# Patient Record
Sex: FEMALE | Race: WHITE | Employment: OTHER | URBAN - METROPOLITAN AREA
[De-identification: names, ages, dates, MRNs, and addresses within clinical notes are randomized per-mention and may not be internally consistent; named-entity substitution may affect disease eponyms.]

---

## 2020-09-11 ENCOUNTER — HOSPITAL ENCOUNTER (OUTPATIENT)
Age: 75
Discharge: HOME | End: 2020-09-13
Payer: MEDICARE

## 2020-09-11 ENCOUNTER — HOSPITAL ENCOUNTER (OUTPATIENT)
Dept: MAMMOGRAPHY | Age: 75
Discharge: HOME | End: 2020-09-13
Payer: MEDICARE

## 2020-09-11 PROCEDURE — 77063 BREAST TOMOSYNTHESIS BI: CPT

## 2021-09-29 ENCOUNTER — HOSPITAL ENCOUNTER (INPATIENT)
Age: 76
LOS: 8 days | Discharge: HOME | DRG: 186 | End: 2021-10-08
Attending: EMERGENCY MEDICINE | Admitting: INTERNAL MEDICINE
Payer: MEDICARE

## 2021-09-29 DIAGNOSIS — J90 PLEURAL EFFUSION, BILATERAL: ICD-10-CM

## 2021-09-29 DIAGNOSIS — R09.02 HYPOXIA: Primary | ICD-10-CM

## 2021-09-29 PROCEDURE — 96374 THER/PROPH/DIAG INJ IV PUSH: CPT

## 2021-09-29 PROCEDURE — 93005 ELECTROCARDIOGRAM TRACING: CPT | Performed by: EMERGENCY MEDICINE

## 2021-09-29 PROCEDURE — 96375 TX/PRO/DX INJ NEW DRUG ADDON: CPT

## 2021-09-29 PROCEDURE — 99285 EMERGENCY DEPT VISIT HI MDM: CPT

## 2021-09-29 RX ORDER — ASPIRIN 81 MG/1
81 TABLET, CHEWABLE ORAL DAILY
Status: ON HOLD | COMMUNITY
End: 2021-10-08 | Stop reason: HOSPADM

## 2021-09-29 RX ORDER — SERTRALINE HYDROCHLORIDE 25 MG/1
25 TABLET, FILM COATED ORAL EVERY EVENING
COMMUNITY
Start: 2021-03-31 | End: 2021-10-23

## 2021-09-29 RX ORDER — LOSARTAN POTASSIUM 50 MG/1
50 TABLET ORAL EVERY EVENING
COMMUNITY
End: 2021-10-23

## 2021-09-29 RX ORDER — PANTOPRAZOLE SODIUM 40 MG/1
40 TABLET, DELAYED RELEASE ORAL DAILY
COMMUNITY
Start: 2021-09-28 | End: 2021-12-27

## 2021-09-30 ENCOUNTER — APPOINTMENT (OUTPATIENT)
Dept: GENERAL RADIOLOGY | Age: 76
DRG: 186 | End: 2021-09-30
Payer: MEDICARE

## 2021-09-30 ENCOUNTER — APPOINTMENT (OUTPATIENT)
Dept: CT IMAGING | Age: 76
DRG: 186 | End: 2021-09-30
Payer: MEDICARE

## 2021-09-30 PROBLEM — R09.02 HYPOXIA: Status: ACTIVE | Noted: 2021-09-30

## 2021-09-30 PROBLEM — J90 PLEURAL EFFUSION: Status: ACTIVE | Noted: 2021-09-30

## 2021-09-30 LAB
ALBUMIN SERPL-MCNC: 3.2 G/DL (ref 3.5–5.2)
ALP BLD-CCNC: 131 U/L (ref 35–104)
ALT SERPL-CCNC: 38 U/L (ref 0–32)
ANION GAP SERPL CALCULATED.3IONS-SCNC: 11 MMOL/L (ref 7–16)
AST SERPL-CCNC: 20 U/L (ref 0–31)
B.E.: 4.6 MMOL/L (ref -3–3)
BASOPHILS ABSOLUTE: 0.03 E9/L (ref 0–0.2)
BASOPHILS RELATIVE PERCENT: 0.4 % (ref 0–2)
BILIRUB SERPL-MCNC: 0.4 MG/DL (ref 0–1.2)
BILIRUBIN URINE: NEGATIVE
BLOOD, URINE: NEGATIVE
BUN BLDV-MCNC: 25 MG/DL (ref 6–23)
CALCIUM SERPL-MCNC: 9.2 MG/DL (ref 8.6–10.2)
CHLORIDE BLD-SCNC: 95 MMOL/L (ref 98–107)
CLARITY: CLEAR
CO2: 29 MMOL/L (ref 22–29)
COHB: 0.5 % (ref 0–1.5)
COLOR: YELLOW
CREAT SERPL-MCNC: 0.7 MG/DL (ref 0.5–1)
CRITICAL: ABNORMAL
DATE ANALYZED: ABNORMAL
DATE OF COLLECTION: ABNORMAL
EKG ATRIAL RATE: 107 BPM
EKG P AXIS: 64 DEGREES
EKG P-R INTERVAL: 126 MS
EKG Q-T INTERVAL: 368 MS
EKG QRS DURATION: 70 MS
EKG QTC CALCULATION (BAZETT): 491 MS
EKG R AXIS: -29 DEGREES
EKG T AXIS: 86 DEGREES
EKG VENTRICULAR RATE: 107 BPM
EOSINOPHILS ABSOLUTE: 0.01 E9/L (ref 0.05–0.5)
EOSINOPHILS RELATIVE PERCENT: 0.1 % (ref 0–6)
GFR AFRICAN AMERICAN: >60
GFR NON-AFRICAN AMERICAN: >60 ML/MIN/1.73
GLUCOSE BLD-MCNC: 188 MG/DL (ref 74–99)
GLUCOSE URINE: NEGATIVE MG/DL
HCO3: 27.9 MMOL/L (ref 22–26)
HCT VFR BLD CALC: 33.4 % (ref 34–48)
HEMOGLOBIN: 10.5 G/DL (ref 11.5–15.5)
HHB: 2.8 % (ref 0–5)
IMMATURE GRANULOCYTES #: 0.04 E9/L
IMMATURE GRANULOCYTES %: 0.5 % (ref 0–5)
KETONES, URINE: NEGATIVE MG/DL
LAB: ABNORMAL
LACTIC ACID: 1.6 MMOL/L (ref 0.5–2.2)
LEUKOCYTE ESTERASE, URINE: NEGATIVE
LYMPHOCYTES ABSOLUTE: 0.56 E9/L (ref 1.5–4)
LYMPHOCYTES RELATIVE PERCENT: 6.9 % (ref 20–42)
Lab: ABNORMAL
MCH RBC QN AUTO: 28.1 PG (ref 26–35)
MCHC RBC AUTO-ENTMCNC: 31.4 % (ref 32–34.5)
MCV RBC AUTO: 89.3 FL (ref 80–99.9)
METHB: 0.3 % (ref 0–1.5)
MODE: ABNORMAL
MONOCYTES ABSOLUTE: 0.54 E9/L (ref 0.1–0.95)
MONOCYTES RELATIVE PERCENT: 6.6 % (ref 2–12)
NEUTROPHILS ABSOLUTE: 6.95 E9/L (ref 1.8–7.3)
NEUTROPHILS RELATIVE PERCENT: 85.5 % (ref 43–80)
NITRITE, URINE: NEGATIVE
O2 CONTENT: 14 ML/DL
O2 SATURATION: 97.2 % (ref 92–98.5)
O2HB: 96.4 % (ref 94–97)
OPERATOR ID: 3342
OVALOCYTES: ABNORMAL
PATIENT TEMP: 37 C
PCO2: 36.8 MMHG (ref 35–45)
PDW BLD-RTO: 14 FL (ref 11.5–15)
PH BLOOD GAS: 7.5 (ref 7.35–7.45)
PH UA: 7.5 (ref 5–9)
PLATELET # BLD: 578 E9/L (ref 130–450)
PMV BLD AUTO: 9.8 FL (ref 7–12)
PO2: 95.8 MMHG (ref 75–100)
POIKILOCYTES: ABNORMAL
POLYCHROMASIA: ABNORMAL
POTASSIUM REFLEX MAGNESIUM: 4 MMOL/L (ref 3.5–5)
PROTEIN UA: NEGATIVE MG/DL
RBC # BLD: 3.74 E12/L (ref 3.5–5.5)
REASON FOR REJECTION: NORMAL
REJECTED TEST: NORMAL
SARS-COV-2, NAAT: NOT DETECTED
SODIUM BLD-SCNC: 135 MMOL/L (ref 132–146)
SOURCE, BLOOD GAS: ABNORMAL
SPECIFIC GRAVITY UA: 1.01 (ref 1–1.03)
THB: 10.2 G/DL (ref 11.5–16.5)
TIME ANALYZED: 117
TOTAL PROTEIN: 6 G/DL (ref 6.4–8.3)
TROPONIN, HIGH SENSITIVITY: 24 NG/L (ref 0–9)
TROPONIN, HIGH SENSITIVITY: 28 NG/L (ref 0–9)
UROBILINOGEN, URINE: 0.2 E.U./DL
WBC # BLD: 8.1 E9/L (ref 4.5–11.5)

## 2021-09-30 PROCEDURE — 83605 ASSAY OF LACTIC ACID: CPT

## 2021-09-30 PROCEDURE — 99222 1ST HOSP IP/OBS MODERATE 55: CPT | Performed by: TRANSPLANT SURGERY

## 2021-09-30 PROCEDURE — 1200000000 HC SEMI PRIVATE

## 2021-09-30 PROCEDURE — 87635 SARS-COV-2 COVID-19 AMP PRB: CPT

## 2021-09-30 PROCEDURE — 80053 COMPREHEN METABOLIC PANEL: CPT

## 2021-09-30 PROCEDURE — 71275 CT ANGIOGRAPHY CHEST: CPT

## 2021-09-30 PROCEDURE — 6360000002 HC RX W HCPCS: Performed by: NURSE PRACTITIONER

## 2021-09-30 PROCEDURE — 6360000002 HC RX W HCPCS: Performed by: EMERGENCY MEDICINE

## 2021-09-30 PROCEDURE — 2580000003 HC RX 258: Performed by: EMERGENCY MEDICINE

## 2021-09-30 PROCEDURE — 74019 RADEX ABDOMEN 2 VIEWS: CPT

## 2021-09-30 PROCEDURE — 84484 ASSAY OF TROPONIN QUANT: CPT

## 2021-09-30 PROCEDURE — 85025 COMPLETE CBC W/AUTO DIFF WBC: CPT

## 2021-09-30 PROCEDURE — 6370000000 HC RX 637 (ALT 250 FOR IP): Performed by: PHYSICIAN ASSISTANT

## 2021-09-30 PROCEDURE — 6370000000 HC RX 637 (ALT 250 FOR IP): Performed by: EMERGENCY MEDICINE

## 2021-09-30 PROCEDURE — 2580000003 HC RX 258: Performed by: PHYSICIAN ASSISTANT

## 2021-09-30 PROCEDURE — C9113 INJ PANTOPRAZOLE SODIUM, VIA: HCPCS | Performed by: EMERGENCY MEDICINE

## 2021-09-30 PROCEDURE — 6360000004 HC RX CONTRAST MEDICATION: Performed by: RADIOLOGY

## 2021-09-30 PROCEDURE — 81003 URINALYSIS AUTO W/O SCOPE: CPT

## 2021-09-30 PROCEDURE — 94664 DEMO&/EVAL PT USE INHALER: CPT

## 2021-09-30 PROCEDURE — 36415 COLL VENOUS BLD VENIPUNCTURE: CPT

## 2021-09-30 PROCEDURE — 2700000000 HC OXYGEN THERAPY PER DAY

## 2021-09-30 PROCEDURE — 94640 AIRWAY INHALATION TREATMENT: CPT

## 2021-09-30 PROCEDURE — 93010 ELECTROCARDIOGRAM REPORT: CPT | Performed by: INTERNAL MEDICINE

## 2021-09-30 PROCEDURE — 82805 BLOOD GASES W/O2 SATURATION: CPT

## 2021-09-30 PROCEDURE — 71045 X-RAY EXAM CHEST 1 VIEW: CPT

## 2021-09-30 RX ORDER — POTASSIUM CHLORIDE 7.45 MG/ML
10 INJECTION INTRAVENOUS PRN
Status: DISCONTINUED | OUTPATIENT
Start: 2021-09-30 | End: 2021-10-08 | Stop reason: HOSPADM

## 2021-09-30 RX ORDER — MULTIVIT,CALC,MINS/FOLIC ACID 200 MCG
1 TABLET ORAL DAILY
COMMUNITY
End: 2021-10-23

## 2021-09-30 RX ORDER — SODIUM CHLORIDE 9 MG/ML
10 INJECTION INTRAVENOUS DAILY
Status: DISCONTINUED | OUTPATIENT
Start: 2021-09-30 | End: 2021-09-30

## 2021-09-30 RX ORDER — PANTOPRAZOLE SODIUM 40 MG/10ML
40 INJECTION, POWDER, LYOPHILIZED, FOR SOLUTION INTRAVENOUS ONCE
Status: COMPLETED | OUTPATIENT
Start: 2021-09-30 | End: 2021-09-30

## 2021-09-30 RX ORDER — FUROSEMIDE 10 MG/ML
20 INJECTION INTRAMUSCULAR; INTRAVENOUS 2 TIMES DAILY
Status: DISCONTINUED | OUTPATIENT
Start: 2021-09-30 | End: 2021-10-01

## 2021-09-30 RX ORDER — ASPIRIN 81 MG/1
81 TABLET, CHEWABLE ORAL DAILY
Status: DISCONTINUED | OUTPATIENT
Start: 2021-09-30 | End: 2021-10-01

## 2021-09-30 RX ORDER — ACETAMINOPHEN 325 MG/1
650 TABLET ORAL EVERY 6 HOURS PRN
Status: DISCONTINUED | OUTPATIENT
Start: 2021-09-30 | End: 2021-10-08 | Stop reason: HOSPADM

## 2021-09-30 RX ORDER — SODIUM CHLORIDE 0.9 % (FLUSH) 0.9 %
10 SYRINGE (ML) INJECTION EVERY 12 HOURS SCHEDULED
Status: DISCONTINUED | OUTPATIENT
Start: 2021-09-30 | End: 2021-10-08 | Stop reason: HOSPADM

## 2021-09-30 RX ORDER — LOSARTAN POTASSIUM 50 MG/1
50 TABLET ORAL DAILY
Status: DISCONTINUED | OUTPATIENT
Start: 2021-09-30 | End: 2021-10-08 | Stop reason: HOSPADM

## 2021-09-30 RX ORDER — IPRATROPIUM BROMIDE AND ALBUTEROL SULFATE 2.5; .5 MG/3ML; MG/3ML
1 SOLUTION RESPIRATORY (INHALATION)
Status: DISCONTINUED | OUTPATIENT
Start: 2021-09-30 | End: 2021-10-07

## 2021-09-30 RX ORDER — PANTOPRAZOLE SODIUM 40 MG/1
40 TABLET, DELAYED RELEASE ORAL DAILY
Status: DISCONTINUED | OUTPATIENT
Start: 2021-09-30 | End: 2021-10-01

## 2021-09-30 RX ORDER — 0.9 % SODIUM CHLORIDE 0.9 %
1000 INTRAVENOUS SOLUTION INTRAVENOUS ONCE
Status: COMPLETED | OUTPATIENT
Start: 2021-09-30 | End: 2021-09-30

## 2021-09-30 RX ORDER — PANTOPRAZOLE SODIUM 40 MG/10ML
40 INJECTION, POWDER, LYOPHILIZED, FOR SOLUTION INTRAVENOUS DAILY
Status: DISCONTINUED | OUTPATIENT
Start: 2021-09-30 | End: 2021-09-30

## 2021-09-30 RX ORDER — SODIUM CHLORIDE 9 MG/ML
25 INJECTION, SOLUTION INTRAVENOUS PRN
Status: DISCONTINUED | OUTPATIENT
Start: 2021-09-30 | End: 2021-10-08 | Stop reason: HOSPADM

## 2021-09-30 RX ORDER — SODIUM CHLORIDE 0.9 % (FLUSH) 0.9 %
10 SYRINGE (ML) INJECTION PRN
Status: DISCONTINUED | OUTPATIENT
Start: 2021-09-30 | End: 2021-10-08 | Stop reason: HOSPADM

## 2021-09-30 RX ORDER — POTASSIUM CHLORIDE 20 MEQ/1
40 TABLET, EXTENDED RELEASE ORAL PRN
Status: DISCONTINUED | OUTPATIENT
Start: 2021-09-30 | End: 2021-10-08 | Stop reason: HOSPADM

## 2021-09-30 RX ORDER — ONDANSETRON 2 MG/ML
4 INJECTION INTRAMUSCULAR; INTRAVENOUS ONCE
Status: COMPLETED | OUTPATIENT
Start: 2021-09-30 | End: 2021-09-30

## 2021-09-30 RX ORDER — SODIUM CHLORIDE 9 MG/ML
10 INJECTION INTRAVENOUS DAILY
Status: DISCONTINUED | OUTPATIENT
Start: 2021-09-30 | End: 2021-10-08 | Stop reason: HOSPADM

## 2021-09-30 RX ORDER — ACETAMINOPHEN 650 MG/1
650 SUPPOSITORY RECTAL EVERY 6 HOURS PRN
Status: DISCONTINUED | OUTPATIENT
Start: 2021-09-30 | End: 2021-10-08 | Stop reason: HOSPADM

## 2021-09-30 RX ADMIN — IPRATROPIUM BROMIDE AND ALBUTEROL SULFATE 1 AMPULE: 2.5; .5 SOLUTION RESPIRATORY (INHALATION) at 19:52

## 2021-09-30 RX ADMIN — SODIUM CHLORIDE, PRESERVATIVE FREE 10 ML: 5 INJECTION INTRAVENOUS at 13:57

## 2021-09-30 RX ADMIN — FUROSEMIDE 20 MG: 10 INJECTION, SOLUTION INTRAVENOUS at 18:05

## 2021-09-30 RX ADMIN — PANTOPRAZOLE SODIUM 40 MG: 40 TABLET, DELAYED RELEASE ORAL at 13:57

## 2021-09-30 RX ADMIN — ONDANSETRON 4 MG: 2 INJECTION INTRAMUSCULAR; INTRAVENOUS at 00:51

## 2021-09-30 RX ADMIN — Medication 10 ML: at 20:54

## 2021-09-30 RX ADMIN — Medication 10 ML: at 14:57

## 2021-09-30 RX ADMIN — LIDOCAINE HYDROCHLORIDE: 20 SOLUTION ORAL; TOPICAL at 06:07

## 2021-09-30 RX ADMIN — IOPAMIDOL 75 ML: 755 INJECTION, SOLUTION INTRAVENOUS at 02:55

## 2021-09-30 RX ADMIN — ASPIRIN 81 MG CHEWABLE TABLET 81 MG: 81 TABLET CHEWABLE at 13:57

## 2021-09-30 RX ADMIN — IPRATROPIUM BROMIDE AND ALBUTEROL SULFATE 1 AMPULE: 2.5; .5 SOLUTION RESPIRATORY (INHALATION) at 11:08

## 2021-09-30 RX ADMIN — PANTOPRAZOLE SODIUM 40 MG: 40 INJECTION, POWDER, FOR SOLUTION INTRAVENOUS at 02:13

## 2021-09-30 RX ADMIN — SERTRALINE 25 MG: 50 TABLET, FILM COATED ORAL at 20:54

## 2021-09-30 RX ADMIN — SODIUM CHLORIDE 1000 ML: 9 INJECTION, SOLUTION INTRAVENOUS at 00:50

## 2021-09-30 ASSESSMENT — ENCOUNTER SYMPTOMS
BACK PAIN: 0
ABDOMINAL PAIN: 0
NAUSEA: 1
ALLERGIC/IMMUNOLOGIC NEGATIVE: 1
CONSTIPATION: 1
BLOOD IN STOOL: 0
ABDOMINAL DISTENTION: 1
TROUBLE SWALLOWING: 0
VOMITING: 1
SHORTNESS OF BREATH: 1
COUGH: 1

## 2021-09-30 NOTE — ED NOTES
Daughter at bedside put on call light concerned for BP that is dropping. Daughter states that she is normally in the 140's and had an issue with being hypertensive in prior admission at [facility].  MD made aware of concern     Mau Nieto RN  09/30/21 3774

## 2021-09-30 NOTE — CONSULTS
infiltrate c/f aspiration pneumonia. NGT placed. Vancomycin added to abx regimen. Corpak removed as it was coiled in stomach. NG had been clamped on and off for several days. She was eventually advanced to a full liquid diet on 9/25/2021 and tolerated pretty well. Oxygen had been weaned off prior to discharge. On the day of discharge, patient was tolerating a GI soft diet, urinating independently, ambulating appropriately, having ROBF and pain controlled on PO pain medication. She was discharged from [facility] on 9/28/2021. Over the past few days since leaving the hospital she became increasingly nauseated started having large-volume emesis and worsening shortness of breath. She presented back to the ED on 9/29/2021 for further evaluation. Her daughters are also present at bedside during exam.    Upon arrival patient was placed on oxygen 6 L nasal cannula. Oxygen level 95 to 96% at rest.  Denies fevers chills. Admits to recent nausea and emesis. Recently completed full course of Zosyn for aspiration pneumonia at [facility]. Chest x-ray with no acute process. KUB abdomen nonspecific nonobstructive bowel gas pattern. CTA chest with no evidence of PE. No mediastinal lymphadenopathy. The lungs and pleura small left pleural effusion, tiny right pleural effusion, moderate left lower lobe atelectasis, minimal right lower lobe dependent atelectasis. Ill-defined irregular opacity in the right lung apex measuring 1.1 x 1.6 x 0.6 cm. Scattered calcifications nonspecific suggestive of old granulomatous disease. 3 mm subpleural left upper lobe nodule laterally. Lab testing-high-sensitivity troponin 28> 24, sodium 135, potassium 4.0, BUN 25 creatinine 0.7, hemoglobin 10.5, WBCs 8.1, alk phos 131 ALT 38 AST 20 T bili 0.4, lactic acid 1.6, ABG on 6 L 7.49/36/95/27/4.6/97% EKG with sinus tachycardia heart rate 107. Patient lying on a cart ED no acute distress. Oxygen 6 L nasal cannula. No cough or mucus.   No fever or chills. No chest pain. Dyspnea improved with placement of oxygen. Past Medical History:   Diagnosis Date    Depression     GERD (gastroesophageal reflux disease)     Hypertension        History reviewed. No pertinent surgical history. History reviewed. No pertinent family history. Social History:   Social History     Socioeconomic History    Marital status:      Spouse name: Not on file    Number of children: Not on file    Years of education: Not on file    Highest education level: Not on file   Occupational History    Not on file   Tobacco Use    Smoking status: Never Smoker    Smokeless tobacco: Never Used   Vaping Use    Vaping Use: Never used   Substance and Sexual Activity    Alcohol use: Not Currently    Drug use: Never    Sexual activity: Not on file   Other Topics Concern    Not on file   Social History Narrative    Not on file     Social Determinants of Health     Financial Resource Strain:     Difficulty of Paying Living Expenses:    Food Insecurity:     Worried About Running Out of Food in the Last Year:     [address] in the Last Year:    Transportation Needs:     Lack of Transportation (Medical):  Lack of Transportation (Non-Medical):    Physical Activity:     Days of Exercise per Week:     Minutes of Exercise per Session:    Stress:     Feeling of Stress :    Social Connections:     Frequency of Communication with Friends and Family:     Frequency of Social Gatherings with Friends and Family:     Attends Anglican Services:     Active Member of Clubs or Organizations:     Attends Club or Organization Meetings:     Marital Status:    Intimate Partner Violence:     Fear of Current or Ex-Partner:     Emotionally Abused:     Physically Abused:     Sexually Abused:      Smoking history: The patient is a former smoker quit over 20 years ago half pack per day x20 years. ETOH:   reports previous alcohol use. Exposures:  There  is not history of TB or TB exposure. There is not asbestos or silica dust exposure. The patient reports does not have coal, foundry, quarry or [employer] exposure. Recent travel history none. There is not  history of recreational or IV drug use. There is not hot tub exposure. The patient does not have any exotic pets, turtles or exotic birds. Vaccines:      Immunization History   Administered Date(s) Administered    COVID-19, Pfizer, PF, 30mcg/0.3mL 02/01/2021, 02/26/2021        Home Meds: Not in a hospital admission. CURRENT MEDS :  Scheduled Meds:   sodium chloride (PF)  10 mL IntraVENous Daily    aspirin  81 mg Oral Daily    losartan  50 mg Oral Daily    pantoprazole  40 mg Oral Daily    sertraline  25 mg Oral Daily    sodium chloride flush  10 mL IntraVENous 2 times per day    ipratropium-albuterol  1 ampule Inhalation Q4H WA       Continuous Infusions:   sodium chloride         Allergies   Allergen Reactions    Chocolate Other (See Comments)     HEARTBURN       REVIEW OF SYSTEMS:  Constitutional: Denies fever, weight loss, night sweats, and fatigue  Skin: Denies pigmentation, dark lesions, and rashes   HEENT: Denies hearing loss, tinnitus, ear drainage, epistaxis, sore throat, and hoarseness. Cardiovascular: Denies palpitations, chest pain, and chest pressure.   Respiratory: Dyspnea  Gastrointestinal: Nausea and vomiting  Genitourinary: Denies dysuria, frequency, urgency or hematuria  Musculoskeletal: Denies myalgias, muscle weakness, and bone pain  Neurological: Denies dizziness, vertigo, headache, and focal weakness  Psychological: Denies anxiety and depression  Endocrine: Denies heat intolerance and cold intolerance  Hematopoietic/Lymphatic: Denies bleeding problems and blood transfusions    OBJECTIVE:   /63   Pulse 91   Temp 98.4 °F (36.9 °C) (Oral)   Resp 16   Ht 5' 6\" (1.676 m)   Wt 185 lb (83.9 kg)   SpO2 96%   BMI 29.86 kg/m²   SpO2 Readings from Last 1 Encounters:   09/30/21 96% right atrial pressure is 8   mmHg based on IVC assessment. - There are no significant valvular abnormalities. - The patient has not had a prior CC echocardiographic exam for comparison. [facility] CT chest without contrast 9/16/2021  1.  Bilateral low-density pleural effusions, left greater than right.     Dependent consolidative airspace opacities associated with volume loss in   both lower lobes, most likely secondary to nonspecific atelectasis. 2. Heterogeneous airspace/groundglass opacity in the right upper lobe,   likely secondary to focal pneumonia.  Additional peribronchiolar   groundglass and airspace opacities which are likely infectious in nature   are identified in the right lung. 3. Multiple vague, tiny centrilobular nodular densities are scattered in   both upper lobes, most commonly secondary to nonspecific inflammatory   bronchiolitis, possibly infectious in nature. 4. No region of intrathoracic lymphadenopathy is identified. Ultrasound legs 9/16/2021       Non-Invasive Vascular Laboratory   [facility] Port[facility]       Lower Extremity Venous Duplex   Bilateral/Complete     Date of service/time: 9/16/2021 8:59:06 AM   MRN: [id number]   Accession #: [id number]   ----------   Technically difficult exam due to patient positioning and edema. RIGHT SIDE - DEEP VEINS   Negative for acute deep vein thrombosis. Only segments visualized of the peroneal veins. LEFT SIDE - DEEP VEINS   Negative for acute deep vein thrombosis. Only segments visualized of the peroneal veins.           Labs:  Lab Results   Component Value Date    WBC 8.1 09/30/2021    HGB 10.5 09/30/2021    HCT 33.4 09/30/2021    MCV 89.3 09/30/2021    MCH 28.1 09/30/2021    MCHC 31.4 09/30/2021    RDW 14.0 09/30/2021     09/30/2021    MPV 9.8 09/30/2021     Lab Results   Component Value Date     09/30/2021    K 4.0 09/30/2021    CL 95 09/30/2021    CO2 29 09/30/2021    BUN 25 09/30/2021    CREATININE 0.7 09/30/2021    LABALBU 3.2 09/30/2021    CALCIUM 9.2 09/30/2021    GFRAA >60 09/30/2021    LABGLOM >60 09/30/2021     No results found for: PROTIME, INR  No results for input(s): PROBNP in the last 72 hours. No results for input(s): TROPONINI in the last 72 hours. No results for input(s): PROCAL in the last 72 hours. This SmartLink has not been configured with any valid records. Micro:  No results for input(s): CULTRESP in the last 72 hours. No results for input(s): LABGRAM in the last 72 hours. No results for input(s): LEGUR in the last 72 hours. No results for input(s): STREPNEUMAGU in the last 72 hours. No results for input(s): LP1UAG in the last 72 hours. Surgical pathology 9/9/2021  Specimen originated from [facility] [place][note 2]     Specimen #: [id number]   Submitting Physician: SALLY Potts.Anes)       __________________________________________________________________________     FINAL DIAGNOSIS     1. Gallbladder, cholecystectomy (A) - Gallbladder with focal chronic   inflammation. 2. Pancreas, main duct, margin, excision (B) - Cross section of pancreas   with pancreatic intraepithelial neoplasia (PAN-IN1). 3. Duodenum, common bile duct and head of pancreas, Pylorus-Preserving   Pancreaticoduodenectomy(C)   - Intraductal papillary mucinous neoplasm, side branch. - Twelve regional lymph nodes with no significant diagnostic alterations. 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        Specimen Request  Specimen collected in surgery. Specimen received in sterile container. Smear Result  No organisms seen       Smear Result  No Polymorphonuclear Leukocytes       Culture  No growth 3 days          Assessment:  1. Acute hypoxic respiratory failure  2. Small pleural effusion with compressive atelectasis left lower lobe  3. Small area of scar and right lung apex  4.  Recent aspiration pneumonia-completed antibiotics Zosyn  5. 9/9/2021 Whipple procedure at [facility] Dr. Susan Ghosh  6. Pancreatic cyst pathology negative for malignant cells  7. Nausea and vomiting likely related to delayed gastric emptying-recent NG tube placement post Whipple procedure  8. GERD  9. Hypertension  10. 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 Constipation    Plan:  1. Oxygen therapy 6 L nasal cannula-wean to keep greater than 92%  2. CT chest imaging reviewed by Dr. Elizabeth Morrison. Left effusion appears very small with compressive atelectasis of lung tissue likely contributing to patient's hypoxia and shortness of breath. 3. We will add lung recruitment maneuvers-incentive spirometer, EZ Pap with scheduled bronchodilators every 4 hours  4. Follow chest x-ray in a.m.-orders placed. If no improvement will perform ultrasound of chest to assess degree of pleural fluid. 5. Will consult Dr. Usama Ruff for further recommendations regarding nausea and vomiting, status post Whipple procedure 9/9  6. Full liquid diet  7. Replace electrolytes. Antiemetics. 8. GI prophylaxis and Protonix. Consider adding bowel regimen, prokinetic    Thank you for allowing me to participate in the care of Nitesh Lines. Please feel free to call with questions. This plan of care was reviewed in collaboration with Dr. Elizabeth Morrison    Electronically signed by NAMRATA Javier CNP on 9/30/2021 at 3:10 PM      Note: This report was completed utilizing computer voice recognition software. Every effort has been made to ensure accuracy, however; inadvertent computerized transcription errors may be present    I personally saw, examined, and cared for the patient. Labs, medications, radiographs reviewed. I agree with history exam and plans detailed in NP note with the following additions:    Ms. Dereje Morgan is a 67year old female with recent Whipple procedure 3 weeks ago complicated by pneumonia and delayed gastric emptying.   She presented to the ED with increased nausea, vomiting, shortness of breath. CT chest with a small L effusion and atelectasis. She has no pneumonia. I feel this is likely too small to tap but will perform a bedside US. In the meantime start incentive and EZPAP. Hepatobiliary has been consulted for her vomiting and delayed gastric emptying.     Electronically signed by Kuldeep Cruz MD on 9/30/2021 at 11:53 PM

## 2021-09-30 NOTE — PROGRESS NOTES
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 Had gastric pneumatosis and was started on TPN then transitioned to tube feedings. Tube feeds were stopped and patient was discharged on 9/28 tolerating a full liquid diet. She still has delayed gastric emptying on imaging and based on symptoms. Recommend transfer back to [facility] to Dr. Chucho Beavers service her operating surgeon to address her post operative issues and determine if a feeding tube needs replaced. She is only 3 weeks post surgery.     Electronically signed by Leigh Ann Soto MD on 9/30/2021 at 3:14 PM

## 2021-09-30 NOTE — ED NOTES
Assume care of pt at this time, VSS and pt free of any immediate distress. Daughter at bedside.  Call light in reach will continue to monitor      Saira Alonzo RN  09/30/21 5925

## 2021-09-30 NOTE — CONSULTS
Hepatobiliary and Pancreatic Surgery    History and Physical      Patient's Name/Date of Birth: Juan Ramon Morton /1945 (68 y.o.)    Date: September 30, 2021     CC:   Chief Complaint   Patient presents with    Shortness of Breath    Emesis       HPI:  Juan Ramon Morton is a 67year old female presenting with shortness of breath and emesis who recently underwent a Whipple procedure at the [facility] on 9/9 for branch duct IPMN. Surgical course at [facility] was as follows:    9/10: Started on trickle tube feeds and clear liquids. RAMSES drains maintained to bulb suction. Pain controlled with a dilaudid PCA along with PO tylenol. Lovenox ordered for DVT PPX. PT skilled for Home PT. Case management consulted for dispo planning.   9/11: Urinary retention overnight requiring straight catheterization. Continued on trickle tube feeds and clears. 9/12: Tube feeds discontinued. Remained on full liquids. L RAMSES bumped at bedside. 9/13-9/14: Passing small amount of flatus. Diet advanced to soft solids. PCA discontinued. 9/15: Continued respiratory insufficiency requiring supplemental O2. Continued on pulmonary toilet. Given 20mg IV lasix. RT consulted. Did not have appropriate UOP after diuresis. Became hypotensive and hypoxic in the evening with marginal responses to IVF boluses. Having AMS. Transferred to SICU for further management. Started on empiric zosyn. 9/16: CT abdomen/pelvis demonstrating pneumatosis in the posterior stomach. CT chest demonstrating BL pleural effusions along with RUL focal infiltrate c/f aspiration pneumonia. NGT placed. Vancomycin added to abx regimen. Corpak removed as it was coiled in stomach. 9/17: NST consulted for initiation of TPN. PT skilled for SNF.  9/18: Continued SICU care. Insulin increased in TPN.   9/19: Continued diuresis with IV lasix. Home losartan resumed. No BM yet. NGT output decreasing   9/20: Transferred to the [facility]  9/21: Began having BM.  NGT maintained to LCS. Continued on TPN. Geriatrics consulted for delirium   9/22: Tolerating serial NGT clamp trials. Finished course of IV abx.   9/23: Fiorcet added for worsening HA. Ambulating well with PT. Skilled for SNF  9/24: Continued on serial NGT clamp trials. NGT removed in the evening  9/25: Given full liquid diet as tolerated. 9/26: Tolerating full liquids. Continues to have BM  9/27: Diet advanced to soft solids. PT skilled for Home PT. Case management reconsulted for dispo planning. TPN discontinued. She reports that her nausea/emesis coincided with the removal of her NG tube and start of regular diet. She reports that she has only had 3 bowel movements since her surgery with her last being Monday. CTA pulmonary revealed bilateral pleural effusions. She is on 6 L of O2. She is currently resting in her bed comfortably, in no pain, and tolerating a full liquid diet with no nausea or vomiting. Past Medical History:   Diagnosis Date    Depression     GERD (gastroesophageal reflux disease)     Hypertension        History reviewed. No pertinent surgical history.     Current Facility-Administered Medications   Medication Dose Route Frequency Provider Last Rate Last Admin    sodium chloride (PF) 0.9 % injection 10 mL  10 mL IntraVENous Daily [place] Motto, DO   10 mL at 09/30/21 1357    aspirin chewable tablet 81 mg  81 mg Oral Daily GUERRERO Fortune   81 mg at 09/30/21 1357    losartan (COZAAR) tablet 50 mg  50 mg Oral Daily Beatrice Conte, [address]        pantoprazole (PROTONIX) tablet 40 mg  40 mg Oral Daily GUERRERO Fortune   40 mg at 09/30/21 1357    sertraline (ZOLOFT) tablet 25 mg  25 mg Oral Daily Beatrice Conte, [address]        sodium chloride flush 0.9 % injection 10 mL  10 mL IntraVENous 2 times per day GUERRERO Fortune   10 mL at 09/30/21 1457    sodium chloride flush 0.9 % injection 10 mL  10 mL IntraVENous PRN GUERRERO Fortune        0.9 % sodium chloride infusion  25 mL IntraVENous PRN GUERRERO Abel        potassium chloride (KLOR-CON M) extended release tablet 40 mEq  40 mEq Oral PRN GUERRERO Abel        Or    potassium bicarb-citric acid (EFFER-K) effervescent tablet 40 mEq  40 mEq Oral PRN GUERRERO Abel        Or    potassium chloride 10 mEq/100 mL IVPB (Peripheral Line)  10 mEq IntraVENous PRN GUERRERO Abel        magnesium hydroxide (MILK OF MAGNESIA) 400 MG/5ML suspension 30 mL  30 mL Oral Daily PRN GUERRERO Abel        acetaminophen (TYLENOL) tablet 650 mg  650 mg Oral Q6H PRN GUERRERO Abel        Or    acetaminophen (TYLENOL) suppository 650 mg  650 mg Rectal Q6H PRN GUERRERO Abel        trimethobenzamide (TIGAN) injection 200 mg  200 mg IntraMUSCular Q6H PRN GUERRERO Abel        ipratropium-albuterol (DUONEB) nebulizer solution 1 ampule  1 ampule Inhalation Q4H WA Colby Abel   1 ampule at 09/30/21 1108       Allergies   Allergen Reactions    Chocolate Other (See Comments)     HEARTBURN       History reviewed. No pertinent family history. Social History     Socioeconomic History    Marital status:      Spouse name: Not on file    Number of children: Not on file    Years of education: Not on file    Highest education level: Not on file   Occupational History    Not on file   Tobacco Use    Smoking status: Never Smoker    Smokeless tobacco: Never Used   Vaping Use    Vaping Use: Never used   Substance and Sexual Activity    Alcohol use: Not Currently    Drug use: Never    Sexual activity: Not on file   Other Topics Concern    Not on file   Social History Narrative    Not on file     Social Determinants of Health     Financial Resource Strain:     Difficulty of Paying Living Expenses:    Food Insecurity:     Worried About Running Out of Food in the Last Year:     [address] in the Last Year:    Transportation Needs:     Lack of Transportation (Medical):      Lack of Transportation (Non-Medical): Physical Activity:     Days of Exercise per Week:     Minutes of Exercise per Session:    Stress:     Feeling of Stress :    Social Connections:     Frequency of Communication with Friends and Family:     Frequency of Social Gatherings with Friends and Family:     Attends Gnosticism Services:     Active Member of Clubs or Organizations:     Attends Club or Organization Meetings:     Marital Status:    Intimate Partner Violence:     Fear of Current or Ex-Partner:     Emotionally Abused:     Physically Abused:     Sexually Abused:        ROS:   Review of Systems   Constitutional: Positive for appetite change. Negative for chills and fever. HENT: Negative for congestion and trouble swallowing. Eyes: Negative for visual disturbance. Respiratory: Positive for cough and shortness of breath. Cardiovascular: Positive for chest pain. Gastrointestinal: Positive for abdominal distention, constipation, nausea and vomiting. Negative for abdominal pain and blood in stool. Genitourinary: Negative for difficulty urinating. Musculoskeletal: Negative for arthralgias and back pain. Skin: Negative for rash. Allergic/Immunologic: Negative. Neurological: Negative for headaches. Hematological: Does not bruise/bleed easily. Psychiatric/Behavioral: Negative. Physical Exam:  /62   Pulse 84   Temp 98.4 °F (36.9 °C) (Oral)   Resp 16   Ht 5' 6\" (1.676 m)   Wt 180 lb (81.6 kg)   SpO2 98%   BMI 29.05 kg/m²     PSYCH: mood and affect normal, alert and oriented x 3: No apparent distress, comfortable  EYES: Sclera white, pupils equal round and reactive to light  ENMT:  Hearing normal, trachea midline, ears externally intact  RESP: Increased work of breathing on 6 L of O2  CV: Regular rate, normotensive. GI/ Abdomen: The abdomen was soft and mildly distended. Incisions C/D/I. There was no tenderness, guarding, rebound, or rigidity. There was no, hepatosplenomegaly, or hernias.   MSK: no clubbing/ no cyanosis       Assessment/Plan:   This is a 67year old female s/p Wh[facility] procedure 9[facility]9/21 [facility] for IPMN with now delayed gastric emptying grade C, malnutrition  - NPO with sips and ice chips, etc  - NGT to LIS  - Xray for NGT placement  - Will need upper GI with SBFT on Monday after 48 hours of decompression  - start TPN  - protonix bid  - feels better with thoracocentesis, however most of her symptoms are secondary to delayed gastric emptying      Electronically signed by Carolina Wilder MD on 10/1/2021 at 4:47 PM

## 2021-09-30 NOTE — ED PROVIDER NOTES
HPI:  9/30/21,   Time: 7:19 AM EDT        Leonor Liang is a 67 y.o. female presenting to the ED for nausea vomiting shortness of breath. Patient states she recently had a Whipple procedure done at [facility] which was complicated by pneumonia. Patient was discharged a couple days ago. Patient states shortly after discharge she began having nausea and vomiting. Patient states yesterday she began having shortness of breath. Denies fevers or chills, congestion sore throat, abdominal pain, chest pain, or any other complaints. ROS:   GEN: no fevers, no chills, no fatique  HENT: No trauma, no sore throat, no swelling throat or oral mucosa  EYES: No changes in vision, no pain  CARDIO: No chest pain, no palpitations  PULM: See HPI  ABD: See HPI  MSK: No pain, no trauma, no deformities  SKIN: No rashes, no abrasions, no lacerations  NEURO: No changes in mentation, no headache, no weakness     --------------------------------------------- PAST HISTORY ---------------------------------------------  Past Medical History:  has a past medical history of Depression. Past Surgical History:  has no past surgical history on file. Social History:  reports that she has never smoked. She has never used smokeless tobacco. She reports previous alcohol use. She reports that she does not use drugs. Family History: family history is not on file. The patients home medications have been reviewed. Allergies: Patient has no known allergies.     -------------------------------------------------- RESULTS -------------------------------------------------  All laboratory and radiology results have been personally reviewed by myself   LABS:  Results for orders placed or performed during the hospital encounter of 09/29/21   COVID-19, Rapid    Specimen: Nasopharyngeal Swab   Result Value Ref Range    SARS-CoV-2, NAAT Not Detected Not Detected   CBC Auto Differential   Result Value Ref Range    WBC 8.1 4.5 - 11.5 E9/L RBC 3.74 3.50 - 5.50 E12/L    Hemoglobin 10.5 (L) 11.5 - 15.5 g/dL    Hematocrit 33.4 (L) 34.0 - 48.0 %    MCV 89.3 80.0 - 99.9 fL    MCH 28.1 26.0 - 35.0 pg    MCHC 31.4 (L) 32.0 - 34.5 %    RDW 14.0 11.5 - 15.0 fL    Platelets 719 (H) 454 - 450 E9/L    MPV 9.8 7.0 - 12.0 fL    Neutrophils % 85.5 (H) 43.0 - 80.0 %    Immature Granulocytes % 0.5 0.0 - 5.0 %    Lymphocytes % 6.9 (L) 20.0 - 42.0 %    Monocytes % 6.6 2.0 - 12.0 %    Eosinophils % 0.1 0.0 - 6.0 %    Basophils % 0.4 0.0 - 2.0 %    Neutrophils Absolute 6.95 1.80 - 7.30 E9/L    Immature Granulocytes # 0.04 E9/L    Lymphocytes Absolute 0.56 (L) 1.50 - 4.00 E9/L    Monocytes Absolute 0.54 0.10 - 0.95 E9/L    Eosinophils Absolute 0.01 (L) 0.05 - 0.50 E9/L    Basophils Absolute 0.03 0.00 - 0.20 E9/L    Polychromasia 1+     Poikilocytes 1+     Ovalocytes 1+    Comprehensive Metabolic Panel w/ Reflex to MG   Result Value Ref Range    Sodium 135 132 - 146 mmol/L    Potassium reflex Magnesium 4.0 3.5 - 5.0 mmol/L    Chloride 95 (L) 98 - 107 mmol/L    CO2 29 22 - 29 mmol/L    Anion Gap 11 7 - 16 mmol/L    Glucose 188 (H) 74 - 99 mg/dL    BUN 25 (H) 6 - 23 mg/dL    CREATININE 0.7 0.5 - 1.0 mg/dL    GFR Non-African American >60 >=60 mL/min/1.73    GFR African American >60     Calcium 9.2 8.6 - 10.2 mg/dL    Total Protein 6.0 (L) 6.4 - 8.3 g/dL    Albumin 3.2 (L) 3.5 - 5.2 g/dL    Total Bilirubin 0.4 0.0 - 1.2 mg/dL    Alkaline Phosphatase 131 (H) 35 - 104 U/L    ALT 38 (H) 0 - 32 U/L    AST 20 0 - 31 U/L   Troponin   Result Value Ref Range    Troponin, High Sensitivity 28 (H) 0 - 9 ng/L   Urinalysis, reflex to microscopic   Result Value Ref Range    Color, UA Yellow Straw/Yellow    Clarity, UA Clear Clear    Glucose, Ur Negative Negative mg/dL    Bilirubin Urine Negative Negative    Ketones, Urine Negative Negative mg/dL    Specific Gravity, UA 1.015 1.005 - 1.030    Blood, Urine Negative Negative    pH, UA 7.5 5.0 - 9.0    Protein, UA Negative Negative mg/dL Urobilinogen, Urine 0.2 <2.0 E.U./dL    Nitrite, Urine Negative Negative    Leukocyte Esterase, Urine Negative Negative   Lactic Acid, Plasma   Result Value Ref Range    Lactic Acid 1.6 0.5 - 2.2 mmol/L   Blood Gas, Arterial   Result Value Ref Range    Date Analyzed [id number]     Time Analyzed 0117     Source: Blood Arterial     pH, Blood Gas 7.498 (H) 7.350 - 7.450    PCO2 36.8 35.0 - 45.0 mmHg    PO2 95.8 75.0 - 100.0 mmHg    HCO3 27.9 (H) 22.0 - 26.0 mmol/L    B.E. 4.6 (H) -3.0 - 3.0 mmol/L    O2 Sat 97.2 92.0 - 98.5 %    O2Hb 96.4 94.0 - 97.0 %    COHb 0.5 0.0 - 1.5 %    MetHb 0.3 0.0 - 1.5 %    O2 Content 14.0 mL/dL    HHb 2.8 0.0 - 5.0 %    tHb (est) 10.2 (L) 11.5 - 16.5 g/dL    Mode NC- 6L     Date Of Collection      Time Collected      Pt Temp 37.0 C     ID [id number]     Lab [id number]     Critical(s) Notified . No Critical Values    SPECIMEN REJECTION   Result Value Ref Range    Rejected Test trp5     Reason for Rejection see below    Troponin   Result Value Ref Range    Troponin, High Sensitivity 24 (H) 0 - 9 ng/L       RADIOLOGY:  Interpreted by Radiologist.  CTA PULMONARY W CONTRAST   Final Result   No identified pulmonary embolism. Left greater than right pleural effusions with left greater than right lower   lobe atelectasis. Irregular opacity in the right lung apex may represent an area of scarring or   fibrosis. Neoplastic process is difficult to exclude. If no previous study   is available for comparison, further characterization with PET scan would be   recommended. Alternatively, follow-up CT in 3 months if PET scan cannot be   obtained. Tiny areas of subpleural nodularity bilaterally are favored to be incidental   but could also be followed on subsequent exams.          XR ABDOMEN (2 VIEWS)    (Results Pending)       ------------------------- NURSING NOTES AND VITALS REVIEWED ---------------------------   The nursing notes within the ED encounter and vital signs as below have been reviewed. BP (!) 96/46   Pulse 96   Temp 98.6 °F (37 °C) (Oral)   Resp 16   Ht 5' 6\" (1.676 m)   Wt 185 lb (83.9 kg)   SpO2 94%   BMI 29.86 kg/m²   Oxygen Saturation Interpretation: Normal    ---------------------------------------------------PHYSICAL EXAM--------------------------------------    GEN: + Moderate distress, ill-appearing, well nourished   HENT: Normocephalic, atraumatic, oral mucosa moist  EYES: No scleral injection, no scleral icterus, PERRL   PULM: Lungs clear to ascultation bilaterally, no wheezes, no crackles  CARDIO: Regular rate, regular rhythm, normal S1/S2  ABD: Soft, non-tender, no rigidity, no guarding, no distention.  + Large well-healing vertical midline scar without dehiscence, no surrounding erythema, no purulence or drainage, no evidence for infectious process. MSK: No deformities, no edema, palpable pulses all extremities   SKIN: No rashes, no lacerations, no abrasions. Surgical wound as noted above  NEURO: Awake, alert, appropriate         ------------------------------ ED COURSE/MEDICAL DECISION MAKING----------------------  Medications   pantoprazole (PROTONIX) injection 40 mg (40 mg IntraVENous Given 9/30/21 0213)     And   sodium chloride (PF) 0.9 % injection 10 mL (has no administration in time range)   0.9 % sodium chloride bolus (0 mLs IntraVENous Stopped 9/30/21 0159)   ondansetron (ZOFRAN) injection 4 mg (4 mg IntraVENous Given 9/30/21 0051)   iopamidol (ISOVUE-370) 76 % injection 75 mL (75 mLs IntraVENous Given 9/30/21 0255)   aluminum & magnesium hydroxide-simethicone (MAALOX) 30 mL, lidocaine viscous hcl (XYLOCAINE) 5 mL (GI COCKTAIL) ( Oral Given 9/30/21 0607)         ED Course and Medical Decision Making:   Patient presents with nausea vomiting and shortness of breath. Patient noted to be hypoxic 84% on room air. Patient is not normally oxygen dependent. No known history of lung disease. Covid negative.   Imaging revealed bilateral pleural effusions worse on the left. Patient afebrile, no leukocytosis, doubt infectious process. Abdomen soft nontender and surgical wound healing well. Patient seen and reexamined prior to disposition, patient states she feels much better on oxygen, states nausea is much improved after treatment. Hospital admission recommended for ongoing evaluation and management. Patient states understanding and agrees to plan. Daughter at bedside. Spoke with admitting team, case discussed, accepted for admission.       --------------------------------- ADDITIONAL PROVIDER NOTES ---------------------------------        EKG Interpretation  Interpreted by emergency department physician    Rhythm: normal sinus   Rate: 107  Axis: left  Conduction: normal  ST Segments: normal  T Waves: normal    Clinical Impression: no acute changes        This patient's ED course included: re-evaluation prior to disposition and a personal history and physicial eaxmination    This patient has remained hemodynamically stable and improved during their ED course. Counseling: The emergency provider has spoken with the patient and daughter and discussed todays results, in addition to providing specific details for the plan of care and counseling regarding the diagnosis and prognosis. Questions are answered at this time and they are agreeable with the plan.      --------------------------------- IMPRESSION AND DISPOSITION ---------------------------------    IMPRESSION  1. Hypoxia    2.  Pleural effusion, bilateral        DISPOSITION  Disposition: Admit to med/surg floor  Patient condition is fair        Alo Whyte DO  09/30/21 0732

## 2021-09-30 NOTE — H&P
[address] [facility]  History and Physical      CHIEF COMPLAINT:    Chief Complaint   Patient presents with    Shortness of Breath    Emesis        Patient of Tony Byrd MD presents with:  Hypoxia    History of Present Illness:   Patient is a 67year old female with a PMH of depression, GERD and hypertension who presents to the emergency room for SOB and nausea and vomiting. Patient was recently discharged on 9/28 from the [address] [place] [address] for a Whipple procedure on 9/9. Patient states she was up going to the restroom when she got extremely short of breath and then started to projectile vomit. Her daughter had a pulse oximetry at the house and after dropping down to 85% on room air and watching her mother struggle to catch her breath she decided to bring her mother in to be examined. Patient had a CTA Pulmonary completed which revealed left and right pleural effusions with the left larger than the right as well as bilateral lower lobe atelectasis. Patient is currently on 6L of oxygen and does not wear any at home. Patient denies CP, abdominal pain, fevers or chills. On assessment patient states that she does not have nausea and even when she vomits, there is no warning. Patient to be admitted to a medical surgical unit for further workup and treatment. REVIEW OF SYSTEMS:  Pertinent negatives are above in HPI. 10 point ROS otherwise negative. Past Medical History:   Diagnosis Date    Depression     GERD (gastroesophageal reflux disease)     Hypertension          History reviewed. No pertinent surgical history. Medications Prior to Admission:    Not in a hospital admission. Note that the patient's home medications were reviewed and the above list is accurate to the best of my knowledge at the time of the exam.    Allergies:    Chocolate    Social History:    reports that she has never smoked. She has never used smokeless tobacco. She reports previous alcohol use.  She reports that she does not use drugs. Family History:   family history is not on file. PHYSICAL EXAM:    Vitals:  /63   Pulse 91   Temp 98.4 °F (36.9 °C) (Oral)   Resp 16   Ht 5' 6\" (1.676 m)   Wt 185 lb (83.9 kg)   SpO2 96%   BMI 29.86 kg/m²       General appearance: NAD, conversant  Eyes: Sclerae anicteric, PERRLA  HEENT: AT/NC, MMM  Neck: FROM, supple, no thyromegaly  Lymph: No cervical / supraclavicular lymphadenopathy  Lungs: Clear to auscultation, WOB normal  CV: RRR, no MRGs, no lower extremity edema  Abdomen: Soft, non-tender; no masses or HSM, hypoactive bowel sounds  Extremities: FROM without synovitis. No clubbing or cyanosis of the hands. Skin: no rash, induration, lesions, or ulcers  Psych: Calm and cooperative. Normal judgement and insight. Normal mood and affect. Neuro: Alert and interactive, face symmetric, speech fluent. LABS:  All labs reviewed. Of note:  CBC with Differential:    Lab Results   Component Value Date    WBC 8.1 09/30/2021    RBC 3.74 09/30/2021    HGB 10.5 09/30/2021    HCT 33.4 09/30/2021     09/30/2021    MCV 89.3 09/30/2021    MCH 28.1 09/30/2021    MCHC 31.4 09/30/2021    RDW 14.0 09/30/2021    LYMPHOPCT 6.9 09/30/2021    MONOPCT 6.6 09/30/2021    BASOPCT 0.4 09/30/2021    MONOSABS 0.54 09/30/2021    LYMPHSABS 0.56 09/30/2021    EOSABS 0.01 09/30/2021    BASOSABS 0.03 09/30/2021     CMP:    Lab Results   Component Value Date     09/30/2021    K 4.0 09/30/2021    CL 95 09/30/2021    CO2 29 09/30/2021    BUN 25 09/30/2021    CREATININE 0.7 09/30/2021    GFRAA >60 09/30/2021    LABGLOM >60 09/30/2021    GLUCOSE 188 09/30/2021    PROT 6.0 09/30/2021    LABALBU 3.2 09/30/2021    CALCIUM 9.2 09/30/2021    BILITOT 0.4 09/30/2021    ALKPHOS 131 09/30/2021    AST 20 09/30/2021    ALT 38 09/30/2021       Imaging:  CTA Pulmonary: Left and right pleural effusions with the left larger than the right as well as bilateral lower lobe atelectasis. EKG:  Sinus Tachycardia    Telemetry:  SR    ASSESSMENT/PLAN:  Principal Problem:    Hypoxia  Active Problems:    Pleural effusion  Resolved Problems:    * No resolved hospital problems. *      Patient is a 67year old female with a PMH of depression, GERD and hypertension who presents to the emergency room for SOB and nausea and vomiting. Post-op Pneumonia  - CTA Pulm: Left and right pleural effusions with the left larger than the right as well as bilateral lower lobe atelectasis. -Monitor labs - WBC  -procal, sed rate, CRP, blood cultures pending  -Doron  -pulmonology consulted  -Daily weights  -I/os  -Incentive spirometry   -6L NC o2 - not on any at home. -Monitor O2 saturations and supplement oxygen to keep saturations greater than 93%, wean as necessary    DVT Prophylaxis   PT/OT  Discharge planning    Code status: Full  Requires Inpatient level of care  NAMRATA Garner CNP    2:54 PM  9/30/2021     Discussed at length with patient and daughter regarding possible transfer to [facility] clinic. They are  adamant about not wanting to be transferred to [facility]. We discussed that if this becomes a surgical issue or if she has ongoing bouts of nausea and vomiting, we will have to initiate transfer. At this point she feels 100% better than when she came in and would like to stay at [facility] for the time being. Breathing is much improved on 2 L of oxygen  She has no acute complaints at all-denies abdominal pain, nausea, vomiting currently  Will advance diet very slowly starting only with clears at this point    Above note edited to reflect my thoughts     I personally saw, examined and provided care for the patient. Radiographs, labs and medication list were reviewed by me independently. The case was discussed in detail and plans for care were established. Review of GITA Garner   , documentation was conducted and revisions were made as appropriate directly by me.  I agree with the

## 2021-10-01 ENCOUNTER — APPOINTMENT (OUTPATIENT)
Dept: ULTRASOUND IMAGING | Age: 76
DRG: 186 | End: 2021-10-01
Payer: MEDICARE

## 2021-10-01 ENCOUNTER — APPOINTMENT (OUTPATIENT)
Dept: GENERAL RADIOLOGY | Age: 76
DRG: 186 | End: 2021-10-01
Payer: MEDICARE

## 2021-10-01 LAB
AMYLASE FLUID: 18 U/L
ANION GAP SERPL CALCULATED.3IONS-SCNC: 8 MMOL/L (ref 7–16)
APPEARANCE FLUID: NORMAL
B.E.: 3.7 MMOL/L (ref -3–3)
BASOPHILS ABSOLUTE: 0.03 E9/L (ref 0–0.2)
BASOPHILS RELATIVE PERCENT: 0.5 % (ref 0–2)
BUN BLDV-MCNC: 25 MG/DL (ref 6–23)
CALCIUM SERPL-MCNC: 8.3 MG/DL (ref 8.6–10.2)
CELL COUNT FLUID TYPE: NORMAL
CHLORIDE BLD-SCNC: 97 MMOL/L (ref 98–107)
CHOLESTEROL FLUID: 49 MG/DL
CO2: 30 MMOL/L (ref 22–29)
COHB: 0.4 % (ref 0–1.5)
COLOR FLUID: YELLOW
CREAT SERPL-MCNC: 0.6 MG/DL (ref 0.5–1)
CRITICAL: ABNORMAL
CRITICAL: ABNORMAL
D DIMER: 4356 NG/ML DDU
DATE ANALYZED: ABNORMAL
DATE ANALYZED: ABNORMAL
DATE OF COLLECTION: ABNORMAL
DATE OF COLLECTION: ABNORMAL
EOSINOPHILS ABSOLUTE: 0.07 E9/L (ref 0.05–0.5)
EOSINOPHILS RELATIVE PERCENT: 1.2 % (ref 0–6)
FLUID TYPE: NORMAL
GFR AFRICAN AMERICAN: >60
GFR NON-AFRICAN AMERICAN: >60 ML/MIN/1.73
GLUCOSE BLD-MCNC: 150 MG/DL (ref 74–99)
GLUCOSE, FLUID: 180 MG/DL
HCO3: 26.1 MMOL/L (ref 22–26)
HCT VFR BLD CALC: 29.5 % (ref 34–48)
HEMOGLOBIN: 9.1 G/DL (ref 11.5–15.5)
HHB: 3.2 % (ref 0–5)
IMMATURE GRANULOCYTES #: 0.02 E9/L
IMMATURE GRANULOCYTES %: 0.4 % (ref 0–5)
LAB: ABNORMAL
LAB: ABNORMAL
LD, FLUID: 120 U/L
LYMPHOCYTES ABSOLUTE: 0.96 E9/L (ref 1.5–4)
LYMPHOCYTES RELATIVE PERCENT: 16.8 % (ref 20–42)
Lab: ABNORMAL
MCH RBC QN AUTO: 28.3 PG (ref 26–35)
MCHC RBC AUTO-ENTMCNC: 30.8 % (ref 32–34.5)
MCV RBC AUTO: 91.9 FL (ref 80–99.9)
METER GLUCOSE: 154 MG/DL (ref 74–99)
METHB: 0.3 % (ref 0–1.5)
MODE: ABNORMAL
MONOCYTE, FLUID: 90 %
MONOCYTES ABSOLUTE: 0.48 E9/L (ref 0.1–0.95)
MONOCYTES RELATIVE PERCENT: 8.4 % (ref 2–12)
NEUTROPHIL, FLUID: 11 %
NEUTROPHILS ABSOLUTE: 4.14 E9/L (ref 1.8–7.3)
NEUTROPHILS RELATIVE PERCENT: 72.7 % (ref 43–80)
NUCLEATED CELLS FLUID: 785 /UL
O2 CONTENT: 13.7 ML/DL
O2 SATURATION: 96.8 % (ref 92–98.5)
O2HB: 96.1 % (ref 94–97)
OPERATOR ID: 101
OPERATOR ID: ABNORMAL
PATIENT TEMP: 37 C
PCO2: 31.4 MMHG (ref 35–45)
PDW BLD-RTO: 14 FL (ref 11.5–15)
PH BLOOD GAS: 7.54 (ref 7.35–7.45)
PH FLUID: ABNORMAL
PLATELET # BLD: 407 E9/L (ref 130–450)
PMV BLD AUTO: 9.5 FL (ref 7–12)
PO2: 81.7 MMHG (ref 75–100)
POTASSIUM REFLEX MAGNESIUM: 3.9 MMOL/L (ref 3.5–5)
PRO-BNP: 235 PG/ML (ref 0–450)
PROTEIN FLUID: 2.7 G/DL
RBC # BLD: 3.21 E12/L (ref 3.5–5.5)
RBC FLUID: 9000 /UL
SODIUM BLD-SCNC: 135 MMOL/L (ref 132–146)
SOURCE, BLOOD GAS: ABNORMAL
SOURCE, BLOOD GAS: ABNORMAL
THB: 10.1 G/DL (ref 11.5–16.5)
TIME ANALYZED: 1551
TIME ANALYZED: 842
WBC # BLD: 5.7 E9/L (ref 4.5–11.5)

## 2021-10-01 PROCEDURE — 74018 RADEX ABDOMEN 1 VIEW: CPT

## 2021-10-01 PROCEDURE — 2500000003 HC RX 250 WO HCPCS: Performed by: TRANSPLANT SURGERY

## 2021-10-01 PROCEDURE — 80048 BASIC METABOLIC PNL TOTAL CA: CPT

## 2021-10-01 PROCEDURE — 2580000003 HC RX 258: Performed by: PHYSICIAN ASSISTANT

## 2021-10-01 PROCEDURE — 87070 CULTURE OTHR SPECIMN AEROBIC: CPT

## 2021-10-01 PROCEDURE — 1200000000 HC SEMI PRIVATE

## 2021-10-01 PROCEDURE — 0W9B3ZZ DRAINAGE OF LEFT PLEURAL CAVITY, PERCUTANEOUS APPROACH: ICD-10-PCS | Performed by: INTERNAL MEDICINE

## 2021-10-01 PROCEDURE — 82805 BLOOD GASES W/O2 SATURATION: CPT

## 2021-10-01 PROCEDURE — 82150 ASSAY OF AMYLASE: CPT

## 2021-10-01 PROCEDURE — 94640 AIRWAY INHALATION TREATMENT: CPT

## 2021-10-01 PROCEDURE — 6360000002 HC RX W HCPCS: Performed by: NURSE PRACTITIONER

## 2021-10-01 PROCEDURE — 82947 ASSAY GLUCOSE BLOOD QUANT: CPT

## 2021-10-01 PROCEDURE — 82378 CARCINOEMBRYONIC ANTIGEN: CPT

## 2021-10-01 PROCEDURE — 83986 ASSAY PH BODY FLUID NOS: CPT

## 2021-10-01 PROCEDURE — 84157 ASSAY OF PROTEIN OTHER: CPT

## 2021-10-01 PROCEDURE — 2700000000 HC OXYGEN THERAPY PER DAY

## 2021-10-01 PROCEDURE — 6370000000 HC RX 637 (ALT 250 FOR IP): Performed by: SURGERY

## 2021-10-01 PROCEDURE — 84999 UNLISTED CHEMISTRY PROCEDURE: CPT

## 2021-10-01 PROCEDURE — 82962 GLUCOSE BLOOD TEST: CPT

## 2021-10-01 PROCEDURE — 36415 COLL VENOUS BLD VENIPUNCTURE: CPT

## 2021-10-01 PROCEDURE — C9113 INJ PANTOPRAZOLE SODIUM, VIA: HCPCS | Performed by: STUDENT IN AN ORGANIZED HEALTH CARE EDUCATION/TRAINING PROGRAM

## 2021-10-01 PROCEDURE — 6370000000 HC RX 637 (ALT 250 FOR IP): Performed by: PHYSICIAN ASSISTANT

## 2021-10-01 PROCEDURE — 93970 EXTREMITY STUDY: CPT

## 2021-10-01 PROCEDURE — 85378 FIBRIN DEGRADE SEMIQUANT: CPT

## 2021-10-01 PROCEDURE — 85025 COMPLETE CBC W/AUTO DIFF WBC: CPT

## 2021-10-01 PROCEDURE — 71046 X-RAY EXAM CHEST 2 VIEWS: CPT

## 2021-10-01 PROCEDURE — 2580000003 HC RX 258: Performed by: STUDENT IN AN ORGANIZED HEALTH CARE EDUCATION/TRAINING PROGRAM

## 2021-10-01 PROCEDURE — 88305 TISSUE EXAM BY PATHOLOGIST: CPT

## 2021-10-01 PROCEDURE — 83880 ASSAY OF NATRIURETIC PEPTIDE: CPT

## 2021-10-01 PROCEDURE — 87205 SMEAR GRAM STAIN: CPT

## 2021-10-01 PROCEDURE — 6360000002 HC RX W HCPCS: Performed by: TRANSPLANT SURGERY

## 2021-10-01 PROCEDURE — 71045 X-RAY EXAM CHEST 1 VIEW: CPT

## 2021-10-01 PROCEDURE — 88112 CYTOPATH CELL ENHANCE TECH: CPT

## 2021-10-01 PROCEDURE — 83615 LACTATE (LD) (LDH) ENZYME: CPT

## 2021-10-01 PROCEDURE — 89051 BODY FLUID CELL COUNT: CPT

## 2021-10-01 PROCEDURE — 6360000002 HC RX W HCPCS: Performed by: STUDENT IN AN ORGANIZED HEALTH CARE EDUCATION/TRAINING PROGRAM

## 2021-10-01 RX ORDER — SODIUM CHLORIDE 9 MG/ML
10 INJECTION INTRAVENOUS 2 TIMES DAILY
Status: DISCONTINUED | OUTPATIENT
Start: 2021-10-01 | End: 2021-10-08 | Stop reason: HOSPADM

## 2021-10-01 RX ORDER — LANOLIN ALCOHOL/MO/W.PET/CERES
3 CREAM (GRAM) TOPICAL NIGHTLY PRN
Status: DISCONTINUED | OUTPATIENT
Start: 2021-10-01 | End: 2021-10-08 | Stop reason: HOSPADM

## 2021-10-01 RX ORDER — SODIUM CHLORIDE 0.9 % (FLUSH) 0.9 %
5-40 SYRINGE (ML) INJECTION EVERY 12 HOURS SCHEDULED
Status: DISCONTINUED | OUTPATIENT
Start: 2021-10-01 | End: 2021-10-08 | Stop reason: HOSPADM

## 2021-10-01 RX ORDER — PANTOPRAZOLE SODIUM 40 MG/10ML
40 INJECTION, POWDER, LYOPHILIZED, FOR SOLUTION INTRAVENOUS 2 TIMES DAILY
Status: DISCONTINUED | OUTPATIENT
Start: 2021-10-01 | End: 2021-10-08 | Stop reason: HOSPADM

## 2021-10-01 RX ORDER — HEPARIN SODIUM (PORCINE) LOCK FLUSH IV SOLN 100 UNIT/ML 100 UNIT/ML
3 SOLUTION INTRAVENOUS EVERY 12 HOURS SCHEDULED
Status: DISCONTINUED | OUTPATIENT
Start: 2021-10-01 | End: 2021-10-08 | Stop reason: HOSPADM

## 2021-10-01 RX ORDER — SODIUM CHLORIDE 0.9 % (FLUSH) 0.9 %
5-40 SYRINGE (ML) INJECTION PRN
Status: DISCONTINUED | OUTPATIENT
Start: 2021-10-01 | End: 2021-10-08 | Stop reason: HOSPADM

## 2021-10-01 RX ORDER — LIDOCAINE HYDROCHLORIDE 10 MG/ML
5 INJECTION, SOLUTION EPIDURAL; INFILTRATION; INTRACAUDAL; PERINEURAL ONCE
Status: DISCONTINUED | OUTPATIENT
Start: 2021-10-01 | End: 2021-10-08 | Stop reason: HOSPADM

## 2021-10-01 RX ORDER — HEPARIN SODIUM (PORCINE) LOCK FLUSH IV SOLN 100 UNIT/ML 100 UNIT/ML
3 SOLUTION INTRAVENOUS PRN
Status: DISCONTINUED | OUTPATIENT
Start: 2021-10-01 | End: 2021-10-08 | Stop reason: HOSPADM

## 2021-10-01 RX ORDER — DEXTROSE, SODIUM CHLORIDE, AND POTASSIUM CHLORIDE 5; .45; .15 G/100ML; G/100ML; G/100ML
INJECTION INTRAVENOUS CONTINUOUS
Status: DISCONTINUED | OUTPATIENT
Start: 2021-10-01 | End: 2021-10-04

## 2021-10-01 RX ORDER — DIPHENHYDRAMINE HYDROCHLORIDE 50 MG/ML
25 INJECTION INTRAMUSCULAR; INTRAVENOUS EVERY 6 HOURS PRN
Status: DISCONTINUED | OUTPATIENT
Start: 2021-10-01 | End: 2021-10-08 | Stop reason: HOSPADM

## 2021-10-01 RX ORDER — SODIUM CHLORIDE 9 MG/ML
25 INJECTION, SOLUTION INTRAVENOUS PRN
Status: DISCONTINUED | OUTPATIENT
Start: 2021-10-01 | End: 2021-10-08 | Stop reason: HOSPADM

## 2021-10-01 RX ADMIN — Medication 10 ML: at 10:58

## 2021-10-01 RX ADMIN — IPRATROPIUM BROMIDE AND ALBUTEROL SULFATE 1 AMPULE: 2.5; .5 SOLUTION RESPIRATORY (INHALATION) at 12:19

## 2021-10-01 RX ADMIN — POTASSIUM CHLORIDE, DEXTROSE MONOHYDRATE AND SODIUM CHLORIDE: 150; 5; 450 INJECTION, SOLUTION INTRAVENOUS at 17:07

## 2021-10-01 RX ADMIN — SODIUM CHLORIDE, PRESERVATIVE FREE 10 ML: 5 INJECTION INTRAVENOUS at 17:08

## 2021-10-01 RX ADMIN — SODIUM CHLORIDE, PRESERVATIVE FREE 10 ML: 5 INJECTION INTRAVENOUS at 17:07

## 2021-10-01 RX ADMIN — IPRATROPIUM BROMIDE AND ALBUTEROL SULFATE 1 AMPULE: 2.5; .5 SOLUTION RESPIRATORY (INHALATION) at 22:28

## 2021-10-01 RX ADMIN — BENZOCAINE, BUTAMBEN, AND TETRACAINE HYDROCHLORIDE 1 SPRAY: .028; .004; .004 AEROSOL, SPRAY TOPICAL at 22:41

## 2021-10-01 RX ADMIN — DIPHENHYDRAMINE HYDROCHLORIDE 25 MG: 50 INJECTION, SOLUTION INTRAMUSCULAR; INTRAVENOUS at 20:19

## 2021-10-01 RX ADMIN — PANTOPRAZOLE SODIUM 40 MG: 40 INJECTION, POWDER, FOR SOLUTION INTRAVENOUS at 17:06

## 2021-10-01 RX ADMIN — IPRATROPIUM BROMIDE AND ALBUTEROL SULFATE 1 AMPULE: 2.5; .5 SOLUTION RESPIRATORY (INHALATION) at 08:10

## 2021-10-01 RX ADMIN — IPRATROPIUM BROMIDE AND ALBUTEROL SULFATE 1 AMPULE: 2.5; .5 SOLUTION RESPIRATORY (INHALATION) at 16:57

## 2021-10-01 ASSESSMENT — PAIN DESCRIPTION - FREQUENCY: FREQUENCY: INTERMITTENT

## 2021-10-01 ASSESSMENT — PAIN SCALES - GENERAL: PAINLEVEL_OUTOF10: 7

## 2021-10-01 ASSESSMENT — PAIN DESCRIPTION - ORIENTATION: ORIENTATION: MID

## 2021-10-01 ASSESSMENT — PAIN DESCRIPTION - PAIN TYPE: TYPE: ACUTE PAIN

## 2021-10-01 ASSESSMENT — PAIN DESCRIPTION - PROGRESSION: CLINICAL_PROGRESSION: NOT CHANGED

## 2021-10-01 ASSESSMENT — PAIN DESCRIPTION - LOCATION: LOCATION: ABDOMEN

## 2021-10-01 ASSESSMENT — PAIN DESCRIPTION - ONSET: ONSET: ON-GOING

## 2021-10-01 NOTE — PROGRESS NOTES
Transfer has been initiated via transfer center to [facility] at 1:45 pm   Will wait to hear back from them     Addendum - Pt accepted to [facility] by Dr. Geovanna Bourgeois .  Await bed

## 2021-10-01 NOTE — PROGRESS NOTES
Patient complaining of shortness of breath this AM. Checked O2 Sat multiple times and is 95-98% on 6L. Notified Florentin Parker NP and she said she will put in new orders for the patient.

## 2021-10-01 NOTE — PROCEDURES
Thoracentesis Procedure Note    Pre-operative Diagnosis: left Pleural Effusion    Post-operative Diagnosis: same    Indications: Nitesh Grullon is a 67 y.o. female with a  has a past medical history of Depression, GERD (gastroesophageal reflux disease), and Hypertension. found to have a Pleural Effusion  ultrasound guided    Assistant-  Bedside nurse    Consent: After informed consent & appropriate time out protocol was noted & obtained. Risks/Benefits/Alternatives of the procedure were discussed including: infection, bleeding, pain, pneumothorax. Procedure Details:    Under sterile conditions  the patient was placed in the sitting position. The skin was prepped and draped with Chloraprep solution and sterile drapes were utilized. 1% plain lidocaine was used to anesthetize the skin, subcutaneous tissue and parietal pleura. An 8 Surinamese catheter was advanced into the pleural space. The fluid was obtained without any difficulties and minimal blood loss. A total of 400cc fluid was removed. The fluid was straw colored/cloudy in color. A dressing was applied to the wound and wound care instructions were provided. The fluid was sent for analysis. Findings  400 ml of cloudy pleural fluid was obtained. A sample was sent for protein, cholesterol, and LDH,  Pathology for cytolology, cytogenetics, flow, and cell counts, as well as for infection analysis. Complications:  None; patient tolerated the procedure well. Condition: stable    Plan  A follow up chest x-ray was ordered and shows improvement in L lung aeration. No pneumothorax.     Electronically signed by Lisa Bush MD on 10/1/2021 at 5:58 PM

## 2021-10-01 NOTE — PROGRESS NOTES
Hepatobiliary and Pancreatic SURGERY  DAILY PROGRESS NOTE  10/1/2021    Subjective:  Patient is reporting that she had trouble breathing overnight. She is satting at 98% on 6 L O2 nasal cannula. Objective:  /62   Pulse 85   Temp 98 °F (36.7 °C) (Oral)   Resp 16   Ht 5' 6\" (1.676 m)   Wt 180 lb (81.6 kg)   SpO2 98%   BMI 29.05 kg/m²     General appearance: alert, cooperative and in no acute distress. Eyes: grossly normal  Lungs: nonlabored breathing on 6 L O2 nasal cannula  Heart: regular rate, normotensive  Abdomen: soft, non-tender, non distended, incisions C/D/I  Skin: No skin abnormalities  Neurologic: Alert and oriented x 3. Grossly normal  Musculoskeletal: No clubbing cyanosis or edema    Assessment/Plan:  67 y.o. female with delayed gastric emptying s/p Whipple procedure 9/9/21 at the [facility]. Still recommend transfer to [facility] to see her surgeon Dr. Golden Low for further evaluation of emesis and delayed gastric emptying. She does not want to return at this time because she feels her problem is only respiratory, but would go back if she needed surgery.   Will check UGI with follow through, evaluate for DGE    Electronically signed by Fawn Lizama MD on 10/1/2021 at 7:27 AM  Electronically signed by Andrew Chu MD on 10/1/2021 at 9:17 AM

## 2021-10-01 NOTE — PATIENT CARE CONFERENCE
[facility] Quality Flow/Interdisciplinary Rounds Progress Note        Quality Flow Rounds held on October 1, 2021    Disciplines Attending:  Bedside Nurse, ,  and Nursing Unit Leadership    Marita Martines was admitted on 9/29/2021 11:13 PM    Anticipated Discharge Date:  Expected Discharge Date: 10/03/21    Disposition:    Darren Score:  Darren Scale Score: 17    Readmission Risk              Risk of Unplanned Readmission:  12           Discussed patient goal for the day, patient clinical progression, and barriers to discharge.   The following Goal(s) of the Day/Commitment(s) have been identified:  Labs - Report Results      Zackery Story RN  October 1, 2021

## 2021-10-01 NOTE — SIGNIFICANT EVENT
RRT this AM for SOB  Satting ~100% on 6LNC  Pt somnolent, somewhat diaphoretic will awaken, says she cannot get a good breath  Says did not sleep overnight d/t not being able to breathe well  No c/o CP, nausea  Breath sounds clear  CXR on admission was clear  ABG checked did not show significant CO2 retention, don't see real benefit of BiPAP  Re-checking CXR but again breath sounds clear  Was going to check d-dimer though CTA on admission showed no PE  EKG at time of RRT was non-ischemic  Daughter present throughout and updated as events unfolded    RRT concluded w pt somnolent though awake and oriented, says having SOB but with stable vitals and with ACS, PE, pneumonia, CHF effectively ruled out    Electronically signed by Honorio Lyles DO on 10/1/2021 at 10:03 AM

## 2021-10-01 NOTE — CARE COORDINATION
10/1/2021  Social Work Discharge Planning:RRT this morning. SW went to see Pt;howver, Pt was out for testing. Chart screened. Pt is on 5l o2 here and awaiting therapy evellen. Had a recent Whipple procedure at [facility]. Will continue to follow. Electronically signed by ELIAN Salinas on 10/1/2021 at 2:09 PM

## 2021-10-01 NOTE — PROGRESS NOTES
Comprehensive Nutrition Assessment    Type and Reason for Visit:  Initial, Consult    Nutrition Recommendations/Plan: Continue Diet and ADAT once med appropriate. Will Start Ensure High Toñito ONS TID. If pt unable to tolerate PO intake x next few days, would then recommend to consider EN (post-pyloric feeds feasible?) vs PN (if EN not appropriate). Please consult for rec's if EN/PN needed. If Needed;  EN Recommendations:  Standard w/o Fiber (Osmolite 1.2) @ 60 ml/hr (Goal) Continuous x 24 hrs/d to provide 1440 ml TV, 1728 kcals, 80 gm Pro, 1181 ml water. 60 ml flush q 4 hrs to provide 1541 ml total water (TF+Flush). PN Recommendations:  Adult PN 3-in-1 Central Standard @ 70 ml/hr (Goal) Continuous x 24 hrs/d to provide 1680 ml TV, 1739 kcals, 84 gm AA. Highly recommend to start at half of goal rate and increase slowly as tolerated to goal for either EN or PN. Nutrition Assessment:  Pt adm w/ SOB and intermittent N/V s/p recent d/c from [facility] s/p Whipple (9/9/21) 2/2 IPMN w/ noted post-op PNA as well as possible post-op Ileus/gastric-pneumatosis and delayed gastric emptying requiring TPN (TPN d/c'd 9/27) all per [facility]/oncology note review. PMHx GERD, HTN; adm w/ Pleural Effusion. Pt at risk d/t poor intake x ~3 wks d/t altered GI 2/2 h/o Post-op Ileus/PNA w/ IPMN and Delayed Gastric Emptying. Will Start ONS however may need to consider TPN if pt unable to tolerate PO intake x next few days.     Malnutrition Assessment:  Malnutrition Status:  Insufficient data    Context:  Acute Illness     Findings of the 6 clinical characteristics of malnutrition:  Energy Intake:  7 - 50% or less of estimated energy requirements for 5 or more days  Weight Loss:  Unable to assess (2/2 poor EMR wt hx pta)     Body Fat Loss:  Unable to assess     Muscle Mass Loss:  Unable to assess    Fluid Accumulation:  No significant fluid accumulation     Strength:  Not Performed    Estimated Daily Nutrient Needs:  Energy (kcal):  3068-0828 (MSJx1. 2SF); Weight Used for Energy Requirements:  Current     Protein (g):  75-90 (1.3-1.5); Weight Used for Protein Requirements:  Ideal        Fluid (ml/day):  5985-1171; Method Used for Fluid Requirements:  1 ml/kcal      Nutrition Related Findings:  A&O, dentition WNL, Abd/BS WDL, No edema, no I/O's noted      Wounds:  None       Current Nutrition Therapies:    ADULT DIET; Full Liquid  PN-Adult  3-in-1 Central Line (Standard)    Anthropometric Measures:  · Height: 5' 6\" (167.6 cm)  · Current Body Weight: 180 lb (81.6 kg) (bed 10/1)   · Admission Body Weight: 180 lb (81.6 kg) (bed 10/1)    · Usual Body Weight:  (UTO UBW 2/2 poor EMR wt hx pta)     · Ideal Body Weight: 130 lbs; % Ideal Body Weight     · BMI: 29.1  · Adjusted Body Weight:  ; No Adjustment   · Adjusted BMI:      · BMI Categories: Overweight (BMI 25.0-29. 9)       Nutrition Diagnosis:   · Inadequate oral intake related to altered GI function (2/2 h/o Post-op Ileus/PNA w/ IPMN and Delayed Gastric Emptying) as evidenced by intake 26-50%, poor intake prior to admission, weight loss, GI abnormality, nausea, vomiting      Nutrition Interventions:   Food and/or Nutrient Delivery:  Continue Current Diet, Start Oral Nutrition Supplement (Continue Diet and ADAT once med appropriate. Will Start Ensure High Toñito ONS TID. If pt unable to tolerate PO intake x next few days, would then recommend to consider EN vs PN (pending further G. I. work-up). Please consult for rec's if EN/PN needed.)  Nutrition Education/Counseling:  Education not indicated   Coordination of Nutrition Care:  Continue to monitor while inpatient    Goals:  PO Intake >50% of meals/ONS.        Nutrition Monitoring and Evaluation:   Behavioral-Environmental Outcomes:  None Identified   Food/Nutrient Intake Outcomes:  Diet Advancement/Tolerance, Food and Nutrient Intake, Supplement Intake  Physical Signs/Symptoms Outcomes:  Biochemical Data, Constipation, GI Status, Nausea or

## 2021-10-01 NOTE — PROGRESS NOTES
Jyoti Loomis M.D.,[facility]  Humberto Jones D.O., F.A.C.O.I., Idalia Martin M.D. Jermaine Weaver M.D. Keny Salcedo D.O. Daily Pulmonary Progress Note    Patient:  Jessica Bajwa 67 y.o. female MRN: [id number]     Date of Service: 10/1/2021      Synopsis     We are following patient for hypoxia, pleural effusion    \"CC\" shortness of breath    Code status: Full      Subjective      Patient was seen and examined. Lying in bed. States that she feels very weak and tired. RRT this morning for lethargy and hypersomnolence. Complaining of shortness of breath. Difficulty taking deep breaths and. CTA chest yesterday with no evidence of PE or pneumonia. Small left-sided pleural effusion with compressive atelectasis. Plan to perform bedside ultrasound of chest today per Dr. Breanna Rae. Family present at bedside questions answered. ABGs reviewed. Patient denies nausea but has intermittent episodes of emesis. Review of Systems:     Constitutional: Denies fever, weight loss, night sweats, positive weakness and fatigue  Skin: Denies pigmentation, dark lesions, and rashes   HEENT: Denies hearing loss, tinnitus, ear drainage, epistaxis, sore throat, and hoarseness. Cardiovascular: Denies palpitations, chest pain, and chest pressure.   Respiratory: Dyspnea  Gastrointestinal:  Episodes of nausea and vomiting  Genitourinary: Denies dysuria, frequency, urgency or hematuria  Musculoskeletal: Denies myalgias, muscle weakness, and bone pain  Neurological: Denies dizziness, vertigo, headache, and focal weakness  Psychological: Denies anxiety and depression  Endocrine: Denies heat intolerance and cold intolerance  Hematopoietic/Lymphatic: Denies bleeding problems and blood transfusions    24-hour events:  RRT this a.m. for hypersomnolence    Objective   Vitals: /62   Pulse 84   Temp 98.4 °F (36.9 °C) (Oral)   Resp 16   Ht 5' 6\" (1.676 m)   Wt 180 lb (81.6 kg)   SpO2 98%   BMI 29.05 kg/m² I/O:  No intake or output data in the 24 hours ending 10/01/21 1435    Vent Information  SpO2: 98 %                CURRENT MEDS :  Scheduled Meds:   sodium chloride (PF)  10 mL IntraVENous Daily    aspirin  81 mg Oral Daily    losartan  50 mg Oral Daily    pantoprazole  40 mg Oral Daily    sertraline  25 mg Oral Daily    sodium chloride flush  10 mL IntraVENous 2 times per day    ipratropium-albuterol  1 ampule Inhalation Q4H WA    furosemide  20 mg IntraVENous BID       Physical Exam:  General Appearance: appears comfortable in no acute distress. HEENT: Normocephalic atraumatic without obvious abnormality   Neck: Lips, mucosa, and tongue normal.  Supple, symmetrical, trachea midline, no adenopathy;thyroid:  no enlargement/tenderness/nodules or JVD. Lung: Breath sounds CTA slightly diminished hypoventilating. Respirations   unlabored. Symmetrical expansion. Heart: RRR, normal S1, S2. No MRG  Abdomen: Soft,  No bruit or organomegaly. Abdomen with slight distention  Extremities: Pedal pulses 2+ symmetric b/l. Extremities normal, no cyanosis, clubbing, or edema. Musculokeletal: No joint swelling, no muscle tenderness. ROM normal in all joints of extremities. Neurologic: Mental status: Alert and Oriented X3 .   Feeling hypersomnolence and fatigue    Pertinent/ New Labs and Imaging Studies     Imaging Personally Reviewed:      CTA chest  No identified pulmonary embolism.       Left greater than right pleural effusions with left greater than right lower   lobe atelectasis.       Irregular opacity in the right lung apex may represent an area of scarring or   fibrosis.  Neoplastic process is difficult to exclude.  If no previous study   is available for comparison, further characterization with PET scan would be   recommended.  Alternatively, follow-up CT in 3 months if PET scan cannot be   obtained.       Tiny areas of subpleural nodularity bilaterally are favored to be incidental   but could also be followed on subsequent exams.               Echo:  [facility] 9/17/2021  CONCLUSIONS:   - Exam indication: Hypotension   - The left ventricle is normal in size. Left ventricular systolic function is   normal. EF = 68 ± 5% (2D biplane) Definity contrast used for endocardial border   detection. Normal left ventricular diastolic function.   - The right ventricle is normal in size. Right ventricular systolic function is   normal.   - Estimated right ventricular systolic pressure is not reported due to an   insufficient tricuspid regurgitation signal. Estimated right atrial pressure is 8   mmHg based on IVC assessment. - There are no significant valvular abnormalities. - The patient has not had a prior CC echocardiographic exam for comparison.      [facility] CT chest without contrast 9/16/2021  1.  Bilateral low-density pleural effusions, left greater than right.     Dependent consolidative airspace opacities associated with volume loss in   both lower lobes, most likely secondary to nonspecific atelectasis. 2. Heterogeneous airspace/groundglass opacity in the right upper lobe,   likely secondary to focal pneumonia.  Additional peribronchiolar   groundglass and airspace opacities which are likely infectious in nature   are identified in the right lung. 3. Multiple vague, tiny centrilobular nodular densities are scattered in   both upper lobes, most commonly secondary to nonspecific inflammatory   bronchiolitis, possibly infectious in nature. 4. No region of intrathoracic lymphadenopathy is identified.       Ultrasound legs 9/16/2021        Non-Invasive Vascular Laboratory   [address] [facility] Portable       Lower Extremity Venous Duplex   Bilateral/Complete     Date of service/time: 9/16/2021 8:59:06 AM   MRN: [id number]   Accession #: [id number]   ----------   Technically difficult exam due to patient positioning and edema. RIGHT SIDE - DEEP VEINS   Negative for acute deep vein thrombosis.    Only segments visualized of the peroneal veins. LEFT SIDE - DEEP VEINS   Negative for acute deep vein thrombosis. Only segments visualized of the peroneal veins.                 Labs:  Results for Alvin White (MRN [id number]) as of 10/1/2021 14:35   Ref. Range 9/30/2021 01:17 10/1/2021 08:42   Source: Unknown Blood Arterial Blood Arterial   pH, Blood Gas Latest Ref Range: 7.350 - 7.450  7.498 (H) 7.537 (H)   PCO2 Latest Ref Range: 35.0 - 45.0 mmHg 36.8 31.4 (L)   pO2 Latest Ref Range: 75.0 - 100.0 mmHg 95.8 81.7   HCO3 Latest Ref Range: 22.0 - 26.0 mmol/L 27.9 (H) 26.1 (H)   Base Excess Latest Ref Range: -3.0 - 3.0 mmol/L 4.6 (H) 3.7 (H)   O2 Sat Latest Ref Range: 92.0 - 98.5 % 97.2 96.8     Lab Results   Component Value Date    WBC 5.7 10/01/2021    HGB 9.1 10/01/2021    HCT 29.5 10/01/2021    MCV 91.9 10/01/2021    MCH 28.3 10/01/2021    MCHC 30.8 10/01/2021    RDW 14.0 10/01/2021     10/01/2021    MPV 9.5 10/01/2021     Lab Results   Component Value Date     10/01/2021    K 3.9 10/01/2021    CL 97 10/01/2021    CO2 30 10/01/2021    BUN 25 10/01/2021    CREATININE 0.6 10/01/2021    LABALBU 3.2 09/30/2021    CALCIUM 8.3 10/01/2021    GFRAA >60 10/01/2021    LABGLOM >60 10/01/2021     No results found for: PROTIME, INR  Recent Labs     10/01/21  1051   PROBNP 235     No results for input(s): PROCAL in the last 72 hours. This SmartLink has not been configured with any valid records. Micro:  No results for input(s): CULTRESP in the last 72 hours. No results for input(s): LABGRAM in the last 72 hours. No results for input(s): LEGUR in the last 72 hours. No results for input(s): STREPNEUMAGU in the last 72 hours. No results for input(s): LP1UAG in the last 72 hours. Surgical pathology 9/9/2021  Specimen originated from [facility]     Specimen #: [id number]   Submitting Physician: SALLY Norton.Luis)       __________________________________________________________________________ fluid.  5. Dr. Myranda Vargas and recommendations appreciated. 6. Full liquid diet  7. Replace electrolytes. Antiemetics. 8. GI prophylaxis and Protonix.       This plan of care was reviewed in collaboration with Dr. Ppee Presley  Electronically signed by NAMRATA Anderson CNP on 10/1/2021 at 2:35 PM    I personally saw, examined, and cared for the patient. Labs, medications, radiographs reviewed.  I agree with history exam and plans detailed in NP note with the following additions:    RRT today for shortness of breath and fatigue  US of the left chest revealed an acceptable effusion for thoracentesis  We proceeded with this at the bedside with removal of 400cc fluid  Pleural fluid sent  CXR better  Vomiting/delayed gastric emptying per surgical services    Electronically signed by James Crum MD on 10/1/2021 at 6:02 PM

## 2021-10-01 NOTE — PROGRESS NOTES
Subjective: The patient is awake and alert. No acute events overnight. Denies chest pain, angina, SOB   RRT this morning for worsening SOB    Objective:    /62   Pulse 84   Temp 98.4 °F (36.9 °C) (Oral)   Resp 16   Ht 5' 6\" (1.676 m)   Wt 180 lb (81.6 kg)   SpO2 98%   BMI 29.05 kg/m²     No intake/output data recorded. No intake/output data recorded. General appearance: NAD, conversant  HEENT: AT/NC, MMM  Neck: FROM, supple  Lungs: diminished  CV: RRR, no MRGs  Vasc: Radial pulses 2+  Abdomen: Soft, non-tender; no masses or HSM, hypoactive BS, disteneded  Extremities: No peripheral edema or digital cyanosis  Skin: no rash, lesions or ulcers  Psych: Alert and oriented to person, place and time  Neuro: Alert and interactive     Recent Labs     09/30/21  0041 10/01/21  0423   WBC 8.1 5.7   HGB 10.5* 9.1*   HCT 33.4* 29.5*   * 407       Recent Labs     09/30/21  0041 10/01/21  0423    135   K 4.0 3.9   CL 95* 97*   CO2 29 30*   BUN 25* 25*   CREATININE 0.7 0.6   CALCIUM 9.2 8.3*       Assessment:    Principal Problem:    Hypoxia  Active Problems:    Pleural effusion  Resolved Problems:    * No resolved hospital problems. *      Plan:    Patient is a 67year old female with a PMH of depression, GERD and hypertension who presents to the emergency room for SOB and nausea and vomiting.     SOB d/t pleural effusions   - CTA Pulm: Left and right pleural effusions with the left larger than the right as well as bilateral lower lobe atelectasis. -Monitor labs - WBC   -procal, sed rate, CRP, blood cultures pending  -Duonebs  -Incentive spirometry   -5L NC o2 - not on any at home. -Monitor O2 saturations and supplement oxygen to keep saturations greater than 93%, wean as necessary  -Left thoracentesis 10/1/21  -D-Dimer 4,356 -?  Concern for pe , initial cta negative , reconsider repeat CTA if worsening pulm status   -US bilateral lowers- Echogenic thrombus identified in the left peroneal

## 2021-10-01 NOTE — PROGRESS NOTES
Pt's bladder scan volume was 27cc. Pt and family stated that she voided a couple times through the night however nothing was recorded. Hat placed in bathroom for accurate I/O's.

## 2021-10-02 ENCOUNTER — APPOINTMENT (OUTPATIENT)
Dept: GENERAL RADIOLOGY | Age: 76
DRG: 186 | End: 2021-10-02
Payer: MEDICARE

## 2021-10-02 LAB
ALBUMIN SERPL-MCNC: 2.6 G/DL (ref 3.5–5.2)
ALP BLD-CCNC: 103 U/L (ref 35–104)
ALT SERPL-CCNC: 23 U/L (ref 0–32)
ANION GAP SERPL CALCULATED.3IONS-SCNC: 8 MMOL/L (ref 7–16)
AST SERPL-CCNC: 15 U/L (ref 0–31)
BILIRUB SERPL-MCNC: 0.5 MG/DL (ref 0–1.2)
BUN BLDV-MCNC: 21 MG/DL (ref 6–23)
CALCIUM SERPL-MCNC: 8.3 MG/DL (ref 8.6–10.2)
CEA,FLUID: 3.7 NG/ML
CHLORIDE BLD-SCNC: 98 MMOL/L (ref 98–107)
CO2: 29 MMOL/L (ref 22–29)
CREAT SERPL-MCNC: 0.6 MG/DL (ref 0.5–1)
FLUID TYPE: NORMAL
GFR AFRICAN AMERICAN: >60
GFR NON-AFRICAN AMERICAN: >60 ML/MIN/1.73
GLUCOSE BLD-MCNC: 185 MG/DL (ref 74–99)
HCT VFR BLD CALC: 29.8 % (ref 34–48)
HEMOGLOBIN: 9.3 G/DL (ref 11.5–15.5)
MAGNESIUM: 2.3 MG/DL (ref 1.6–2.6)
MCH RBC QN AUTO: 28 PG (ref 26–35)
MCHC RBC AUTO-ENTMCNC: 31.2 % (ref 32–34.5)
MCV RBC AUTO: 89.8 FL (ref 80–99.9)
METER GLUCOSE: 159 MG/DL (ref 74–99)
METER GLUCOSE: 219 MG/DL (ref 74–99)
PDW BLD-RTO: 14 FL (ref 11.5–15)
PHOSPHORUS: 3.1 MG/DL (ref 2.5–4.5)
PLATELET # BLD: 388 E9/L (ref 130–450)
PMV BLD AUTO: 10 FL (ref 7–12)
POTASSIUM SERPL-SCNC: 3.6 MMOL/L (ref 3.5–5)
RBC # BLD: 3.32 E12/L (ref 3.5–5.5)
SODIUM BLD-SCNC: 135 MMOL/L (ref 132–146)
TOTAL PROTEIN: 5.2 G/DL (ref 6.4–8.3)
TRIGL SERPL-MCNC: 49 MG/DL (ref 0–149)
WBC # BLD: 5.9 E9/L (ref 4.5–11.5)

## 2021-10-02 PROCEDURE — 6370000000 HC RX 637 (ALT 250 FOR IP): Performed by: PHYSICIAN ASSISTANT

## 2021-10-02 PROCEDURE — 84478 ASSAY OF TRIGLYCERIDES: CPT

## 2021-10-02 PROCEDURE — 82962 GLUCOSE BLOOD TEST: CPT

## 2021-10-02 PROCEDURE — 2500000003 HC RX 250 WO HCPCS: Performed by: TRANSPLANT SURGERY

## 2021-10-02 PROCEDURE — C9113 INJ PANTOPRAZOLE SODIUM, VIA: HCPCS | Performed by: STUDENT IN AN ORGANIZED HEALTH CARE EDUCATION/TRAINING PROGRAM

## 2021-10-02 PROCEDURE — 83735 ASSAY OF MAGNESIUM: CPT

## 2021-10-02 PROCEDURE — 1200000000 HC SEMI PRIVATE

## 2021-10-02 PROCEDURE — 84100 ASSAY OF PHOSPHORUS: CPT

## 2021-10-02 PROCEDURE — 6370000000 HC RX 637 (ALT 250 FOR IP): Performed by: SURGERY

## 2021-10-02 PROCEDURE — 80053 COMPREHEN METABOLIC PANEL: CPT

## 2021-10-02 PROCEDURE — 94640 AIRWAY INHALATION TREATMENT: CPT

## 2021-10-02 PROCEDURE — 2580000003 HC RX 258: Performed by: STUDENT IN AN ORGANIZED HEALTH CARE EDUCATION/TRAINING PROGRAM

## 2021-10-02 PROCEDURE — 99232 SBSQ HOSP IP/OBS MODERATE 35: CPT | Performed by: SURGERY

## 2021-10-02 PROCEDURE — 2580000003 HC RX 258: Performed by: PHYSICIAN ASSISTANT

## 2021-10-02 PROCEDURE — 36415 COLL VENOUS BLD VENIPUNCTURE: CPT

## 2021-10-02 PROCEDURE — 6360000002 HC RX W HCPCS: Performed by: STUDENT IN AN ORGANIZED HEALTH CARE EDUCATION/TRAINING PROGRAM

## 2021-10-02 PROCEDURE — 2700000000 HC OXYGEN THERAPY PER DAY

## 2021-10-02 PROCEDURE — 6360000002 HC RX W HCPCS: Performed by: TRANSPLANT SURGERY

## 2021-10-02 PROCEDURE — 85027 COMPLETE CBC AUTOMATED: CPT

## 2021-10-02 PROCEDURE — 2580000003 HC RX 258: Performed by: EMERGENCY MEDICINE

## 2021-10-02 PROCEDURE — 71045 X-RAY EXAM CHEST 1 VIEW: CPT

## 2021-10-02 RX ADMIN — Medication 5 ML: at 10:00

## 2021-10-02 RX ADMIN — BENZOCAINE, BUTAMBEN, AND TETRACAINE HYDROCHLORIDE 1 SPRAY: .028; .004; .004 AEROSOL, SPRAY TOPICAL at 11:10

## 2021-10-02 RX ADMIN — SODIUM CHLORIDE, PRESERVATIVE FREE 10 ML: 5 INJECTION INTRAVENOUS at 06:09

## 2021-10-02 RX ADMIN — IPRATROPIUM BROMIDE AND ALBUTEROL SULFATE 1 AMPULE: 2.5; .5 SOLUTION RESPIRATORY (INHALATION) at 21:50

## 2021-10-02 RX ADMIN — CALCIUM GLUCONATE: 98 INJECTION, SOLUTION INTRAVENOUS at 18:31

## 2021-10-02 RX ADMIN — Medication 10 ML: at 20:46

## 2021-10-02 RX ADMIN — PANTOPRAZOLE SODIUM 40 MG: 40 INJECTION, POWDER, FOR SOLUTION INTRAVENOUS at 16:57

## 2021-10-02 RX ADMIN — BENZOCAINE, BUTAMBEN, AND TETRACAINE HYDROCHLORIDE 1 SPRAY: .028; .004; .004 AEROSOL, SPRAY TOPICAL at 06:09

## 2021-10-02 RX ADMIN — IPRATROPIUM BROMIDE AND ALBUTEROL SULFATE 1 AMPULE: 2.5; .5 SOLUTION RESPIRATORY (INHALATION) at 12:56

## 2021-10-02 RX ADMIN — DIPHENHYDRAMINE HYDROCHLORIDE 25 MG: 50 INJECTION, SOLUTION INTRAMUSCULAR; INTRAVENOUS at 20:46

## 2021-10-02 RX ADMIN — IPRATROPIUM BROMIDE AND ALBUTEROL SULFATE 1 AMPULE: 2.5; .5 SOLUTION RESPIRATORY (INHALATION) at 08:12

## 2021-10-02 RX ADMIN — DIPHENHYDRAMINE HYDROCHLORIDE 25 MG: 50 INJECTION, SOLUTION INTRAMUSCULAR; INTRAVENOUS at 03:08

## 2021-10-02 RX ADMIN — PANTOPRAZOLE SODIUM 40 MG: 40 INJECTION, POWDER, FOR SOLUTION INTRAVENOUS at 06:09

## 2021-10-02 RX ADMIN — BENZOCAINE, BUTAMBEN, AND TETRACAINE HYDROCHLORIDE 1 SPRAY: .028; .004; .004 AEROSOL, SPRAY TOPICAL at 03:08

## 2021-10-02 RX ADMIN — SODIUM CHLORIDE, PRESERVATIVE FREE 10 ML: 5 INJECTION INTRAVENOUS at 16:57

## 2021-10-02 RX ADMIN — IPRATROPIUM BROMIDE AND ALBUTEROL SULFATE 1 AMPULE: 2.5; .5 SOLUTION RESPIRATORY (INHALATION) at 16:58

## 2021-10-02 RX ADMIN — BENZOCAINE, BUTAMBEN, AND TETRACAINE HYDROCHLORIDE 1 SPRAY: .028; .004; .004 AEROSOL, SPRAY TOPICAL at 18:57

## 2021-10-02 RX ADMIN — BENZOCAINE, BUTAMBEN, AND TETRACAINE HYDROCHLORIDE 1 SPRAY: .028; .004; .004 AEROSOL, SPRAY TOPICAL at 08:54

## 2021-10-02 RX ADMIN — SODIUM CHLORIDE, PRESERVATIVE FREE 10 ML: 5 INJECTION INTRAVENOUS at 16:00

## 2021-10-02 ASSESSMENT — PAIN SCALES - GENERAL: PAINLEVEL_OUTOF10: 0

## 2021-10-02 NOTE — PROGRESS NOTES
Hepatobiliary and Pancreatic SURGERY  DAILY PROGRESS NOTE  10/2/2021  CC: abdominal pain    Subjective:  Improved symptoms overnight. PICC just placed and is to start TPN today. Objective:  BP (!) 145/64   Pulse 92   Temp 97.5 °F (36.4 °C) (Oral)   Resp 18   Ht 5' 6\" (1.676 m)   Wt 177 lb (80.3 kg)   SpO2 94%   BMI 28.57 kg/m²     General appearance: alert, cooperative and in no acute distress. Eyes: grossly normal  Lungs: nonlabored breathing on 6 L O2 nasal cannula  Heart: regular rate, normotensive  Abdomen: soft, non-tender, non distended, incisions C/D/I  Skin: No skin abnormalities  Neurologic: Alert and oriented x 3. Grossly normal  Musculoskeletal: No clubbing cyanosis or edema    Assessment/Plan:  67 y.o. female with delayed gastric emptying s/p Whipple procedure 9/9/21 at the [facility]. Still recommend transfer to [facility] to see her surgeon Dr. Jess Ventura for further evaluation of emesis and delayed gastric emptying. She does not want to return at this time because she feels her problem is only respiratory, but would go back if she needed surgery. TPN  Further care per primary.     Electronically signed by Charmaine Lewis MD on 10/2/2021 at 1:10 PM

## 2021-10-03 LAB
ANION GAP SERPL CALCULATED.3IONS-SCNC: 8 MMOL/L (ref 7–16)
BUN BLDV-MCNC: 18 MG/DL (ref 6–23)
CALCIUM SERPL-MCNC: 8.4 MG/DL (ref 8.6–10.2)
CHLORIDE BLD-SCNC: 99 MMOL/L (ref 98–107)
CO2: 32 MMOL/L (ref 22–29)
CREAT SERPL-MCNC: 0.6 MG/DL (ref 0.5–1)
GFR AFRICAN AMERICAN: >60
GFR NON-AFRICAN AMERICAN: >60 ML/MIN/1.73
GLUCOSE BLD-MCNC: 200 MG/DL (ref 74–99)
MAGNESIUM: 2.2 MG/DL (ref 1.6–2.6)
METER GLUCOSE: 185 MG/DL (ref 74–99)
METER GLUCOSE: 236 MG/DL (ref 74–99)
METER GLUCOSE: 281 MG/DL (ref 74–99)
PHOSPHORUS: 3 MG/DL (ref 2.5–4.5)
POTASSIUM SERPL-SCNC: 4.1 MMOL/L (ref 3.5–5)
SODIUM BLD-SCNC: 139 MMOL/L (ref 132–146)

## 2021-10-03 PROCEDURE — 6360000002 HC RX W HCPCS: Performed by: TRANSPLANT SURGERY

## 2021-10-03 PROCEDURE — 6370000000 HC RX 637 (ALT 250 FOR IP): Performed by: SURGERY

## 2021-10-03 PROCEDURE — 6370000000 HC RX 637 (ALT 250 FOR IP): Performed by: PHYSICIAN ASSISTANT

## 2021-10-03 PROCEDURE — 99232 SBSQ HOSP IP/OBS MODERATE 35: CPT | Performed by: SURGERY

## 2021-10-03 PROCEDURE — 2580000003 HC RX 258: Performed by: STUDENT IN AN ORGANIZED HEALTH CARE EDUCATION/TRAINING PROGRAM

## 2021-10-03 PROCEDURE — 82962 GLUCOSE BLOOD TEST: CPT

## 2021-10-03 PROCEDURE — 2700000000 HC OXYGEN THERAPY PER DAY

## 2021-10-03 PROCEDURE — C9113 INJ PANTOPRAZOLE SODIUM, VIA: HCPCS | Performed by: STUDENT IN AN ORGANIZED HEALTH CARE EDUCATION/TRAINING PROGRAM

## 2021-10-03 PROCEDURE — 83735 ASSAY OF MAGNESIUM: CPT

## 2021-10-03 PROCEDURE — 2500000003 HC RX 250 WO HCPCS: Performed by: TRANSPLANT SURGERY

## 2021-10-03 PROCEDURE — 1200000000 HC SEMI PRIVATE

## 2021-10-03 PROCEDURE — 80048 BASIC METABOLIC PNL TOTAL CA: CPT

## 2021-10-03 PROCEDURE — 84100 ASSAY OF PHOSPHORUS: CPT

## 2021-10-03 PROCEDURE — 6360000002 HC RX W HCPCS: Performed by: STUDENT IN AN ORGANIZED HEALTH CARE EDUCATION/TRAINING PROGRAM

## 2021-10-03 PROCEDURE — 94640 AIRWAY INHALATION TREATMENT: CPT

## 2021-10-03 PROCEDURE — 36415 COLL VENOUS BLD VENIPUNCTURE: CPT

## 2021-10-03 RX ADMIN — BENZOCAINE, BUTAMBEN, AND TETRACAINE HYDROCHLORIDE 1 SPRAY: .028; .004; .004 AEROSOL, SPRAY TOPICAL at 07:18

## 2021-10-03 RX ADMIN — IPRATROPIUM BROMIDE AND ALBUTEROL SULFATE 1 AMPULE: 2.5; .5 SOLUTION RESPIRATORY (INHALATION) at 08:08

## 2021-10-03 RX ADMIN — DIPHENHYDRAMINE HYDROCHLORIDE 25 MG: 50 INJECTION, SOLUTION INTRAMUSCULAR; INTRAVENOUS at 18:18

## 2021-10-03 RX ADMIN — SODIUM CHLORIDE, PRESERVATIVE FREE 10 ML: 5 INJECTION INTRAVENOUS at 06:12

## 2021-10-03 RX ADMIN — BENZOCAINE, BUTAMBEN, AND TETRACAINE HYDROCHLORIDE 1 SPRAY: .028; .004; .004 AEROSOL, SPRAY TOPICAL at 11:27

## 2021-10-03 RX ADMIN — PANTOPRAZOLE SODIUM 40 MG: 40 INJECTION, POWDER, FOR SOLUTION INTRAVENOUS at 06:12

## 2021-10-03 RX ADMIN — MAGNESIUM HYDROXIDE 30 ML: 400 SUSPENSION ORAL at 09:55

## 2021-10-03 RX ADMIN — IPRATROPIUM BROMIDE AND ALBUTEROL SULFATE 1 AMPULE: 2.5; .5 SOLUTION RESPIRATORY (INHALATION) at 16:31

## 2021-10-03 RX ADMIN — IPRATROPIUM BROMIDE AND ALBUTEROL SULFATE 1 AMPULE: 2.5; .5 SOLUTION RESPIRATORY (INHALATION) at 19:31

## 2021-10-03 RX ADMIN — IPRATROPIUM BROMIDE AND ALBUTEROL SULFATE 1 AMPULE: 2.5; .5 SOLUTION RESPIRATORY (INHALATION) at 13:37

## 2021-10-03 RX ADMIN — CALCIUM GLUCONATE: 98 INJECTION, SOLUTION INTRAVENOUS at 19:16

## 2021-10-03 ASSESSMENT — PAIN SCALES - GENERAL
PAINLEVEL_OUTOF10: 0
PAINLEVEL_OUTOF10: 0

## 2021-10-03 NOTE — PROGRESS NOTES
Subjective: The patient is awake and alert. Patient feels a lot better, spoke with daughter at bedside. No acute events overnight. Denies chest pain, angina, SOB       Objective:    BP (!) 142/64   Pulse 94   Temp 98.1 °F (36.7 °C) (Oral)   Resp 18   Ht 5' 6\" (1.676 m)   Wt 179 lb 9.6 oz (81.5 kg)   SpO2 95%   BMI 28.99 kg/m²     In: 510 [I.V.:10]  Out: 1300   In: 510   Out: 1300 [Urine:1200]    General appearance: NAD, conversant  HEENT: AT/NC, MMM  Neck: FROM, supple  Lungs: diminished  CV: RRR, no MRGs  Vasc: Radial pulses 2+  Abdomen: Soft, non-tender; no masses or HSM, hypoactive BS, disteneded  Extremities: No peripheral edema or digital cyanosis  Skin: no rash, lesions or ulcers  Psych: Alert and oriented to person, place and time  Neuro: Alert and interactive     Recent Labs     10/01/21  0423 10/02/21  0350   WBC 5.7 5.9   HGB 9.1* 9.3*   HCT 29.5* 29.8*    388       Recent Labs     10/01/21  0423 10/02/21  0350 10/03/21  0426    135 139   K 3.9 3.6 4.1   CL 97* 98 99   CO2 30* 29 32*   BUN 25* 21 18   CREATININE 0.6 0.6 0.6   CALCIUM 8.3* 8.3* 8.4*       Assessment:    Principal Problem:    Hypoxia  Active Problems:    Pleural effusion  Resolved Problems:    * No resolved hospital problems. *      Plan:    Patient is a 67year old female with a PMH of depression, GERD and hypertension who presents to the emergency room for SOB and nausea and vomiting.     SOB d/t pleural effusions   - CTA Pulm: Left and right pleural effusions with the left larger than the right as well as bilateral lower lobe atelectasis. -Monitor labs - WBC   -procal, sed rate, CRP, blood cultures pending  -Duonebs  -Incentive spirometry   -5L NC o2 - not on any at home. >> will have nursing start to wean. -Monitor O2 saturations and supplement oxygen to keep saturations greater than 93%, wean as necessary  -Left thoracentesis 10/1/21 400cc off  -D-Dimer 4,356 -?  Concern for pe , initial cta negative , reconsider repeat CTA if worsening pulm status   -US bilateral lowers- Echogenic thrombus identified in the left peroneal vein. - will discuss w pulm re initiation of oac     Post-op Whipple procedure  -General surgery following  -XR Abdomen- negative   -NGT to LIS   -TPN per general surgery  -PICC for TPN done 10/2   -Discussed with dr. Ibrahima Khalil     DVT Prophylaxis - lovenox   PT/OT      NAMRATA Forbes CNP  12:10 PM  10/3/2021     Discharge planning- accepted to [facility] / bed made available - but family declined transfer   I discussed with daughter why recommendation ws made for tranfer back to original facility     Above note edited to reflect my thoughts     I personally saw, examined and provided care for the patient. Radiographs, labs and medication list were reviewed by me independently. The case was discussed in detail and plans for care were established. Review of GITA Forbes   , documentation was conducted and revisions were made as appropriate directly by me. I agree with the above documented exam, problem list, and plan of care.      Óscar Crisostmoo MD  6:08 PM  10/3/2021

## 2021-10-03 NOTE — PROGRESS NOTES
Access center called, states patient had bed placement at [facility] [place]. Call placed to [facility], daughter, to update. [facility] states she does not want mother transferred at this time. Access center updated at this time.     Electronically signed by Tulio Cheng RN on 10/2/2021 at 9:43 PM

## 2021-10-03 NOTE — PROGRESS NOTES
Hepatobiliary and Pancreatic SURGERY  DAILY PROGRESS NOTE  10/3/2021  CC: abdominal pain    Subjective:  Improved symptoms overnight. PICC just placed and is to start TPN today. Objective:  BP (!) 142/64   Pulse 94   Temp 98.1 °F (36.7 °C) (Oral)   Resp 18   Ht 5' 6\" (1.676 m)   Wt 179 lb 9.6 oz (81.5 kg)   SpO2 95%   BMI 28.99 kg/m²     General appearance: alert, cooperative and in no acute distress. Eyes: grossly normal  Lungs: nonlabored breathing on 6 L O2 nasal cannula  Heart: regular rate, normotensive  Abdomen: soft, non-tender, non distended, incisions C/D/I  Skin: No skin abnormalities  Neurologic: Alert and oriented x 3. Grossly normal  Musculoskeletal: No clubbing cyanosis or edema    Assessment/Plan:  67 y.o. female with delayed gastric emptying s/p Whipple procedure 9/9/21 at the [facility]. Still recommend transfer to [facility] to see her surgeon Dr. Lidia Gabriel for further evaluation of emesis and delayed gastric emptying. She does not want to return at this time because she feels her problem is only respiratory, but would go back if she needed surgery. TPN  Further care per primary.     Electronically signed by Larry Granger MD on 10/3/2021 at 12:35 PM

## 2021-10-04 ENCOUNTER — APPOINTMENT (OUTPATIENT)
Dept: GENERAL RADIOLOGY | Age: 76
DRG: 186 | End: 2021-10-04
Payer: MEDICARE

## 2021-10-04 LAB
ANION GAP SERPL CALCULATED.3IONS-SCNC: 9 MMOL/L (ref 7–16)
BASOPHILS ABSOLUTE: 0.03 E9/L (ref 0–0.2)
BASOPHILS RELATIVE PERCENT: 0.7 % (ref 0–2)
BODY FLUID CULTURE, STERILE: NORMAL
BUN BLDV-MCNC: 18 MG/DL (ref 6–23)
CALCIUM SERPL-MCNC: 8.7 MG/DL (ref 8.6–10.2)
CHLORIDE BLD-SCNC: 101 MMOL/L (ref 98–107)
CO2: 30 MMOL/L (ref 22–29)
CREAT SERPL-MCNC: 0.5 MG/DL (ref 0.5–1)
EOSINOPHILS ABSOLUTE: 0.1 E9/L (ref 0.05–0.5)
EOSINOPHILS RELATIVE PERCENT: 2.4 % (ref 0–6)
GFR AFRICAN AMERICAN: >60
GFR NON-AFRICAN AMERICAN: >60 ML/MIN/1.73
GLUCOSE BLD-MCNC: 198 MG/DL (ref 74–99)
GRAM STAIN RESULT: NORMAL
HCT VFR BLD CALC: 31 % (ref 34–48)
HEMOGLOBIN: 9.7 G/DL (ref 11.5–15.5)
IMMATURE GRANULOCYTES #: 0.01 E9/L
IMMATURE GRANULOCYTES %: 0.2 % (ref 0–5)
LYMPHOCYTES ABSOLUTE: 0.82 E9/L (ref 1.5–4)
LYMPHOCYTES RELATIVE PERCENT: 20 % (ref 20–42)
MAGNESIUM: 2.3 MG/DL (ref 1.6–2.6)
MCH RBC QN AUTO: 28 PG (ref 26–35)
MCHC RBC AUTO-ENTMCNC: 31.3 % (ref 32–34.5)
MCV RBC AUTO: 89.3 FL (ref 80–99.9)
METER GLUCOSE: 188 MG/DL (ref 74–99)
METER GLUCOSE: 194 MG/DL (ref 74–99)
METER GLUCOSE: 211 MG/DL (ref 74–99)
METER GLUCOSE: 249 MG/DL (ref 74–99)
MONOCYTES ABSOLUTE: 0.37 E9/L (ref 0.1–0.95)
MONOCYTES RELATIVE PERCENT: 9 % (ref 2–12)
NEUTROPHILS ABSOLUTE: 2.76 E9/L (ref 1.8–7.3)
NEUTROPHILS RELATIVE PERCENT: 67.7 % (ref 43–80)
PDW BLD-RTO: 13.8 FL (ref 11.5–15)
PHOSPHORUS: 3 MG/DL (ref 2.5–4.5)
PLATELET # BLD: 335 E9/L (ref 130–450)
PMV BLD AUTO: 10.3 FL (ref 7–12)
POTASSIUM SERPL-SCNC: 3.9 MMOL/L (ref 3.5–5)
RBC # BLD: 3.47 E12/L (ref 3.5–5.5)
SODIUM BLD-SCNC: 140 MMOL/L (ref 132–146)
WBC # BLD: 4.1 E9/L (ref 4.5–11.5)

## 2021-10-04 PROCEDURE — 74250 X-RAY XM SM INT 1CNTRST STD: CPT

## 2021-10-04 PROCEDURE — 6360000002 HC RX W HCPCS: Performed by: STUDENT IN AN ORGANIZED HEALTH CARE EDUCATION/TRAINING PROGRAM

## 2021-10-04 PROCEDURE — 80048 BASIC METABOLIC PNL TOTAL CA: CPT

## 2021-10-04 PROCEDURE — 36592 COLLECT BLOOD FROM PICC: CPT

## 2021-10-04 PROCEDURE — 36415 COLL VENOUS BLD VENIPUNCTURE: CPT

## 2021-10-04 PROCEDURE — 2700000000 HC OXYGEN THERAPY PER DAY

## 2021-10-04 PROCEDURE — 84100 ASSAY OF PHOSPHORUS: CPT

## 2021-10-04 PROCEDURE — 6360000002 HC RX W HCPCS: Performed by: TRANSPLANT SURGERY

## 2021-10-04 PROCEDURE — 6370000000 HC RX 637 (ALT 250 FOR IP): Performed by: SURGERY

## 2021-10-04 PROCEDURE — 6370000000 HC RX 637 (ALT 250 FOR IP): Performed by: PHYSICIAN ASSISTANT

## 2021-10-04 PROCEDURE — 2580000003 HC RX 258: Performed by: PHYSICIAN ASSISTANT

## 2021-10-04 PROCEDURE — 2500000003 HC RX 250 WO HCPCS

## 2021-10-04 PROCEDURE — 2580000003 HC RX 258: Performed by: STUDENT IN AN ORGANIZED HEALTH CARE EDUCATION/TRAINING PROGRAM

## 2021-10-04 PROCEDURE — 1200000000 HC SEMI PRIVATE

## 2021-10-04 PROCEDURE — 97530 THERAPEUTIC ACTIVITIES: CPT

## 2021-10-04 PROCEDURE — 6360000002 HC RX W HCPCS: Performed by: INTERNAL MEDICINE

## 2021-10-04 PROCEDURE — 97161 PT EVAL LOW COMPLEX 20 MIN: CPT

## 2021-10-04 PROCEDURE — C9113 INJ PANTOPRAZOLE SODIUM, VIA: HCPCS | Performed by: STUDENT IN AN ORGANIZED HEALTH CARE EDUCATION/TRAINING PROGRAM

## 2021-10-04 PROCEDURE — 74018 RADEX ABDOMEN 1 VIEW: CPT

## 2021-10-04 PROCEDURE — 82962 GLUCOSE BLOOD TEST: CPT

## 2021-10-04 PROCEDURE — 94640 AIRWAY INHALATION TREATMENT: CPT

## 2021-10-04 PROCEDURE — 6360000004 HC RX CONTRAST MEDICATION: Performed by: RADIOLOGY

## 2021-10-04 PROCEDURE — 6370000000 HC RX 637 (ALT 250 FOR IP): Performed by: INTERNAL MEDICINE

## 2021-10-04 PROCEDURE — 83735 ASSAY OF MAGNESIUM: CPT

## 2021-10-04 PROCEDURE — 99232 SBSQ HOSP IP/OBS MODERATE 35: CPT | Performed by: TRANSPLANT SURGERY

## 2021-10-04 PROCEDURE — 85025 COMPLETE CBC W/AUTO DIFF WBC: CPT

## 2021-10-04 RX ORDER — METOCLOPRAMIDE HYDROCHLORIDE 5 MG/ML
5 INJECTION INTRAMUSCULAR; INTRAVENOUS EVERY 6 HOURS
Status: DISCONTINUED | OUTPATIENT
Start: 2021-10-04 | End: 2021-10-07

## 2021-10-04 RX ADMIN — METOCLOPRAMIDE HYDROCHLORIDE 5 MG: 5 INJECTION INTRAMUSCULAR; INTRAVENOUS at 15:51

## 2021-10-04 RX ADMIN — SERTRALINE 25 MG: 50 TABLET, FILM COATED ORAL at 20:59

## 2021-10-04 RX ADMIN — Medication 3 MG: at 20:59

## 2021-10-04 RX ADMIN — METOCLOPRAMIDE HYDROCHLORIDE 5 MG: 5 INJECTION INTRAMUSCULAR; INTRAVENOUS at 21:00

## 2021-10-04 RX ADMIN — IPRATROPIUM BROMIDE AND ALBUTEROL SULFATE 1 AMPULE: 2.5; .5 SOLUTION RESPIRATORY (INHALATION) at 09:59

## 2021-10-04 RX ADMIN — SODIUM CHLORIDE, PRESERVATIVE FREE 10 ML: 5 INJECTION INTRAVENOUS at 06:10

## 2021-10-04 RX ADMIN — Medication 10 ML: at 20:54

## 2021-10-04 RX ADMIN — BENZOCAINE, BUTAMBEN, AND TETRACAINE HYDROCHLORIDE 1 SPRAY: .028; .004; .004 AEROSOL, SPRAY TOPICAL at 06:10

## 2021-10-04 RX ADMIN — MAGNESIUM HYDROXIDE 30 ML: 400 SUSPENSION ORAL at 09:13

## 2021-10-04 RX ADMIN — PANTOPRAZOLE SODIUM 40 MG: 40 INJECTION, POWDER, FOR SOLUTION INTRAVENOUS at 06:10

## 2021-10-04 RX ADMIN — SODIUM CHLORIDE, PRESERVATIVE FREE 10 ML: 5 INJECTION INTRAVENOUS at 15:51

## 2021-10-04 RX ADMIN — POTASSIUM CHLORIDE: 2 INJECTION, SOLUTION, CONCENTRATE INTRAVENOUS at 18:04

## 2021-10-04 RX ADMIN — PANTOPRAZOLE SODIUM 40 MG: 40 INJECTION, POWDER, FOR SOLUTION INTRAVENOUS at 15:51

## 2021-10-04 RX ADMIN — ENOXAPARIN SODIUM 40 MG: 40 INJECTION SUBCUTANEOUS at 09:07

## 2021-10-04 RX ADMIN — APIXABAN 5 MG: 5 TABLET, FILM COATED ORAL at 20:59

## 2021-10-04 RX ADMIN — DIATRIZOATE MEGLUMINE AND DIATRIZOATE SODIUM 120 ML: 660; 100 LIQUID ORAL; RECTAL at 12:59

## 2021-10-04 RX ADMIN — IPRATROPIUM BROMIDE AND ALBUTEROL SULFATE 1 AMPULE: 2.5; .5 SOLUTION RESPIRATORY (INHALATION) at 20:56

## 2021-10-04 RX ADMIN — SODIUM CHLORIDE, PRESERVATIVE FREE 10 ML: 5 INJECTION INTRAVENOUS at 00:17

## 2021-10-04 RX ADMIN — DIPHENHYDRAMINE HYDROCHLORIDE 25 MG: 50 INJECTION, SOLUTION INTRAMUSCULAR; INTRAVENOUS at 00:17

## 2021-10-04 ASSESSMENT — PAIN SCALES - GENERAL
PAINLEVEL_OUTOF10: 0

## 2021-10-04 NOTE — PROGRESS NOTES
Subjective: The patient is awake and alert. No acute events overnight. Denies chest pain, angina, SOB       Objective:    /67   Pulse 104   Temp 98.6 °F (37 °C) (Axillary)   Resp 16   Ht 5' 6\" (1.676 m)   Wt 185 lb 3.2 oz (84 kg)   SpO2 93%   BMI 29.89 kg/m²     In: 118 [P.O.:118]  Out: 2725   In: 118   Out: 2725 [Urine:925]    General appearance: NAD, conversant  HEENT: AT/NC, MMM  Neck: FROM, supple  Lungs: diminished  CV: RRR, no MRGs  Vasc: Radial pulses 2+  Abdomen: Soft, non-tender; no masses or HSM, hypoactive BS, disteneded  Extremities: No peripheral edema or digital cyanosis  Skin: no rash, lesions or ulcers  Psych: Alert and oriented to person, place and time  Neuro: Alert and interactive     Recent Labs     10/02/21  0350 10/04/21  0610   WBC 5.9 4.1*   HGB 9.3* 9.7*   HCT 29.8* 31.0*    335       Recent Labs     10/02/21  0350 10/03/21  0426 10/04/21  0610    139 140   K 3.6 4.1 3.9   CL 98 99 101   CO2 29 32* 30*   BUN 21 18 18   CREATININE 0.6 0.6 0.5   CALCIUM 8.3* 8.4* 8.7       Assessment:    Principal Problem:    Hypoxia  Active Problems:    Pleural effusion    Delayed gastric emptying    IPMN (intraductal papillary mucinous neoplasm)  Resolved Problems:    * No resolved hospital problems. *      Plan:    Patient is a 67year old female with a PMH of depression, GERD and hypertension who presents to the emergency room for SOB and nausea and vomiting.     SOB d/t pleural effusions   - CTA Pulm: Left and right pleural effusions with the left larger than the right as well as bilateral lower lobe atelectasis. -Monitor labs - WBC   -procal, sed rate, CRP, blood cultures pending  -Duonebs  -Incentive spirometry   -1L NC o2 - not on any at home, previously on 5L  -Monitor O2 saturations and supplement oxygen to keep saturations greater than 93%, wean as necessary  -Left thoracentesis 10/1/21 400cc off  -D-Dimer 7,358 -?  Concern for pe , initial cta negative , reconsider repeat CTA if worsening pulm status   -US bilateral lowers- Echogenic thrombus identified in the left peroneal vein    Post-op Whipple procedure  -General surgery following  -XR Abdomen- negative   -NGT to LIS   -TPN per general surgery  -PICC for TPN done 10/2   -SBFT today      DVT Prophylaxis - lovenox   PT/OT  Discharge planning    NAMRATA Vicente CNP  1:43 PM  10/4/2021     Above note edited to reflect my thoughts  I will discuss with Dr. Maria Victoria Prince  regarding initiation of oral anticoagulation for lower extremity DVT 20 sure no surgery is planned in the near future  H&H has remained stable since admission     I personally saw, examined and provided care for the patient. Radiographs, labs and medication list were reviewed by me independently. The case was discussed in detail and plans for care were established. Review of NAMRATA Vicente CNP, documentation was conducted and revisions were made as appropriate directly by me. I agree with the above documented exam, problem list, and plan of care.      Viet Ureña MD  4:32 PM  10/4/2021

## 2021-10-04 NOTE — PROGRESS NOTES
Physical Therapy    Facility/Department: [facility] MED SURG  Initial Assessment    NAME: Paula Thorne  : 1945  MRN: [id number]    Date of Service: 10/4/2021      Attending Provider:  Stacy Agee MD    Evaluating PT:  Mery Christian. Jessica Grewal, P.T. Room #:  2569/0574-  Diagnosis:  Hypoxia [R09.02]  Pleural effusion, bilateral [J90]  Pertinent PMHx/PSHx:  Whipple procedure 21 at the [facility]  Procedure/Surgery:  Thoracentesis 10/1/21  Precautions:  Falls, bed/chair alarm when someone not with pt, NG tube  Equipment Needs:  Wheeled walker    SUBJECTIVE:    Pt lives alone in a 1 story home with 3 stairs and 1 rail to enter. Pt ambulated with no AD PTA. OBJECTIVE:   Initial Evaluation  Date: 10/4/21 Treatment Short Term/ Long Term   Goals   Was pt agreeable to Eval/treatment? yes     Does pt have pain? No c/o pain     Bed Mobility  Rolling: MIN A  Supine to sit: MIN A  Sit to supine: NA  Scooting: MIN A  Independent    Transfers Sit to stand: MIN A/MOD A  Stand to sit: MIN A  Stand pivot: MIN A with ww  Independent    Ambulation   15+40 feet with ww MIN A  200 feet with ww if needed Independent    Stair negotiation: ascended and descended NA, pt c/o fatigue with amb  3 steps with 1 rail Independent    AM-PAC 6 Clicks        BLE ROM is WFL. BLE strength is grossly 3-/5 to 4/5. Sensation:  Pt denies numbness and tingling to extremities  Edema:  None noted  Balance: sitting is supervision and standing with ww is MIN A  Endurance: fair    Patient education  Pt educated on gait with ww and hand placement during transfers.      Patient response to education:   Pt verbalized understanding Pt demonstrated skill Pt requires further education in this area   yes yes yes     ASSESSMENT:    Conditions Requiring Skilled Therapeutic Intervention:    [x]Decreased strength     []Decreased ROM  [x]Decreased functional mobility  [x]Decreased balance   [x]Decreased endurance   []Decreased posture  []Decreased sensation  []Decreased coordination   []Decreased vision  []Decreased safety awareness   []Increased pain     Comments:  Pt was in bed and agreeable to PT. She was disconnected from wall suction prior to OOB activity and then reconnected to wall suction after PT session. She sat on EOB and asked to use BR. She stood up from bed with MOD A and walked with ww into BR with MIN A. Pt transferred on/off commode with MIN A using grab bar. She stood at sink with MIN A for balance while she washed and dried her hands. Pt walked with ww in the room and c/o fatigue that limited amb distance. Treatment:  Patient practiced and was instructed in the following treatment:     Bed mobility, transfers, ADLs, and gait with ww to improve functional strength, balance, and endurance. Pt was left sitting up in chair with call light left by patient and visitor present with pt.    Pt's/ family goals   1. To go home. Patient and or family understand(s) diagnosis, prognosis, and plan of care. PHYSICAL THERAPY PLAN OF CARE:    PT POC is established based on physician order and patient diagnosis     Referring provider/PT Order:  PT eval and treat  Diagnosis:  Hypoxia [R09.02]  Pleural effusion, bilateral [J90]  Specific instructions for next treatment:  Increase amb as pt is able.     Current Treatment Recommendations:     [x] Strengthening to improve independence with functional mobility   [] ROM to improve ROM and decrease spasm and pain which will help promote independence with functional mobility   [x] Balance Training to improve static/dynamic balance and to reduce fall risk  [x] Endurance Training to improve activity tolerance during functional mobility   [x] Transfer Training to improve safety and independence with all functional transfers   [x] Gait Training to improve gait mechanics, endurance and assess need for appropriate assistive device  [x] Stair Training in preparation for safe discharge home and/or into the community   [] Positioning to prevent skin breakdown and contractures  [] Safety and Education Training   [x] Patient/Caregiver Education   [] HEP  [] Other     PT long term treatment goals are located in above grid    Frequency of treatments: 2-5x/week x 1-2 weeks. Time in  08:20  Time out  08:45    Total Treatment Time  10 minutes     Evaluation Time includes thorough review of current medical information, gathering information on past medical history/social history and prior level of function, completion of standardized testing/informal observation of tasks, assessment of data and education on plan of care and goals. CPT codes:  [x] Low Complexity PT evaluation 46297  [] Moderate Complexity PT evaluation 84920  [] High Complexity PT evaluation 38399  [] PT Re-evaluation 79739  [] Gait training 25058 ** minutes  [] Manual therapy 33847 ** minutes  [x] Therapeutic activities 83719 10 minutes  [] Therapeutic exercises 89708 ** minutes  [] Neuromuscular reeducation 08908 ** minutes     Ajith Stevenson Mai., P.T.   License Number: [id number]

## 2021-10-04 NOTE — PROGRESS NOTES
[facility] SURGERY  DAILY PROGRESS NOTE  10/4/2021    CC: nausea    Subjective:  No pain this morning, no nausea and vomiting. No BM or flatus since last Monday. Objective:  BP (!) 158/68   Pulse 98   Temp 97.8 °F (36.6 °C) (Oral)   Resp 18   Ht 5' 6\" (1.676 m)   Wt 185 lb 3.2 oz (84 kg)   SpO2 99%   BMI 29.89 kg/m²     GENERAL:  No acute distress. Alert and interactive. Oriented x3. LUNGS:  No cough. Nonlabored breathing on 3L NC   CARDIOVASC:  Normal rate, no cyanosis. ABDOMEN:  Soft, non distended, non tender. No guarding / rigidity / rebound. NG to LIS. Incisions CDIT with minimal bruising surrounding incision  EXTREMITIES: Moves all extremities. No swelling or edema.      Assessment/Plan:  67 y.o. 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  - still recommend patient return to [facility] under Dr. Vincent Lopez service as he was the operating surgeon to deal with her DGE  - daughter apparently refused transfer   - small bowel follow through 10/4 - if no transit, then will need TPN and placement  - pain and nausea control prn  - continue NG tube to LIS  - NPO on TPN    Electronically signed by Zhang Hicks MD on 10/4/2021 at 10:11 AM

## 2021-10-04 NOTE — PATIENT CARE CONFERENCE
[facility] Quality Flow/Interdisciplinary Rounds Progress Note        Quality Flow Rounds held on October 4, 2021    Disciplines Attending:  Bedside Nurse, ,  and Nursing Unit Leadership    Yoko Aguila was admitted on 9/29/2021 11:13 PM    Anticipated Discharge Date:  Expected Discharge Date: 10/03/21    Disposition:    Darren Score:  Darren Scale Score: 19    Readmission Risk              Risk of Unplanned Readmission:  13           Discussed patient goal for the day, patient clinical progression, and barriers to discharge.   The following Goal(s) of the Day/Commitment(s) have been identified:  Labs - Report Results      Leilani Brown RN  October 4, 2021

## 2021-10-04 NOTE — PROGRESS NOTES
Occupational Therapy  Date:10/4/2021  Patient Name: Kacey Carvajal  Room: 4794/5601-H     Occupational Therapy (OT) order received, patient's medical record reviewed, and OT evaluation attempted this date; patient unavailable due to being off of unit. OT evaluation to be re-attempted at later date, as able/appropriate. Lashell Abreu OTKASI/SHEN  License Number: [id number]

## 2021-10-05 LAB
ALBUMIN SERPL-MCNC: 3.1 G/DL (ref 3.5–5.2)
ALP BLD-CCNC: 123 U/L (ref 35–104)
ALT SERPL-CCNC: 26 U/L (ref 0–32)
ANION GAP SERPL CALCULATED.3IONS-SCNC: 12 MMOL/L (ref 7–16)
AST SERPL-CCNC: 22 U/L (ref 0–31)
BASOPHILS ABSOLUTE: 0.03 E9/L (ref 0–0.2)
BASOPHILS RELATIVE PERCENT: 0.6 % (ref 0–2)
BILIRUB SERPL-MCNC: 0.3 MG/DL (ref 0–1.2)
BUN BLDV-MCNC: 19 MG/DL (ref 6–23)
CALCIUM SERPL-MCNC: 8.9 MG/DL (ref 8.6–10.2)
CHLORIDE BLD-SCNC: 105 MMOL/L (ref 98–107)
CO2: 29 MMOL/L (ref 22–29)
CREAT SERPL-MCNC: 0.5 MG/DL (ref 0.5–1)
EOSINOPHILS ABSOLUTE: 0.05 E9/L (ref 0.05–0.5)
EOSINOPHILS RELATIVE PERCENT: 1 % (ref 0–6)
GFR AFRICAN AMERICAN: >60
GFR NON-AFRICAN AMERICAN: >60 ML/MIN/1.73
GLUCOSE BLD-MCNC: 243 MG/DL (ref 74–99)
HCT VFR BLD CALC: 32.7 % (ref 34–48)
HEMOGLOBIN: 9.9 G/DL (ref 11.5–15.5)
IMMATURE GRANULOCYTES #: 0.02 E9/L
IMMATURE GRANULOCYTES %: 0.4 % (ref 0–5)
LYMPHOCYTES ABSOLUTE: 1.11 E9/L (ref 1.5–4)
LYMPHOCYTES RELATIVE PERCENT: 22.7 % (ref 20–42)
MCH RBC QN AUTO: 27.3 PG (ref 26–35)
MCHC RBC AUTO-ENTMCNC: 30.3 % (ref 32–34.5)
MCV RBC AUTO: 90.3 FL (ref 80–99.9)
METER GLUCOSE: 227 MG/DL (ref 74–99)
METER GLUCOSE: 247 MG/DL (ref 74–99)
METER GLUCOSE: 274 MG/DL (ref 74–99)
METER GLUCOSE: 277 MG/DL (ref 74–99)
MONOCYTES ABSOLUTE: 0.49 E9/L (ref 0.1–0.95)
MONOCYTES RELATIVE PERCENT: 10 % (ref 2–12)
NEUTROPHILS ABSOLUTE: 3.18 E9/L (ref 1.8–7.3)
NEUTROPHILS RELATIVE PERCENT: 65.3 % (ref 43–80)
PDW BLD-RTO: 13.6 FL (ref 11.5–15)
PLATELET # BLD: 357 E9/L (ref 130–450)
PMV BLD AUTO: 10.2 FL (ref 7–12)
POTASSIUM SERPL-SCNC: 4.6 MMOL/L (ref 3.5–5)
RBC # BLD: 3.62 E12/L (ref 3.5–5.5)
SODIUM BLD-SCNC: 146 MMOL/L (ref 132–146)
TOTAL PROTEIN: 6.5 G/DL (ref 6.4–8.3)
WBC # BLD: 4.9 E9/L (ref 4.5–11.5)

## 2021-10-05 PROCEDURE — 80053 COMPREHEN METABOLIC PANEL: CPT

## 2021-10-05 PROCEDURE — 1200000000 HC SEMI PRIVATE

## 2021-10-05 PROCEDURE — 6370000000 HC RX 637 (ALT 250 FOR IP)

## 2021-10-05 PROCEDURE — C9113 INJ PANTOPRAZOLE SODIUM, VIA: HCPCS | Performed by: STUDENT IN AN ORGANIZED HEALTH CARE EDUCATION/TRAINING PROGRAM

## 2021-10-05 PROCEDURE — 2500000003 HC RX 250 WO HCPCS

## 2021-10-05 PROCEDURE — 85025 COMPLETE CBC W/AUTO DIFF WBC: CPT

## 2021-10-05 PROCEDURE — 82962 GLUCOSE BLOOD TEST: CPT

## 2021-10-05 PROCEDURE — 6360000002 HC RX W HCPCS: Performed by: STUDENT IN AN ORGANIZED HEALTH CARE EDUCATION/TRAINING PROGRAM

## 2021-10-05 PROCEDURE — 94640 AIRWAY INHALATION TREATMENT: CPT

## 2021-10-05 PROCEDURE — 6370000000 HC RX 637 (ALT 250 FOR IP): Performed by: PHYSICIAN ASSISTANT

## 2021-10-05 PROCEDURE — 6370000000 HC RX 637 (ALT 250 FOR IP): Performed by: INTERNAL MEDICINE

## 2021-10-05 PROCEDURE — 36415 COLL VENOUS BLD VENIPUNCTURE: CPT

## 2021-10-05 PROCEDURE — 6360000002 HC RX W HCPCS: Performed by: TRANSPLANT SURGERY

## 2021-10-05 PROCEDURE — 2580000003 HC RX 258: Performed by: PHYSICIAN ASSISTANT

## 2021-10-05 PROCEDURE — 97165 OT EVAL LOW COMPLEX 30 MIN: CPT

## 2021-10-05 PROCEDURE — 2580000003 HC RX 258: Performed by: STUDENT IN AN ORGANIZED HEALTH CARE EDUCATION/TRAINING PROGRAM

## 2021-10-05 RX ORDER — BISACODYL 10 MG
10 SUPPOSITORY, RECTAL RECTAL DAILY PRN
Status: DISCONTINUED | OUTPATIENT
Start: 2021-10-05 | End: 2021-10-08 | Stop reason: HOSPADM

## 2021-10-05 RX ADMIN — IPRATROPIUM BROMIDE AND ALBUTEROL SULFATE 1 AMPULE: 2.5; .5 SOLUTION RESPIRATORY (INHALATION) at 16:23

## 2021-10-05 RX ADMIN — BISACODYL 10 MG: 10 SUPPOSITORY RECTAL at 16:30

## 2021-10-05 RX ADMIN — METOCLOPRAMIDE HYDROCHLORIDE 5 MG: 5 INJECTION INTRAMUSCULAR; INTRAVENOUS at 08:54

## 2021-10-05 RX ADMIN — PANTOPRAZOLE SODIUM 40 MG: 40 INJECTION, POWDER, FOR SOLUTION INTRAVENOUS at 05:55

## 2021-10-05 RX ADMIN — PANTOPRAZOLE SODIUM 40 MG: 40 INJECTION, POWDER, FOR SOLUTION INTRAVENOUS at 18:34

## 2021-10-05 RX ADMIN — METOCLOPRAMIDE HYDROCHLORIDE 5 MG: 5 INJECTION INTRAMUSCULAR; INTRAVENOUS at 02:28

## 2021-10-05 RX ADMIN — LOSARTAN POTASSIUM 50 MG: 50 TABLET, FILM COATED ORAL at 08:54

## 2021-10-05 RX ADMIN — APIXABAN 5 MG: 5 TABLET, FILM COATED ORAL at 08:54

## 2021-10-05 RX ADMIN — SODIUM CHLORIDE, PRESERVATIVE FREE 10 ML: 5 INJECTION INTRAVENOUS at 18:41

## 2021-10-05 RX ADMIN — INSULIN LISPRO 3 UNITS: 100 INJECTION, SOLUTION INTRAVENOUS; SUBCUTANEOUS at 21:25

## 2021-10-05 RX ADMIN — SODIUM CHLORIDE, PRESERVATIVE FREE 10 ML: 5 INJECTION INTRAVENOUS at 05:55

## 2021-10-05 RX ADMIN — DIPHENHYDRAMINE HYDROCHLORIDE 25 MG: 50 INJECTION, SOLUTION INTRAMUSCULAR; INTRAVENOUS at 21:25

## 2021-10-05 RX ADMIN — Medication 3 MG: at 21:25

## 2021-10-05 RX ADMIN — SERTRALINE 25 MG: 50 TABLET, FILM COATED ORAL at 21:25

## 2021-10-05 RX ADMIN — Medication 10 ML: at 09:00

## 2021-10-05 RX ADMIN — IPRATROPIUM BROMIDE AND ALBUTEROL SULFATE 1 AMPULE: 2.5; .5 SOLUTION RESPIRATORY (INHALATION) at 21:23

## 2021-10-05 RX ADMIN — METOCLOPRAMIDE HYDROCHLORIDE 5 MG: 5 INJECTION INTRAMUSCULAR; INTRAVENOUS at 14:23

## 2021-10-05 RX ADMIN — METOCLOPRAMIDE HYDROCHLORIDE 5 MG: 5 INJECTION INTRAMUSCULAR; INTRAVENOUS at 21:30

## 2021-10-05 RX ADMIN — IPRATROPIUM BROMIDE AND ALBUTEROL SULFATE 1 AMPULE: 2.5; .5 SOLUTION RESPIRATORY (INHALATION) at 12:15

## 2021-10-05 RX ADMIN — POTASSIUM CHLORIDE: 2 INJECTION, SOLUTION, CONCENTRATE INTRAVENOUS at 18:34

## 2021-10-05 RX ADMIN — SODIUM CHLORIDE, PRESERVATIVE FREE 300 UNITS: 5 INJECTION INTRAVENOUS at 09:01

## 2021-10-05 ASSESSMENT — PAIN SCALES - GENERAL: PAINLEVEL_OUTOF10: 0

## 2021-10-05 NOTE — PROGRESS NOTES
[facility] SURGERY  DAILY PROGRESS NOTE  10/5/2021    CC: nausea    Subjective:  No pain this morning, no nausea and vomiting. No BM since last Monday. Was having flatus overnight. SBFT showed no gastric emptying and sever reflux at GE jxn    Objective:  BP (!) 148/73   Pulse 101   Temp 98 °F (36.7 °C) (Oral)   Resp 18   Ht 5' 6\" (1.676 m)   Wt 169 lb (76.7 kg)   SpO2 93%   BMI 27.28 kg/m²     GENERAL:  No acute distress. Alert and interactive. Oriented x3. LUNGS:  No cough. Nonlabored breathing on RA  CARDIOVASC:  Normal rate, no cyanosis. ABDOMEN:  Soft, non distended, non tender. No guarding / rigidity / rebound. NG to LIS. Incisions CDIT with minimal bruising surrounding incision  EXTREMITIES: Moves all extremities. No swelling or edema. Assessment/Plan:  67 y.o. 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  - still recommend patient return to [facility] under Dr. Jorje Butt service   - daughter apparently refused transfer   - TPN and placement  - pain and nausea control prn  - continue NG tube to LIS  - Reglan     Plan discussed with Dr. Hardeep Cosby     Electronically signed by Wen Rivera MD on 10/5/2021 at 7:28 AM    DGE Grade C - no passage of contrast after 2 hours  Continue TPN  may benefit from decompressive PEG tube placement  DGE will likely last up to a month  Still recommend transfer back to [facility]  Will consult Dr. Liana Sigala for possible decompressive PEG placement.   Patient still may have emesis with this but will need kept to gravity    Electronically signed by Darylene Righter, MD on 10/5/2021 at 11:21 AM

## 2021-10-05 NOTE — CARE COORDINATION
Updated plan of care. Spoke with pt about discharge planning. Currently pt has NG to LIS and TPN. Insurance does not make pt an LTAC candidate. I called several nursing facilities([facility]) all unable to take either NG or TPN. Pt wants home with home care, when stable. Daughter, Rfaael Bedoya, is RN and will wait to speak to her about possible home care companies.  Will continue to follow pending progress and pt needs-o

## 2021-10-05 NOTE — PROGRESS NOTES
Occupational Therapy  OCCUPATIONAL THERAPY INITIAL EVALUATION      Date:10/5/2021  Patient Name: Ming Kumar  MRN: [id number]  : 1945  Room: [address] [place] [address], [address] & [facility], [place]         Date:10/5/2021                                                  Patient Name: Ming Kumar    MRN: [id number]    : 1945    Room: [address] [place], [place] [address]      Evaluating OT: Kacey Veras OTR/L   [id number]      Referring GUERRERO Whittaker    Specific Provider Orders/Date:OT eval and treat 2021      Diagnosis:  Hypoxia [R09.02]  Pleural effusion, bilateral [J90]     Pertinent Medical History: HTN, exploratory laparotomy, pylorus-preserving pancreaticoduodenectomy 2021 , thoracentesis 10/2021    Precautions:  Fall Risk, NG, abdominal,       Assessment of current deficits    [x] Functional mobility  [x]ADLs  [x] Strength               []Cognition    [x] Functional transfers   [x] IADLs         [x] Safety Awareness   [x]Endurance    [] Fine Coordination              [x] Balance      [] Vision/perception   []Sensation     []Gross Motor Coordination  [] ROM  [] Delirium                   [] Motor Control     OT PLAN OF CARE   OT POC based on physician orders, patient diagnosis and results of clinical assessment    Frequency/Duration  2-4 days/wk for 2 weeks PRN   Specific OT Treatment Interventions to include:   ADL retraining/adapted techniques and AE recommendations to increase functional independence within precautions                    Energy conservation techniques to improve tolerance for selfcare routine   Functional transfer/mobility training/DME recommendations for increased independence, safety and fall prevention         Patient/family education to increase safety and functional independence             Environmental modifications for safe mobility and completion of ADLs Therapeutic activity to improve functional performance during ADLs. Therapeutic exercise to improve tolerance and functional strength for ADLs    Balance retraining/tolerance tasks for facilitation of postural control with dynamic challenges during ADLs . Positioning to improve functional independence    Recommended Adaptive Equipment: TBD     Home Living: Pt lives with alone, 1 story with 3 steps/rail.   Basement    Equipment owned: walker    Prior Level of Function: (prior to surgery 9/9/2021)  Independent with ADLs , Independent  with IADLs; ambulated with no device    Pain Level: No pain this session ;   Cognition: A&O: 4/4; pleasant, coversing   Memory:  Good    Sequencing:  good   Problem solving:  Good    Judgement/safety:  Good      Functional Assessment:  AM-PAC Daily Activity Raw Score: 16/24   Initial Eval Status  Date: 10/5/21 Treatment Status  Date: STGs = LTGs  Time frame: 10-14 days   Feeding Independent     Grooming SBA/set-up,seated   Decrease standing tolerance   Independent    UB Dressing SBA/set-up   Independent    LB Dressing Mod A  Mod I    Bathing Mod A   Mod I    Toileting Assist with thorough hygiene   Mod I    Bed Mobility  SBA   Supine <> sit   (HOB elevated)   Mod I    Functional Transfers SBA   Sit-stand from bed   Mod I    Functional Mobility SBA,w/walker  Household distance   Fatigued - returned to bed   Mild SOB noted  O2 sats 92-93% on room air   HR 120s  Mod I  with good tolerance    Balance Sitting:     Static:  Supervision     Dynamic:Denise   Standing: SBA   Independent    Activity Tolerance Fair with light activity   Good  with ADL activity    Visual/  Perceptual Glasses: reading                 Hand Dominance right    AROM (PROM) Strength Additional Info:    RUE  WFL WFL  good  and wfl FMC/dexterity noted during ADL tasks       LUE WFL WFL  good  and wfl FMC/dexterity noted during ADL tasks       Hearing: [place] Sensation:  No c/o numbness or tingling   Tone: WFL  Edema: none observed     Comments: Upon arrival patient lying in bed . At end of session, patient returned to bed  with call light and phone within reach, all lines and tubes intact. Daughter in room      Overall patient demonstrated  decreased independence and safety during completion of ADL/functional transfer/mobility tasks. Pt would benefit from continued skilled OT to increase safety and independence with completion of ADL/IADL tasks for functional independence and quality of life. Rehab Potential: good for established goals     Patient / Family Goal: return home      Patient and/or family were instructed on functional diagnosis, prognosis/goals and OT plan of care. Demonstrated good  understanding. Eval Complexity: Low    Time In: 1513  Time Out: 1532      Min Units   OT Eval Low 97165 x  1   OT Eval Medium 62261      OT Eval High 65714      OT Re-Eval [id number]       Therapeutic Ex [id number]       Therapeutic Activities 21317       ADL/Self Care 33977       Orthotic Management 95962       Manual 47190     Neuro Re-Ed 95139       Non-Billable Time          Evaluation Time additionally includes thorough review of current medical information, gathering information on past medical history/social history and prior level of function, interpretation of standardized testing/informal observation of tasks, assessment of data and development of plan of care and goals.             Evans Bloch  OTR/L  OT [id number]

## 2021-10-05 NOTE — PATIENT CARE CONFERENCE
P Quality Flow/Interdisciplinary Rounds Progress Note        Quality Flow Rounds held on October 5, 2021    Disciplines Attending:  Bedside Nurse, ,  and Nursing Unit Leadership    Ming Kumar was admitted on 9/29/2021 11:13 PM    Anticipated Discharge Date:  Expected Discharge Date: 10/03/21    Disposition:    Darren Score:  Darren Scale Score: 19    Readmission Risk              Risk of Unplanned Readmission:  14           Discussed patient goal for the day, patient clinical progression, and barriers to discharge.   The following Goal(s) of the Day/Commitment(s) have been identified:  Dc andrey Fish RN  October 5, 2021

## 2021-10-05 NOTE — PROGRESS NOTES
Physician Progress Note      Arnoldo Wolf  [facility] #:                  [id number]  :                       1945  ADMIT DATE:       2021 11:13 PM  [address] DATE:  RESPONDING  PROVIDER #:        Shane Walton MD          QUERY TEXT:    Patient admitted with pleural effusions. Noted documentation of acute   respiratory failure in Pulmonology consult on . In order to support the   diagnosis of acute respiratory failure, please include additional clinical   indicators in your documentation. Or please document if the diagnosis of   acute respiratory failure has been ruled out after further study. The medical record reflects the following:  Risk Factors: pleural effusions  Clinical Indicators: RR 16-18, Pulse ox 98% on 6L, per ED note \". .. PULM: Lungs   clear to ascultation bilaterally, no wheezes, no crackles. Pita Rast Pita Rast \" and per   Pulmonology consult \". Pita Rast Pita Rast General: The patient is lying in bed comfortably   without any distress. Breathing is not labored. Pita Rast Pita Rast Respiratory: Clear to   auscultation bilaterally without wheezing or crackles. Air entry is   symmetric. Pita Rast Pita Rast \"  Treatment: O2 therapy    Acute Respiratory Failure Clinical Indicators per 3M MS-DRG Training Guide and   Quick Reference Guide:  pO2 < 60 mmHg or SpO2 (pulse oximetry) < 91% breathing room air  pCO2 > 50 and pH < 7.35  P/F ratio (pO2 / FIO2) < 300  pO2 decrease or pCO2 increase by 10 mmHg from baseline (if known)  Supplemental oxygen of 40% or more  Presence of respiratory distress, tachypnea, dyspnea, shortness of breath,   wheezing  Unable to speak in complete sentences  Use of accessory muscles to breathe  Extreme anxiety and feeling of impending doom  Tripod position  Confusion/altered mental status/obtunded    Thank you,  Teddy RAMIREZ, RN, CDIS  Clinical Documentation Improvement  [email or web address]  [phone]  Options provided:  -- Acute Respiratory Failure as evidenced by, Please document evidence.   -- Acute Respiratory Failure ruled out after study  -- Other - I will add my own diagnosis  -- Disagree - Not applicable / Not valid  -- Disagree - Clinically unable to determine / Unknown  -- Refer to Clinical Documentation Reviewer    PROVIDER RESPONSE TEXT:    This patient is in acute respiratory failure as evidenced by hypoxia requiring   6L oxygen    Query created by: Joyce Johnson on 10/1/2021 8:47 AM      Electronically signed by:  Tonya Tucker MD 10/5/2021 4:28 PM

## 2021-10-05 NOTE — CONSULTS
Gastroenterology Consult Note   Kike Chisholm [facility] with Kori Moody M.D. Consult Note      Date of Service: 10/5/2021  Reason for Consult: possible decompressive PEG placement  Requesting Physician: Dr. Hernadez Aw:  N/V, shortness of breath    History Obtained From:  Patient, sister-in-law, and electronic medical record    HISTORY OF PRESENT ILLNESS:       Carmella Hayward is a 67 y.o. female with significant past medical history of IPMN with low-grade dysplasia, GERD, hypertension, and depression admitted via ED 9/30/2021 for nausea, vomiting, and shortness of breath. Patient recently admitted to [facility] with pancreatic head mass consistent with SB-IPMN. Patient had exploratory laparotomy, pylorus-preserving pancreaticoduodenectomy 9/9/2021 - SB-IPMN with low grade dysplasia and PANIN. She developed ileus with high NG output 9/16/2021, started on erythromycin to 50 mg IV every 6 hours. Patient with decreased oxygen requirements, tachycardia, hypotension, and low urine output unresponsive to fluid 9/16/2021, CT abdomen positive for gastric pneumatosis with loculated fluid collection. CT chest consistent with pneumonia bilateral effusions, she completed 5-day course of Zosyn for aspiration pneumonia which was stopped and later restarted per care everywhere note. She had ST elevation on telemetry 9/16/2021, cardiac enzymes negative, echo with normal RV/LV and no WMA. She was started on TPN, it was discontinued 9/17/2021. Patient reportedly improved, diet was advanced to soft solids 9/27/2021 and she was discharged home with home PT. Patient was reportedly home x2 days then developed nausea vomiting, and shortness of breath. Patient admitted with acute hypoxic respiratory failure with small pleural effusion and compressive atelectasis left lower lobe and recent aspiration pneumonia completing Zosyn, pulmonary consulted. Dr. Evelin Ruiz consulted. Transfer back to [facility] recommended per hepatobiliary surgery. Patient now with delayed gastric emptying, grade C and malnutrition. Small bowel follow-through 10/4/2021 showed Gastrografin contrast administered and no gastric emptying is identified on initial or delayed images. High GE reflux is present without aspiration. The junction of the gastric fundus and body shows an apparent solitary 2 cm filling defect which appears to correlate with an intraluminal gastric lipoma evident by recent CT scan. This finding is believed unrelated to delayed gastric emptying. * NG tube inserted, abdominal x-ray 10/4/2021 shows enteric tube tip in the mid body of the stomach. Contrast identified within the lumen of the stomach. No definitive evidence for extravasation. Patient has had no BM since last Monday,+ flatus. Patient may benefit from decompressive PEG placement therefore consultation obtained. Pt reports he is only had 3 BMs since her surgery, the last was on 9/27/2021. Patient states stools were brown. She reports passing flatus. She states she has had weight loss of about 20#since her surgery. Patient remains with NG tube, states she just wants to have that tube out of her nose and be able to eat. Patient denies chills, fever, hematemesis, hematochezia, melena, abdominal pain, or diarrhea. Admission labs: Albumin 3.2; alk phos 131; ALT 38; total protein 6; chloride 95; BUN 25; creatinine 0.7; glucose 188; H&H 10.5 & 33.4; MCHC 31.4; platelet 987; neutrophil 85.5%; lymphs 6.9%; absolute lymphs 0.56; absolute eos 0.01. Abdominal x-ray-Enteric tube tip in the mid body of the stomach. Contrast identified within the lumen of the stomach. No definitive evidence for extravasation. CTA pulmonary-No identified pulmonary embolism. Left greater than right pleural effusions with left greater than right lower lobe atelectasis. Irregular opacity in the right lung apex may represent an area of scarring or fibrosis.   Neoplastic process is difficult to exclude. If no previous study is available for comparison, further characterization with PET scan would be recommended. Alternatively, follow-up CT in 3 months if PET scan cannot be obtained. Tiny areas of subpleural nodularity bilaterally are favored to be incidental but could also be followed on subsequent exams. US lower extremities-1. Echogenic thrombus identified in the left peroneal vein. No color flow identified on color Doppler evaluation. 2.  No additional evidence of venous thrombus in either lower extremity. All other results below. Consultation for possible decompressive PEG placement. Currently, pt reports no abdominal pain. States she has not had a BM since 9/27/2021. States she wants her NG tube out and is agreeable to PEG tube for decompression. Patient reports no nausea or vomiting. Sister-in-law at bedside, all patient and her questions answered. Labs today: Albumin 3.1; alk phos 123; glucose 243; H&H 9.9 & 32.7; MCHC 30.3; absolute lymphs 1.11.     Past Medical History:        Diagnosis Date    Depression     GERD (gastroesophageal reflux disease)     Hypertension      Past Surgical History:        Procedure Laterality Date    PANCREAS SURGERY  09/09/2021    Pylorus-Preserving Pancreaticoduodenectomy at [facility] by Dr. Bob Dey     Current Medications:    Current Facility-Administered Medications: PN-Adult  3 IN 1 Central Line (Standard), , IntraVENous, Continuous TPN  bisacodyl (DULCOLAX) suppository 10 mg, 10 mg, Rectal, Daily PRN  diatrizoate meglumine-sodium (GASTROGRAFIN) 66-10 % solution 120 mL, 120 mL, Oral, ONCE PRN  metoclopramide (REGLAN) injection 5 mg, 5 mg, IntraVENous, Q6H  PN-Adult  3 IN 1 Central Line (Standard), , IntraVENous, Continuous TPN  apixaban (ELIQUIS) tablet 5 mg, 5 mg, Oral, BID  pantoprazole (PROTONIX) injection 40 mg, 40 mg, IntraVENous, BID **AND** sodium chloride (PF) 0.9 % injection 10 mL, 10 mL, IntraVENous, BID  lidocaine PF 1 % injection 5 mL, 5 mL, IntraDERmal, Once  sodium chloride flush 0.9 % injection 5-40 mL, 5-40 mL, IntraVENous, 2 times per day  sodium chloride flush 0.9 % injection 5-40 mL, 5-40 mL, IntraVENous, PRN  0.9 % sodium chloride infusion, 25 mL, IntraVENous, PRN  heparin flush 100 UNIT/ML injection 300 Units, 3 mL, IntraVENous, 2 times per day  heparin flush 100 UNIT/ML injection 300 Units, 3 mL, IntraCATHeter, PRN  diphenhydrAMINE (BENADRYL) injection 25 mg, 25 mg, IntraVENous, Q6H PRN  melatonin tablet 3 mg, 3 mg, Oral, Nightly PRN  butamben-tetracaine-benzocaine (CETACAINE) spray 1 spray, 1 spray, Topical, Q2H PRN  [COMPLETED] pantoprazole (PROTONIX) injection 40 mg, 40 mg, IntraVENous, Once **AND** sodium chloride (PF) 0.9 % injection 10 mL, 10 mL, IntraVENous, Daily  losartan (COZAAR) tablet 50 mg, 50 mg, Oral, Daily  sertraline (ZOLOFT) tablet 25 mg, 25 mg, Oral, Daily  sodium chloride flush 0.9 % injection 10 mL, 10 mL, IntraVENous, 2 times per day  sodium chloride flush 0.9 % injection 10 mL, 10 mL, IntraVENous, PRN  0.9 % sodium chloride infusion, 25 mL, IntraVENous, PRN  potassium chloride (KLOR-CON M) extended release tablet 40 mEq, 40 mEq, Oral, PRN **OR** potassium bicarb-citric acid (EFFER-K) effervescent tablet 40 mEq, 40 mEq, Oral, PRN **OR** potassium chloride 10 mEq/100 mL IVPB (Peripheral Line), 10 mEq, IntraVENous, PRN  magnesium hydroxide (MILK OF MAGNESIA) 400 MG/5ML suspension 30 mL, 30 mL, Oral, Daily PRN  acetaminophen (TYLENOL) tablet 650 mg, 650 mg, Oral, Q6H PRN **OR** acetaminophen (TYLENOL) suppository 650 mg, 650 mg, Rectal, Q6H PRN  trimethobenzamide (TIGAN) injection 200 mg, 200 mg, IntraMUSCular, Q6H PRN  ipratropium-albuterol (DUONEB) nebulizer solution 1 ampule, 1 ampule, Inhalation, Q4H WA    Allergies:  Chocolate    Social History:    Tobacco:  Pt reports she smokes cigarettes of 1 PPD x30 years stating she quit 18 years ago.   Alcohol:  Pt reports she has an alcoholic drink socially, [employer] 10/05/2021    BASOSABS 0.03 10/05/2021     CMP:    Lab Results   Component Value Date     10/05/2021    K 4.6 10/05/2021    K 3.9 10/01/2021     10/05/2021    CO2 29 10/05/2021    BUN 19 10/05/2021    CREATININE 0.5 10/05/2021    GFRAA >60 10/05/2021    LABGLOM >60 10/05/2021    GLUCOSE 243 10/05/2021    PROT 6.5 10/05/2021    LABALBU 3.1 10/05/2021    CALCIUM 8.9 10/05/2021    BILITOT 0.3 10/05/2021    ALKPHOS 123 10/05/2021    AST 22 10/05/2021    ALT 26 10/05/2021     Hepatic Function Panel:    Lab Results   Component Value Date    ALKPHOS 123 10/05/2021    ALT 26 10/05/2021    AST 22 10/05/2021    PROT 6.5 10/05/2021    BILITOT 0.3 10/05/2021    LABALBU 3.1 10/05/2021       No components found for: CHLPL  Lab Results   Component Value Date    TRIG 49 10/02/2021       XR CHEST (2 VW)    Result Date: 10/1/2021  EXAMINATION: TWO XRAY VIEWS OF THE CHEST AP AND LATERAL 10/1/2021 10:31 am COMPARISON: Portable chest and CTA chest 09/30/2021 HISTORY: ORDERING SYSTEM PROVIDED HISTORY: sob TECHNOLOGIST PROVIDED HISTORY: Reason for exam:->sob FINDINGS: As correlated with the lateral view, trace right pleural effusion and small left pleural effusion. Left pleural effusion is the same or mildly increased in size compared to exam 1 day earlier. Otherwise, no significant change. Normal heart size. The pulmonary vascularity is not congested. The upper lobes appear clear. Atherosclerotic thoracic aorta. No pneumothorax. Trace right pleural effusion and small left pleural effusion. Left pleural effusion is the same or mildly increased compared to exam 1 day earlier. Otherwise stable findings. No pneumonia or overt CHF. XR ABDOMEN (KUB) (SINGLE AP VIEW)    Result Date: 10/1/2021  EXAMINATION: ONE SUPINE XRAY VIEW(S) OF THE ABDOMEN 10/1/2021 3:31 pm COMPARISON: Abdominal radiographs, 09/30/2021.  HISTORY: ORDERING SYSTEM PROVIDED HISTORY: Access for delayed gatric emptying TECHNOLOGIST PROVIDED HISTORY: Portable KUB Reason for exam:->Access for delayed gatric emptying FINDINGS: The bowel gas pattern is nonobstructive. There is moderate fecal retention in the right colon. There is mild gaseous distention of colon along the splenic flexure. The distal colon is otherwise relatively decompressed. No nephrolithiasis appreciated. Mild degenerative changes seen in the lumbar spine most notably at L4-5. Nonobstructive bowel gas pattern. XR CHEST PORTABLE    Result Date: 10/2/2021  EXAMINATION: ONE XRAY VIEW OF THE CHEST 10/2/2021 12:46 pm COMPARISON: October 1, 2021 HISTORY: ORDERING SYSTEM PROVIDED HISTORY: R PICC line confirmation TECHNOLOGIST PROVIDED HISTORY: Reason for exam:->R PICC line confirmation FINDINGS: There is borderline cardiac size. There is vascular congestion with minimal infiltrates and pleural effusion in the lung bases which may be due to mild CHF and or pneumonia. Nasogastric tube is noted with the tip in the stomach. Right PICC line is noted with the tip in the superior vena cava without complications. Stable abnormal chest with persistent atelectasis/infiltrates and pleural effusion in lung bases which may be due to mild CHF and or pneumonia. Right PICC line with the tip in the superior vena cava and a nasogastric tube in the stomach. XR CHEST PORTABLE    Result Date: 10/1/2021  EXAMINATION: ONE XRAY VIEW OF THE CHEST 10/1/2021 1:17 pm COMPARISON: Two view chest x-ray 10/01/2021 HISTORY: ORDERING SYSTEM PROVIDED HISTORY: post left thoracentesis TECHNOLOGIST PROVIDED HISTORY: Reason for exam:->post left thoracentesis FINDINGS: The cardiac silhouette is within normal limits. The mediastinal contours are within normal limits. There is evidence of aortic atherosclerosis. Small bilateral pleural effusions seen on the previous exam are no longer visualized (the right of which was only visible on the lateral view previously) with improved aeration of the left lung base.   There is linear atelectasis/scarring in the left lung base. There is redemonstration of calcified granulomas in the right lung base. No evidence of pneumothorax. Mild osseous degenerative changes are present. 1. No pneumothorax. 2. There is improved aeration of the left lung base with linear atelectasis/scarring. XR CHEST PORTABLE    Result Date: 9/30/2021  EXAMINATION: ONE XRAY VIEW OF THE CHEST 9/30/2021 1:35 pm COMPARISON: None. HISTORY: ORDERING SYSTEM PROVIDED HISTORY: atelectasis, pleural effusion TECHNOLOGIST PROVIDED HISTORY: Reason for exam:->atelectasis, pleural effusion FINDINGS: The lungs are without acute focal process. There is no effusion or pneumothorax. The cardiomediastinal silhouette is without acute process. The osseous structures are without acute process. No acute process. CTA PULMONARY W CONTRAST    Result Date: 9/30/2021  EXAMINATION: CTA OF THE CHEST 9/30/2021 3:01 am TECHNIQUE: CTA of the chest was performed after the administration of intravenous contrast.  Multiplanar reformatted images are provided for review. MIP images are provided for review. Dose modulation, iterative reconstruction, and/or weight based adjustment of the mA/kV was utilized to reduce the radiation dose to as low as reasonably achievable. COMPARISON: None. HISTORY: ORDERING SYSTEM PROVIDED HISTORY: SOB, pain, recent surgery TECHNOLOGIST PROVIDED HISTORY: Reason for exam:->SOB, pain, recent surgery Decision Support Exception - unselect if not a suspected or confirmed emergency medical condition->Emergency Medical Condition (MA) FINDINGS: Pulmonary Arteries: Assessment is somewhat limited by patient motion as well as some artifact from parenchymal opacities adjacent to the smaller peripheral pulmonary arteries. Allowing for this, no identified pulmonary embolism. Mediastinum: No evidence of mediastinal lymphadenopathy. The heart and pericardium demonstrate no acute abnormality.   There is no acute abnormality of the thoracic aorta. Scattered vascular calcifications. Lungs/pleura: Small left pleural effusion and tiny right pleural effusion. Moderate left lower lobe atelectasis and minimal right lower lobe dependent atelectasis. A somewhat ill-defined or irregular opacity in the right lung apex measures 1.1 x 1.6 x 0.6 cm. Scattered calcifications, nonspecific but suggestive of old granulomatous disease. Tiny foci of subpleural nodularity in the right lower lobe measuring 2 or 3 mm in size. There is also a 3 mm subpleural left upper lobe nodule laterally. Upper Abdomen: Small hiatal hernia. Partial visualization of an apparent lipoma along the medial aspect of the stomach measuring 2.9 cm. This could be followed as indicated. Soft Tissues/Bones: Degenerative changes are scattered in the spine. Bilateral breast implants. No identified pulmonary embolism. Left greater than right pleural effusions with left greater than right lower lobe atelectasis. Irregular opacity in the right lung apex may represent an area of scarring or fibrosis. Neoplastic process is difficult to exclude. If no previous study is available for comparison, further characterization with PET scan would be recommended. Alternatively, follow-up CT in 3 months if PET scan cannot be obtained. Tiny areas of subpleural nodularity bilaterally are favored to be incidental but could also be followed on subsequent exams. XR ABDOMEN FOR NG/OG/NE TUBE PLACEMENT    Result Date: 10/4/2021  EXAMINATION: ONE SUPINE XRAY VIEW(S) OF THE ABDOMEN 10/4/2021 3:36 pm COMPARISON: 10/01/2021 HISTORY: ORDERING SYSTEM PROVIDED HISTORY: Confirmation of course of NG/OG/NE tube and location of tip of tube TECHNOLOGIST PROVIDED HISTORY: Reason for exam:->Confirmation of course of NG/OG/NE tube and location of tip of tube Portable? ->Yes FINDINGS: Nonspecific bowel gas pattern without evidence of obstruction. No abnormal calcifications. No acute osseous abnormality. Contrast identified within the gastric lumen. Enteric tube tip in the mid body of the stomach. Enteric tube tip in the mid body of the stomach. Contrast identified within the lumen of the stomach. No definitive evidence for extravasation. XR ABDOMEN FOR NG/OG/NE TUBE PLACEMENT    Result Date: 10/1/2021  EXAMINATION: ONE SUPINE XRAY VIEW(S) OF THE ABDOMEN 10/1/2021 6:11 pm COMPARISON: Chest series from the same day at 15:50 HISTORY: ORDERING SYSTEM PROVIDED HISTORY: Confirmation of course of NG/OG/NE tube and location of tip of tube TECHNOLOGIST PROVIDED HISTORY: Reason for exam:->Confirmation of course of NG/OG/NE tube and location of tip of tube Portable? ->Yes FINDINGS: Coarse interstitial markings in the lungs. Atherosclerotic disease. Stable lingular opacity. Stable calcified granuloma in the right lower lung. Enteric tube with distal tip and side port subdiaphragmatic. Distal tip projects over the left upper quadrant. Osseous structures imaged demonstrate no acute findings. Satisfactory position of the enteric tube. FL SMALL BOWEL FOLLOW THROUGH ONLY    Result Date: 10/4/2021  EXAMINATION: GASTROGRAFIN SMALL BOWEL FOLLOW THROUGH SERIES 10/4/2021 TECHNIQUE: Small bowel follow through series was attempted with overhead images and spot images of the stomach. FLUOROSCOPY DOSE AND TYPE OR TIME AND EXPOSURES: Fluoroscopic images: 3 Fluoroscopic time: 2.1 minutes Total dose: 38.98 mGy COMPARISON: CTA chest 09/30/2021 HISTORY: ORDERING SYSTEM PROVIDED HISTORY: evaluate for extent of delayed gastris emptying TECHNOLOGIST PROVIDED HISTORY: Gastrograffin contrast Please use NGT to administer contrast Reason for exam:->evaluate for extent of delayed gastris emptying FINDINGS: The preliminary  views show the enteric tube tip in the region of the stomach body. The stomach, small bowel, and colon are nondilated.  Under fluoroscopic visualization, 100 mL Gastrografin contrast was administered via the indwelling enteric tube. The stomach is nondilated. With initial imaging and with the patient standing, the stomach does not empty. There is high GE reflux to the upper esophagus and without aspiration. Delayed imaging was obtained at 30 minutes, 1 hour, and 2 hours and with the patient seated upright between imaging. No gastric emptying is identified on initial or delayed images. At the junction of the fundus and body of the stomach, the stomach shows an apparent 2 cm filling defect and this appears to correlate with an intraluminal fatty lesion evident by recent  CTA chest exam.     1.  Gastrografin contrast administered and no gastric emptying is identified on initial or delayed images. High GE reflux is present without aspiration. 2.  The junction of the gastric fundus and body shows an apparent solitary 2 cm filling defect which appears to correlate with an intraluminal gastric lipoma evident by recent CT exam.  This finding is believed unrelated to delayed gastric emptying. US DUP LOWER EXTREMITIES BILATERAL VENOUS    Result Date: 10/1/2021  EXAMINATION: DUPLEX VENOUS ULTRASOUND OF THE BILATERAL LOWER [id number]/1/2021 1:36 pm TECHNIQUE: Duplex ultrasound using B-mode/gray scaled imaging, Doppler spectral analysis and color flow Doppler was obtained of the deep venous structures of the lower bilateral extremities. COMPARISON: None. HISTORY: ORDERING SYSTEM PROVIDED HISTORY: poss dvt? TECHNOLOGIST PROVIDED HISTORY: Reason for exam:->poss dvt? What reading provider will be dictating this exam?->CRC FINDINGS: Echogenic thrombus is identified in the left peroneal vein. No color flow identified on color Doppler evaluation. The remaining visualized veins of the bilateral lower extremities are patent and free of echogenic thrombus. The veins demonstrate good compressibility with normal color flow study and spectral analysis. 1.  Echogenic thrombus identified in the left peroneal vein.   No color flow identified on color Doppler evaluation. 2.  No additional evidence of venous thrombus in either lower extremity. The findings were sent to the Radiology Results [id number] at 2:45 pm on 10/1/2021to be communicated to a licensed caregiver. XR ABDOMEN (2 VIEWS)    Result Date: 9/30/2021  EXAMINATION: 3 XRAY VIEWS OF THE ABDOMEN 9/30/2021 7:03 am COMPARISON: None. HISTORY: ORDERING SYSTEM PROVIDED HISTORY: abd pain TECHNOLOGIST PROVIDED HISTORY: Reason for exam:->abd pain FINDINGS: EKG leads overlie the lower chest and the abdomen. Nonspecific nonobstructive bowel gas pattern. There is some contrast in the kidneys, ureters and urinary bladder from CT performed earlier today. Degenerative changes are present in the spine and pelvis. Small pleural effusions at the base of the chest.     Nonspecific abdomen. IMPRESSION:  · Delayed gastric emptying  · Pylorus-preserving pancreaticoduodenectomy 9/9/2021 at [facility] for IPMN with low-grade dysplasia  · Pneumatosis -surgery following  · Nausea and vomiting -resolved  · Severe constipation  · Elevated alk phos  · Anemia, normocytic  · Pleural effusions-pulmonary following  · Echogenic thrombus left peroneal vein-defer to PCP    RECOMMENDATIONS:    · EGD with decompressive PEG tomorrow with Dr. Lydia Melo. Procedure details for EGD with PEG discussed in detail. Complications including but not limited to, perforation, bleeding and infection were discussed in great detail. Risks, benefits, and alternatives explained. Pt has understood the information and has agreed to proceed. · NG to [address]-defer to surgery  · TPN per surgery  · Medicate for nausea as ordered per PCP  · Supportive care  · Defer comorbidities to others  · Continue to monitor    Note: This report was completed utilizing computer voice recognition software. Every effort has been made to ensure accuracy, however; inadvertent computerized transcription errors may be present.      Thank you very much for your consultation. We will follow closely with you. Discussed with Dr. Gauri Caraballo developed by Dr. Tabitha Diallo POVI-TEWI-DS, FNP-BC 10/5/2021 11:48 AM for Dr. Collin Knight. I HAD A FACE TO FACE ENCOUNTER WITH THE PATIENT. AGREE WITH THE EXAM, ASSESSMENT, AND PLAN AS OUTLINED ABOVE. ADDITION AND CORRECTIONS WERE DONE AS DEEMED APPROPRIATE. MY EXAM AND PLAN INCLUDE: LONG DISCUSSION WITH PATIENT AND DAUGHTER, ALL QUESTIONS ANSWERED. FOR DECOMPRESSION PEG TUBE IN AM. ORDERS WRITTEN.

## 2021-10-05 NOTE — PROGRESS NOTES
Subjective: The patient is awake and alert. No acute events overnight. Denies chest pain, angina, SOB       Objective:    BP (!) 162/80   Pulse 102   Temp 98 °F (36.7 °C) (Oral)   Resp 16   Ht 5' 6\" (1.676 m)   Wt 169 lb (76.7 kg)   SpO2 93%   BMI 27.28 kg/m²     In: 1489   Out: 3200   In: 1489   Out: 3200 [Urine:1900]    General appearance: NAD, conversant  HEENT: AT/NC, MMM- NG remains in place   Neck: FROM, supple  Lungs: diminished  CV: RRR, no MRGs  Vasc: Radial pulses 2+  Abdomen: Soft, non-tender; no masses or HSM, hypoactive BS, disteneded  Extremities: No peripheral edema or digital cyanosis  Skin: no rash, lesions or ulcers  Psych: Alert and oriented to person, place and time  Neuro: Alert and interactive     Recent Labs     10/04/21  0610 10/05/21  0401   WBC 4.1* 4.9   HGB 9.7* 9.9*   HCT 31.0* 32.7*    357       Recent Labs     10/03/21  0426 10/04/21  0610 10/05/21  0401    140 146   K 4.1 3.9 4.6   CL 99 101 105   CO2 32* 30* 29   BUN 18 18 19   CREATININE 0.6 0.5 0.5   CALCIUM 8.4* 8.7 8.9       Assessment:    Principal Problem:    Hypoxia  Active Problems:    Pleural effusion    Delayed gastric emptying    IPMN (intraductal papillary mucinous neoplasm)  Resolved Problems:    * No resolved hospital problems. *      Plan:    Patient is a 67year old female with a PMH of depression, GERD and hypertension who presents to the emergency room for SOB and nausea and vomiting.     SOB d/t pleural effusions   - CTA Pulm: Left and right pleural effusions with the left larger than the right as well as bilateral lower lobe atelectasis. -Monitor labs - WBC   -procal, sed rate, CRP, blood cultures pending  -Duonebs  -Incentive spirometry   -1L NC o2 - not on any at home, previously on 5L  -Monitor O2 saturations and supplement oxygen to keep saturations greater than 93%, wean as necessary  -Left thoracentesis 10/1/21 400cc off  -D-Dimer 1,780 -?  Concern for pe , initial cta negative , reconsider repeat CTA if worsening pulm status   -US bilateral lowers- Echogenic thrombus identified in the left peroneal vein- eliquis started 10/4- will hold as pt has PEG placement tomorrow     Post-op Whipple procedure  -General surgery following  -XR Abdomen- negative   -NGT to LIS   -TPN per general surgery  -PICC for TPN done 10/2   - PEG tomorrow 10/6- hold eliquis   -SBFT yesterday-no passage of contrast after 2 hours  -Plan for GI consult-decompressive PEG placement    DMT t 2   Worsened sugars from TPN  Start sliding scale lo dose   Check a1c       DVT Prophylaxis - lovenox   PT/OT  Discharge planning    NAMRATA Aleman CNP  1:09 PM  10/5/2021     Above note edited to reflect my thoughts    I personally saw, examined and provided care for the patient. Radiographs, labs and medication list were reviewed by me independently. The case was discussed in detail and plans for care were established. Review of NAMRATA Aleman CNP, documentation was conducted and revisions were made as appropriate directly by me. I agree with the above documented exam, problem list, and plan of care.      Lona Frank MD  6:23 PM  10/5/2021

## 2021-10-05 NOTE — PROGRESS NOTES
Germain Farfan M.D.,[facility]  Samia Colbert D.O., F.A.C.O.I., Crys Doll M.D. Nery Park M.D. Shiva Garcia D.O. Daily Pulmonary Progress Note    Patient:  Hilary Mejia 67 y.o. female MRN: [id number]     Date of Service: 10/5/2021      Synopsis     We are following patient for hypoxia, pleural effusion    \"CC\" shortness of breath    Code status: Full      Subjective      Patient was seen and examined. Lying in bed on RA without distress. Patient states her breathing is much better after the thoracentesis last week. Patient still has NGT to suction and TPN infusing per surgery. Patient denies cough, SOB, chest pain, chest pressure. Review of Systems:     Constitutional: Denies fever, weight loss, night sweats, positive weakness and fatigue  Skin: Denies pigmentation, dark lesions, and rashes   HEENT: Denies hearing loss, tinnitus, ear drainage, epistaxis, sore throat, and hoarseness. Cardiovascular: Denies palpitations, chest pain, and chest pressure.   Respiratory:  Denies cough, shortness of breath with exertion, mucus production  Gastrointestinal:   denies nausea and vomiting  Genitourinary: Denies dysuria, frequency, urgency or hematuria  Musculoskeletal: Denies myalgias, muscle weakness, and bone pain  Neurological: Denies dizziness, vertigo, headache, and focal weakness  Psychological: Denies anxiety and depression  Endocrine: Denies heat intolerance and cold intolerance  Hematopoietic/Lymphatic: Denies bleeding problems and blood transfusions    24-hour events:  None    Objective   Vitals: BP (!) 162/80   Pulse 102   Temp 98 °F (36.7 °C) (Oral)   Resp 16   Ht 5' 6\" (1.676 m)   Wt 169 lb (76.7 kg)   SpO2 93%   BMI 27.28 kg/m²     I/O:    Intake/Output Summary (Last 24 hours) at 10/5/2021 1550  Last data filed at 10/5/2021 1211  Gross per 24 hour   Intake 1909 ml   Output 3200 ml   Net -1291 ml       Vent Information  SpO2: 93 %                CURRENT MEDS :  Scheduled Meds:   metoclopramide  5 mg IntraVENous Q6H    apixaban  5 mg Oral BID    pantoprazole  40 mg IntraVENous BID    And    sodium chloride (PF)  10 mL IntraVENous BID    lidocaine PF  5 mL IntraDERmal Once    sodium chloride flush  5-40 mL IntraVENous 2 times per day    heparin flush  3 mL IntraVENous 2 times per day    sodium chloride (PF)  10 mL IntraVENous Daily    losartan  50 mg Oral Daily    sertraline  25 mg Oral Daily    sodium chloride flush  10 mL IntraVENous 2 times per day    ipratropium-albuterol  1 ampule Inhalation Q4H WA       Physical Exam:  General Appearance: appears comfortable in no acute distress. HEENT: Normocephalic atraumatic without obvious abnormality +NGT   Neck: Lips, mucosa, and tongue normal.  Supple, symmetrical, trachea midline, no adenopathy;thyroid:  no enlargement/tenderness/nodules or JVD. Lung: Breath sounds CTA, diminished bases. Respirations unlabored. Symmetrical expansion. Heart: RRR, normal S1, S2. No MRG  Abdomen: Soft,  No bruit or organomegaly. Extremities: Pedal pulses 2+ symmetric b/l. Extremities normal, no cyanosis, clubbing, or edema. Musculokeletal: No joint swelling, no muscle tenderness. ROM normal in all joints of extremities. Neurologic: Mental status: Alert and Oriented X3     Pertinent/ New Labs and Imaging Studies     Imaging Personally Reviewed:  CXR 10/2  There is borderline cardiac size.  There is vascular congestion with minimal   infiltrates and pleural effusion in the lung bases which may be due to mild   CHF and or pneumonia.  Nasogastric tube is noted with the tip in the stomach.    Right PICC line is noted with the tip in the superior vena cava without   complications.             CTA chest 9/30  No identified pulmonary embolism.       Left greater than right pleural effusions with left greater than right lower   lobe atelectasis.       Irregular opacity in the right lung apex may represent an area of scarring or fibrosis.  Neoplastic process is difficult to exclude.  If no previous study   is available for comparison, further characterization with PET scan would be   recommended.  Alternatively, follow-up CT in 3 months if PET scan cannot be   obtained.       Tiny areas of subpleural nodularity bilaterally are favored to be incidental   but could also be followed on subsequent exams.               Echo:  [facility] 9/17/2021  CONCLUSIONS:   - Exam indication: Hypotension   - The left ventricle is normal in size. Left ventricular systolic function is   normal. EF = 68 ± 5% (2D biplane) Definity contrast used for endocardial border   detection. Normal left ventricular diastolic function.   - The right ventricle is normal in size. Right ventricular systolic function is   normal.   - Estimated right ventricular systolic pressure is not reported due to an   insufficient tricuspid regurgitation signal. Estimated right atrial pressure is 8   mmHg based on IVC assessment. - There are no significant valvular abnormalities. - The patient has not had a prior CC echocardiographic exam for comparison.      [facility] CT chest without contrast 9/16/2021  1.  Bilateral low-density pleural effusions, left greater than right.     Dependent consolidative airspace opacities associated with volume loss in   both lower lobes, most likely secondary to nonspecific atelectasis. 2. Heterogeneous airspace/groundglass opacity in the right upper lobe,   likely secondary to focal pneumonia.  Additional peribronchiolar   groundglass and airspace opacities which are likely infectious in nature   are identified in the right lung. 3. Multiple vague, tiny centrilobular nodular densities are scattered in   both upper lobes, most commonly secondary to nonspecific inflammatory   bronchiolitis, possibly infectious in nature.      4. No region of intrathoracic lymphadenopathy is identified.       Ultrasound legs 9/16/2021        Non-Invasive Vascular Laboratory   [facility] Portable       Lower Extremity Venous Duplex   Bilateral/Complete     Date of service/time: 9/16/2021 8:59:06 AM   MRN: [id number]   Accession #: [id number]   ----------   Technically difficult exam due to patient positioning and edema. RIGHT SIDE - DEEP VEINS   Negative for acute deep vein thrombosis. Only segments visualized of the peroneal veins. LEFT SIDE - DEEP VEINS   Negative for acute deep vein thrombosis. Only segments visualized of the peroneal veins.                 Labs:    Lab Results   Component Value Date    WBC 4.9 10/05/2021    HGB 9.9 10/05/2021    HCT 32.7 10/05/2021    MCV 90.3 10/05/2021    MCH 27.3 10/05/2021    MCHC 30.3 10/05/2021    RDW 13.6 10/05/2021     10/05/2021    MPV 10.2 10/05/2021     Lab Results   Component Value Date     10/05/2021    K 4.6 10/05/2021    K 3.9 10/01/2021     10/05/2021    CO2 29 10/05/2021    BUN 19 10/05/2021    CREATININE 0.5 10/05/2021    LABALBU 3.1 10/05/2021    CALCIUM 8.9 10/05/2021    GFRAA >60 10/05/2021    LABGLOM >60 10/05/2021     No results found for: PROTIME, INR  No results for input(s): PROBNP in the last 72 hours. No results for input(s): PROCAL in the last 72 hours. This SmartLink has not been configured with any valid records. Micro:  No results for input(s): CULTRESP in the last 72 hours. No results for input(s): LABGRAM in the last 72 hours. No results for input(s): LEGUR in the last 72 hours. No results for input(s): STREPNEUMAGU in the last 72 hours. No results for input(s): LP1UAG in the last 72 hours. Surgical pathology 9/9/2021  Specimen originated from [facility]     Specimen #: [id number]   Submitting Physician: SALLY Colon)       __________________________________________________________________________     FINAL DIAGNOSIS     1. Gallbladder, cholecystectomy (A) - Gallbladder with focal chronic   inflammation.      2. Pancreas, main duct, margin, excision (B) - Cross section of pancreas   with pancreatic intraepithelial neoplasia (PAN-IN1). 3. Duodenum, common bile duct and head of pancreas, Pylorus-Preserving   Pancreaticoduodenectomy(C)   - Intraductal papillary mucinous neoplasm, side branch. - Twelve regional lymph nodes with no significant diagnostic alterations. 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         Specimen Request  Specimen collected in surgery. Specimen received in sterile container.        Smear Result  No organisms seen        Smear Result  No Polymorphonuclear Leukocytes        Culture  No growth 3 days                Assessment:    1. Acute hypoxic respiratory failure  2. Small pleural effusion with compressive atelectasis left lower lobe  3. Small area of scar and right lung apex  4. Recent aspiration pneumonia-completed antibiotics Zosyn  5. 9/9/2021 Whipple procedure at [facility]  6. Pancreatic cyst pathology negative for malignant cells  7. Nausea and vomiting likely related to delayed gastric emptying-recent NG tube placement post Whipple procedure  8. GERD  9. Hypertension  10. 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 Constipation      Plan:   1. Monitor off oxygen, patient on room air  2. Chest x-ray improved   3. Continue lung recruitment maneuvers-incentive spirometer, EZ Pap with scheduled bronchodilators every 4 hours if able to tolerate  4. NG tube to low intermittent suction and TPN per surgery  5. GI prophylaxis and Protonix. 6. We will follow patient intermittently     This plan of care was reviewed in collaboration with Dr. Alicia Wong  Electronically signed by NAMRATA Laura - CNP on 10/5/2021 at 3:50 PM    I personally saw, examined, and cared for the patient. Labs, medications, radiographs reviewed.  I agree with history exam and plans detailed in NP note with the following additions:    Feels well and is off oxygen  Reviewed GI note  She will have a decompression PEG tomorrow  We are available as needed   Please call if any new pulmonary problems arise    Electronically signed by Jacobo Amaral MD on 10/5/2021 at 10:51 PM

## 2021-10-06 ENCOUNTER — ANESTHESIA EVENT (OUTPATIENT)
Dept: ENDOSCOPY | Age: 76
DRG: 186 | End: 2021-10-06
Payer: MEDICARE

## 2021-10-06 ENCOUNTER — ANESTHESIA (OUTPATIENT)
Dept: ENDOSCOPY | Age: 76
DRG: 186 | End: 2021-10-06
Payer: MEDICARE

## 2021-10-06 VITALS — DIASTOLIC BLOOD PRESSURE: 35 MMHG | OXYGEN SATURATION: 97 % | SYSTOLIC BLOOD PRESSURE: 72 MMHG

## 2021-10-06 LAB
ALBUMIN SERPL-MCNC: 3.1 G/DL (ref 3.5–5.2)
ALP BLD-CCNC: 121 U/L (ref 35–104)
ALT SERPL-CCNC: 22 U/L (ref 0–32)
ANION GAP SERPL CALCULATED.3IONS-SCNC: 9 MMOL/L (ref 7–16)
APTT: 26.6 SEC (ref 24.5–35.1)
AST SERPL-CCNC: 18 U/L (ref 0–31)
BASOPHILS ABSOLUTE: 0.05 E9/L (ref 0–0.2)
BASOPHILS RELATIVE PERCENT: 0.9 % (ref 0–2)
BILIRUB SERPL-MCNC: 0.3 MG/DL (ref 0–1.2)
BUN BLDV-MCNC: 22 MG/DL (ref 6–23)
CALCIUM SERPL-MCNC: 9.3 MG/DL (ref 8.6–10.2)
CHLORIDE BLD-SCNC: 107 MMOL/L (ref 98–107)
CO2: 30 MMOL/L (ref 22–29)
CREAT SERPL-MCNC: 0.6 MG/DL (ref 0.5–1)
EOSINOPHILS ABSOLUTE: 0.09 E9/L (ref 0.05–0.5)
EOSINOPHILS RELATIVE PERCENT: 1.6 % (ref 0–6)
GFR AFRICAN AMERICAN: >60
GFR NON-AFRICAN AMERICAN: >60 ML/MIN/1.73
GLUCOSE BLD-MCNC: 198 MG/DL (ref 74–99)
HCT VFR BLD CALC: 32.1 % (ref 34–48)
HEMOGLOBIN: 9.8 G/DL (ref 11.5–15.5)
IMMATURE GRANULOCYTES #: 0.04 E9/L
IMMATURE GRANULOCYTES %: 0.7 % (ref 0–5)
INR BLD: 1.2
LYMPHOCYTES ABSOLUTE: 1.23 E9/L (ref 1.5–4)
LYMPHOCYTES RELATIVE PERCENT: 21.4 % (ref 20–42)
MCH RBC QN AUTO: 27.2 PG (ref 26–35)
MCHC RBC AUTO-ENTMCNC: 30.5 % (ref 32–34.5)
MCV RBC AUTO: 89.2 FL (ref 80–99.9)
METER GLUCOSE: 222 MG/DL (ref 74–99)
METER GLUCOSE: 226 MG/DL (ref 74–99)
METER GLUCOSE: 235 MG/DL (ref 74–99)
METER GLUCOSE: 238 MG/DL (ref 74–99)
MONOCYTES ABSOLUTE: 0.51 E9/L (ref 0.1–0.95)
MONOCYTES RELATIVE PERCENT: 8.9 % (ref 2–12)
NEUTROPHILS ABSOLUTE: 3.82 E9/L (ref 1.8–7.3)
NEUTROPHILS RELATIVE PERCENT: 66.5 % (ref 43–80)
PDW BLD-RTO: 14 FL (ref 11.5–15)
PLATELET # BLD: 327 E9/L (ref 130–450)
PMV BLD AUTO: 10.5 FL (ref 7–12)
POTASSIUM SERPL-SCNC: 4 MMOL/L (ref 3.5–5)
PROTHROMBIN TIME: 13.5 SEC (ref 9.3–12.4)
RBC # BLD: 3.6 E12/L (ref 3.5–5.5)
SODIUM BLD-SCNC: 146 MMOL/L (ref 132–146)
TOTAL PROTEIN: 5.9 G/DL (ref 6.4–8.3)
WBC # BLD: 5.7 E9/L (ref 4.5–11.5)

## 2021-10-06 PROCEDURE — 6360000002 HC RX W HCPCS: Performed by: INTERNAL MEDICINE

## 2021-10-06 PROCEDURE — 2580000003 HC RX 258: Performed by: INTERNAL MEDICINE

## 2021-10-06 PROCEDURE — C9113 INJ PANTOPRAZOLE SODIUM, VIA: HCPCS | Performed by: INTERNAL MEDICINE

## 2021-10-06 PROCEDURE — 2580000003 HC RX 258: Performed by: NURSE ANESTHETIST, CERTIFIED REGISTERED

## 2021-10-06 PROCEDURE — 6360000002 HC RX W HCPCS: Performed by: PHYSICIAN ASSISTANT

## 2021-10-06 PROCEDURE — 7100000010 HC PHASE II RECOVERY - FIRST 15 MIN: Performed by: INTERNAL MEDICINE

## 2021-10-06 PROCEDURE — 6370000000 HC RX 637 (ALT 250 FOR IP): Performed by: PHYSICIAN ASSISTANT

## 2021-10-06 PROCEDURE — 2709999900 HC NON-CHARGEABLE SUPPLY: Performed by: INTERNAL MEDICINE

## 2021-10-06 PROCEDURE — 2580000003 HC RX 258: Performed by: EMERGENCY MEDICINE

## 2021-10-06 PROCEDURE — 6370000000 HC RX 637 (ALT 250 FOR IP): Performed by: INTERNAL MEDICINE

## 2021-10-06 PROCEDURE — 3E0G76Z INTRODUCTION OF NUTRITIONAL SUBSTANCE INTO UPPER GI, VIA NATURAL OR ARTIFICIAL OPENING: ICD-10-PCS | Performed by: INTERNAL MEDICINE

## 2021-10-06 PROCEDURE — 85025 COMPLETE CBC W/AUTO DIFF WBC: CPT

## 2021-10-06 PROCEDURE — 6360000002 HC RX W HCPCS: Performed by: NURSE ANESTHETIST, CERTIFIED REGISTERED

## 2021-10-06 PROCEDURE — 6360000002 HC RX W HCPCS: Performed by: TRANSPLANT SURGERY

## 2021-10-06 PROCEDURE — 3700000000 HC ANESTHESIA ATTENDED CARE: Performed by: INTERNAL MEDICINE

## 2021-10-06 PROCEDURE — 2580000003 HC RX 258: Performed by: PHYSICIAN ASSISTANT

## 2021-10-06 PROCEDURE — 1200000000 HC SEMI PRIVATE

## 2021-10-06 PROCEDURE — 0DH63UZ INSERTION OF FEEDING DEVICE INTO STOMACH, PERCUTANEOUS APPROACH: ICD-10-PCS | Performed by: INTERNAL MEDICINE

## 2021-10-06 PROCEDURE — 3609013300 HC EGD TUBE PLACEMENT: Performed by: INTERNAL MEDICINE

## 2021-10-06 PROCEDURE — 82962 GLUCOSE BLOOD TEST: CPT

## 2021-10-06 PROCEDURE — 85730 THROMBOPLASTIN TIME PARTIAL: CPT

## 2021-10-06 PROCEDURE — 2500000003 HC RX 250 WO HCPCS: Performed by: INTERNAL MEDICINE

## 2021-10-06 PROCEDURE — 6360000002 HC RX W HCPCS: Performed by: STUDENT IN AN ORGANIZED HEALTH CARE EDUCATION/TRAINING PROGRAM

## 2021-10-06 PROCEDURE — 2580000003 HC RX 258: Performed by: STUDENT IN AN ORGANIZED HEALTH CARE EDUCATION/TRAINING PROGRAM

## 2021-10-06 PROCEDURE — 3700000001 HC ADD 15 MINUTES (ANESTHESIA): Performed by: INTERNAL MEDICINE

## 2021-10-06 PROCEDURE — 80053 COMPREHEN METABOLIC PANEL: CPT

## 2021-10-06 PROCEDURE — 36415 COLL VENOUS BLD VENIPUNCTURE: CPT

## 2021-10-06 PROCEDURE — 85610 PROTHROMBIN TIME: CPT

## 2021-10-06 PROCEDURE — C9113 INJ PANTOPRAZOLE SODIUM, VIA: HCPCS | Performed by: STUDENT IN AN ORGANIZED HEALTH CARE EDUCATION/TRAINING PROGRAM

## 2021-10-06 PROCEDURE — 97530 THERAPEUTIC ACTIVITIES: CPT

## 2021-10-06 PROCEDURE — 94640 AIRWAY INHALATION TREATMENT: CPT

## 2021-10-06 PROCEDURE — 7100000011 HC PHASE II RECOVERY - ADDTL 15 MIN: Performed by: INTERNAL MEDICINE

## 2021-10-06 RX ORDER — MORPHINE SULFATE 2 MG/ML
2 INJECTION, SOLUTION INTRAMUSCULAR; INTRAVENOUS
Status: DISCONTINUED | OUTPATIENT
Start: 2021-10-06 | End: 2021-10-06 | Stop reason: DRUGHIGH

## 2021-10-06 RX ORDER — SODIUM CHLORIDE 9 MG/ML
INJECTION, SOLUTION INTRAVENOUS CONTINUOUS PRN
Status: DISCONTINUED | OUTPATIENT
Start: 2021-10-06 | End: 2021-10-06 | Stop reason: SDUPTHER

## 2021-10-06 RX ORDER — PROPOFOL 10 MG/ML
INJECTION, EMULSION INTRAVENOUS PRN
Status: DISCONTINUED | OUTPATIENT
Start: 2021-10-06 | End: 2021-10-06 | Stop reason: SDUPTHER

## 2021-10-06 RX ORDER — LIDOCAINE HYDROCHLORIDE 20 MG/ML
INJECTION, SOLUTION INTRAVENOUS PRN
Status: DISCONTINUED | OUTPATIENT
Start: 2021-10-06 | End: 2021-10-06 | Stop reason: SDUPTHER

## 2021-10-06 RX ORDER — CEFAZOLIN SODIUM 1 G/3ML
INJECTION, POWDER, FOR SOLUTION INTRAMUSCULAR; INTRAVENOUS PRN
Status: DISCONTINUED | OUTPATIENT
Start: 2021-10-06 | End: 2021-10-06 | Stop reason: SDUPTHER

## 2021-10-06 RX ORDER — ERYTHROMYCIN LACTOBIONATE 500 MG/10ML
500 INJECTION, POWDER, LYOPHILIZED, FOR SOLUTION INTRAVENOUS EVERY 6 HOURS SCHEDULED
Status: DISCONTINUED | OUTPATIENT
Start: 2021-10-06 | End: 2021-10-06

## 2021-10-06 RX ORDER — MORPHINE SULFATE 2 MG/ML
2 INJECTION, SOLUTION INTRAMUSCULAR; INTRAVENOUS EVERY 4 HOURS PRN
Status: DISCONTINUED | OUTPATIENT
Start: 2021-10-06 | End: 2021-10-08 | Stop reason: HOSPADM

## 2021-10-06 RX ADMIN — INSULIN LISPRO 2 UNITS: 100 INJECTION, SOLUTION INTRAVENOUS; SUBCUTANEOUS at 18:25

## 2021-10-06 RX ADMIN — PROPOFOL 50 MG: 10 INJECTION, EMULSION INTRAVENOUS at 15:05

## 2021-10-06 RX ADMIN — PANTOPRAZOLE SODIUM 40 MG: 40 INJECTION, POWDER, FOR SOLUTION INTRAVENOUS at 17:18

## 2021-10-06 RX ADMIN — LIDOCAINE HYDROCHLORIDE 50 MG: 20 INJECTION, SOLUTION INTRAVENOUS at 15:02

## 2021-10-06 RX ADMIN — PROPOFOL 50 MG: 10 INJECTION, EMULSION INTRAVENOUS at 15:08

## 2021-10-06 RX ADMIN — SODIUM CHLORIDE, PRESERVATIVE FREE 10 ML: 5 INJECTION INTRAVENOUS at 05:38

## 2021-10-06 RX ADMIN — POTASSIUM CHLORIDE: 2 INJECTION, SOLUTION, CONCENTRATE INTRAVENOUS at 18:24

## 2021-10-06 RX ADMIN — SODIUM CHLORIDE, PRESERVATIVE FREE 10 ML: 5 INJECTION INTRAVENOUS at 17:18

## 2021-10-06 RX ADMIN — PROPOFOL 50 MG: 10 INJECTION, EMULSION INTRAVENOUS at 15:17

## 2021-10-06 RX ADMIN — INSULIN LISPRO 2 UNITS: 100 INJECTION, SOLUTION INTRAVENOUS; SUBCUTANEOUS at 10:12

## 2021-10-06 RX ADMIN — METOCLOPRAMIDE HYDROCHLORIDE 5 MG: 5 INJECTION INTRAMUSCULAR; INTRAVENOUS at 02:08

## 2021-10-06 RX ADMIN — CEFAZOLIN 1000 MG: 1 INJECTION, POWDER, FOR SOLUTION INTRAMUSCULAR; INTRAVENOUS at 15:00

## 2021-10-06 RX ADMIN — METOCLOPRAMIDE HYDROCHLORIDE 5 MG: 5 INJECTION INTRAMUSCULAR; INTRAVENOUS at 08:35

## 2021-10-06 RX ADMIN — IPRATROPIUM BROMIDE AND ALBUTEROL SULFATE 1 AMPULE: 2.5; .5 SOLUTION RESPIRATORY (INHALATION) at 13:18

## 2021-10-06 RX ADMIN — SODIUM CHLORIDE, PRESERVATIVE FREE 300 UNITS: 5 INJECTION INTRAVENOUS at 21:33

## 2021-10-06 RX ADMIN — Medication 3 MG: at 21:23

## 2021-10-06 RX ADMIN — MORPHINE SULFATE 2 MG: 2 INJECTION, SOLUTION INTRAMUSCULAR; INTRAVENOUS at 18:24

## 2021-10-06 RX ADMIN — APIXABAN 5 MG: 5 TABLET, FILM COATED ORAL at 21:23

## 2021-10-06 RX ADMIN — DIPHENHYDRAMINE HYDROCHLORIDE 25 MG: 50 INJECTION, SOLUTION INTRAMUSCULAR; INTRAVENOUS at 21:27

## 2021-10-06 RX ADMIN — TRIMETHOBENZAMIDE HYDROCHLORIDE 200 MG: 100 INJECTION INTRAMUSCULAR at 11:57

## 2021-10-06 RX ADMIN — INSULIN LISPRO 2 UNITS: 100 INJECTION, SOLUTION INTRAVENOUS; SUBCUTANEOUS at 21:25

## 2021-10-06 RX ADMIN — SODIUM CHLORIDE: 9 INJECTION, SOLUTION INTRAVENOUS at 14:55

## 2021-10-06 RX ADMIN — SERTRALINE 25 MG: 50 TABLET, FILM COATED ORAL at 21:23

## 2021-10-06 RX ADMIN — SODIUM CHLORIDE, PRESERVATIVE FREE 10 ML: 5 INJECTION INTRAVENOUS at 08:37

## 2021-10-06 RX ADMIN — IPRATROPIUM BROMIDE AND ALBUTEROL SULFATE 1 AMPULE: 2.5; .5 SOLUTION RESPIRATORY (INHALATION) at 19:09

## 2021-10-06 RX ADMIN — DIPHENHYDRAMINE HYDROCHLORIDE 25 MG: 50 INJECTION, SOLUTION INTRAMUSCULAR; INTRAVENOUS at 02:25

## 2021-10-06 RX ADMIN — METOCLOPRAMIDE HYDROCHLORIDE 5 MG: 5 INJECTION INTRAMUSCULAR; INTRAVENOUS at 21:26

## 2021-10-06 RX ADMIN — PANTOPRAZOLE SODIUM 40 MG: 40 INJECTION, POWDER, FOR SOLUTION INTRAVENOUS at 05:37

## 2021-10-06 RX ADMIN — Medication 10 ML: at 08:35

## 2021-10-06 RX ADMIN — INSULIN LISPRO 2 UNITS: 100 INJECTION, SOLUTION INTRAVENOUS; SUBCUTANEOUS at 04:18

## 2021-10-06 RX ADMIN — PROPOFOL 100 MG: 10 INJECTION, EMULSION INTRAVENOUS at 15:00

## 2021-10-06 RX ADMIN — Medication 10 ML: at 21:27

## 2021-10-06 RX ADMIN — SODIUM CHLORIDE, PRESERVATIVE FREE 300 UNITS: 5 INJECTION INTRAVENOUS at 08:49

## 2021-10-06 RX ADMIN — MORPHINE SULFATE 2 MG: 2 INJECTION, SOLUTION INTRAMUSCULAR; INTRAVENOUS at 22:43

## 2021-10-06 ASSESSMENT — PAIN SCALES - GENERAL
PAINLEVEL_OUTOF10: 8
PAINLEVEL_OUTOF10: 8
PAINLEVEL_OUTOF10: 0

## 2021-10-06 ASSESSMENT — PAIN - FUNCTIONAL ASSESSMENT: PAIN_FUNCTIONAL_ASSESSMENT: 0-10

## 2021-10-06 NOTE — PROGRESS NOTES
Occupational Therapy  Patient treatment attempted this AM.  Patient just completed PT session. Reports she is going for surgery later and would like to rest at this time. Family present. Will attempt at a later time.   Bharat PELAEZ/SHEN [id number]

## 2021-10-06 NOTE — PROGRESS NOTES
Spoke with Thurmond Jeans, NP who wanted diet and pain medication to be recommended by Dr. Hawa Levi. Dr. Shaikh Notice was perfect served via message. Per Dr. Shaikh Notice, pt is okay for sips with meds, ice chips, and popsicles. Awaiting pain medication recommendations.

## 2021-10-06 NOTE — PROGRESS NOTES
Physical Therapy    [facility]  Treatment Note  NAME: Leonor Liang  : 1945  MRN: [id number]    Date of Service: 10/6/2021      Attending Provider:  Brea Anthony MD    Evaluating PT:  Boo Zamarripa. Courtney Gould, P.T. Room #:  9914/8338-T  Diagnosis:  Hypoxia [R09.02]  Pleural effusion, bilateral [J90]  Pertinent PMHx/PSHx:  Whipple procedure [facility] at the [facility]  Procedure/Surgery:  Thoracentesis 10/1/21  Precautions:  Falls, bed/chair alarm when someone not with pt, NG tube  Equipment Needs:  Wheeled walker    SUBJECTIVE:    Pt lives alone in a 1 story home with 3 stairs and 1 rail to enter. Pt ambulated with no AD PTA. OBJECTIVE:   Initial Evaluation  Date: 10/4/21 Treatment Short Term/ Long Term   Goals   Was pt agreeable to Eval/treatment? yes yes    Does pt have pain? No c/o pain No c/o pain    Bed Mobility  Rolling: MIN A  Supine to sit: MIN A  Sit to supine: NA  Scooting: MIN A Rolling: SBA  Supine to sit: MIN A  Sit to supine: SBA  Scooting: MIN A Independent    Transfers Sit to stand: MIN A/MOD A  Stand to sit: MIN A  Stand pivot: MIN A with ww Sit to stand: MIN A  Stand to sit: MIN A  Stand pivot: SBA with ww Independent    Ambulation   15+40 feet with ww MIN A 15+75 feet with ww  feet with ww if needed Independent    Stair negotiation: ascended and descended NA, pt c/o fatigue with amb NA, pt c/o fatigue with amb 3 steps with 1 rail Independent    AM-PAC 6 Clicks 76/54 75/98      BLE ROM is WFL. BLE strength is grossly 4-/5 to 4/5. Balance: sitting is supervision and standing with ww is SBA    ASSESSMENT:    Comments:  Pt was in bed and reports she is scheduled for PEG later today, but is willing to participate with PT at this time. She stood up with ww and reported need to use BR. She walked with ww into BR with slow gait speed, but was steady on her feet. She transferred on/off commode with MIN A using grab bar.   She stood with ww and SBA while she performed self hygiene care and then stood at sink to wash her hands. Pt walked in the hill with slow gait speed and was provided SBA for safety. She was able to increase her amb distance today prior to c/o fatigue. Pt got back to her room and asked to lie down in bed. Treatment:  Patient practiced and was instructed in the following treatment:     Bed mobility, transfers, ADLs, and gait with ww to improve functional strength and endurance. Pt was left supine in bed with call light left by patient and visitor present. PLAN:    Pt is making good progress toward established Physical Therapy goals. Continue with physical therapy current plan of care. Time in  10:10  Time out  10:35    Total Treatment Time  25 minutes     Evaluation Time includes thorough review of current medical information, gathering information on past medical history/social history and prior level of function, completion of standardized testing/informal observation of tasks, assessment of data and education on plan of care and goals. CPT codes:  [] Low Complexity PT evaluation 51424  [] Moderate Complexity PT evaluation 63818  [] High Complexity PT evaluation 35586  [] PT Re-evaluation 47898  [] Gait training 82357 ** minutes  [] Manual therapy 43468 ** minutes  [x] Therapeutic activities 70245 25 minutes  [] Therapeutic exercises 88197 ** minutes  [] Neuromuscular reeducation 22446 ** minutes     Ajith Rao., P.T.   License Number: [id number]

## 2021-10-06 NOTE — BRIEF OP NOTE
Brief Postoperative Note    Sharie Baumgarten  YOB: 1945  [id number]    Procedure: EGD with biopsy    Anesthesia: [facility]    Surgeon:  Nataliia Faulkner MD    Findings:       Esophagus:  GERD      Stomach:  Gastritis PEG PLACED WITHOUT DIFFICULTY      Duodenum:  Normal          Complications: None      Estimated blood loss: none      Ranjit Church MD

## 2021-10-06 NOTE — ANESTHESIA POSTPROCEDURE EVALUATION
Department of Anesthesiology  Postprocedure Note    Patient: Efren Johnson  MRN: [id number]  YOB: 1945  Date of evaluation: 10/6/2021  Time:  3:47 PM     Procedure Summary     Date: 10/06/21 Room / Location: [facility] 03 / 106 [facility]    Anesthesia Start: 9787 Anesthesia Stop: 9291    Procedure: EGD PEG TUBE PLACEMENT (N/A ) Diagnosis: (-)    Surgeons: Jorje Will MD Responsible Provider: Nithya Caban DO    Anesthesia Type: MAC ASA Status: 3          Anesthesia Type: MAC    Sulaiman Phase I: Sulaiman Score: 10    Sulaiman Phase II: Sulaiman Score: 10    Last vitals: Reviewed and per EMR flowsheets.        Anesthesia Post Evaluation    Patient location during evaluation: PACU  Patient participation: complete - patient participated  Level of consciousness: awake and alert  Airway patency: patent  Nausea & Vomiting: no nausea and no vomiting  Complications: no  Cardiovascular status: hemodynamically stable  Respiratory status: acceptable  Hydration status: euvolemic

## 2021-10-06 NOTE — ANESTHESIA PRE PROCEDURE
Department of Anesthesiology  Preprocedure Note       Name:  Michoacano Roger   Age:  67 y.o.  :  1945                                          MRN:  [id number]         Date:  10/6/2021      Surgeon: Kumar Beth):  Gokul Valencia MD    Procedure: Procedure(s):  EGD PEG TUBE PLACEMENT    Medications prior to admission:   Prior to Admission medications    Medication Sig Start Date End Date Taking?  Authorizing Provider   Pancrelipase, Lip-Prot-Amyl, (CREON PO) Take 2 capsules by mouth 3 times daily   Yes Historical Provider, MD   aspirin 81 MG chewable tablet Take 81 mg by mouth daily    Yes Historical Provider, MD   losartan (COZAAR) 50 MG tablet Take 50 mg by mouth every evening    Yes Historical Provider, MD   pantoprazole (PROTONIX) 40 MG tablet Take 40 mg by mouth daily 21 Yes Historical Provider, MD   sertraline (ZOLOFT) 25 MG tablet Take 25 mg by mouth every evening  3/31/21  Yes Historical Provider, MD   Multiple Vitamins-Minerals (ONE-A-DAY PROACTIVE 65+) TABS Take 1 tablet by mouth daily    Historical Provider, MD   OIL OF OREGANO PO Take 1 capsule by mouth daily    Historical Provider, MD       Current medications:    Current Facility-Administered Medications   Medication Dose Route Frequency Provider Last Rate Last Admin    PN-Adult  3 IN 1 Central Line (Standard)   IntraVENous Continuous TPN Amanda Gale MD        PN-Adult  3 IN 1 Central Line (Standard)   IntraVENous Continuous TPN Amanda Gale MD 43 mL/hr at 10/06/21 1000 Rate Change at 10/06/21 1000    bisacodyl (DULCOLAX) suppository 10 mg  10 mg Rectal Daily PRN Amanda Gale MD   10 mg at 10/05/21 1630    insulin lispro (HUMALOG) injection vial 0-6 Units  0-6 Units SubCUTAneous Q6H Alexsandra Maya MD   2 Units at 10/06/21 1012    diatrizoate meglumine-sodium (GASTROGRAFIN) 66-10 % solution 120 mL  120 mL Oral ONCE PRN Rahel Marcus MD   120 mL at 10/04/21 1259    metoclopramide (REGLAN) injection 5 mg  5 mg IntraVENous Q6H Oliver Giraldo III, MD   5 mg at 10/06/21 0835    apixaban (ELIQUIS) tablet 5 mg  5 mg Oral BID Dilcia Ernandez MD   5 mg at 10/05/21 0854    pantoprazole (PROTONIX) injection 40 mg  40 mg IntraVENous BID Alanis Haq MD   40 mg at 10/06/21 0537    And    sodium chloride (PF) 0.9 % injection 10 mL  10 mL IntraVENous BID Alanis Haq MD   10 mL at 10/06/21 0538    lidocaine PF 1 % injection 5 mL  5 mL IntraDERmal Once Alanis Haq MD        sodium chloride flush 0.9 % injection 5-40 mL  5-40 mL IntraVENous 2 times per day Alanis Haq MD   5 mL at 10/02/21 1000    sodium chloride flush 0.9 % injection 5-40 mL  5-40 mL IntraVENous PRN Alanis Haq MD        0.9 % sodium chloride infusion  25 mL IntraVENous PRN Alanis Haq MD        heparin flush 100 UNIT/ML injection 300 Units  3 mL IntraVENous 2 times per day Alanis Haq MD   300 Units at 10/06/21 0849    heparin flush 100 UNIT/ML injection 300 Units  3 mL IntraCATHeter PRN Alanis Haq MD        diphenhydrAMINE (BENADRYL) injection 25 mg  25 mg IntraVENous Q6H PRN Oliver Giraldo III, MD   25 mg at 10/06/21 0225    melatonin tablet 3 mg  3 mg Oral Nightly PRN Jose Francisco Quintana MD   3 mg at 10/05/21 2125    butamben-tetracaine-benzocaine (CETACAINE) spray 1 spray  1 spray Topical Q2H PRN Héctor Baez MD   1 spray at 10/04/21 0610    sodium chloride (PF) 0.9 % injection 10 mL  10 mL IntraVENous Daily Cielo Durand DO   10 mL at 10/06/21 0837    losartan (COZAAR) tablet 50 mg  50 mg Oral Daily GUERRERO Mckeon   50 mg at 10/05/21 0854    sertraline (ZOLOFT) tablet 25 mg  25 mg Oral Daily GUERRERO Mckeon   25 mg at 10/05/21 2125    sodium chloride flush 0.9 % injection 10 mL  10 mL IntraVENous 2 times per day GUERRERO Mckeon   10 mL at 10/06/21 0835    sodium chloride flush 0.9 % injection 10 mL  10 mL IntraVENous PRN GUERRERO Smith   10 mL at 10/04/21 0017    0.9 % sodium chloride infusion  25 mL IntraVENous PRN Buren Drop, PA        potassium chloride (KLOR-CON M) extended release tablet 40 mEq  40 mEq Oral PRN Buren Drop, PA        Or    potassium bicarb-citric acid (EFFER-K) effervescent tablet 40 mEq  40 mEq Oral PRN Buren Drop, PA        Or    potassium chloride 10 mEq/100 mL IVPB (Peripheral Line)  10 mEq IntraVENous PRN Buren Drop, PA        magnesium hydroxide (MILK OF MAGNESIA) 400 MG/5ML suspension 30 mL  30 mL Oral Daily PRN Buren Drop, PA   30 mL at 10/04/21 0913    acetaminophen (TYLENOL) tablet 650 mg  650 mg Oral Q6H PRN Buren Drop, PA        Or    acetaminophen (TYLENOL) suppository 650 mg  650 mg Rectal Q6H PRN Buren Drop, PA        trimethobenzamide Radha Floras) injection 200 mg  200 mg IntraMUSCular Q6H PRN Buren Drop, PA   200 mg at 10/06/21 1157    ipratropium-albuterol (DUONEB) nebulizer solution 1 ampule  1 ampule Inhalation Q4H WA Buren Drop, PA   1 ampule at 10/06/21 1318       Allergies:     Allergies   Allergen Reactions    Chocolate Other (See Comments)     HEARTBURN       Problem List:    Patient Active Problem List   Diagnosis Code    Hypoxia R09.02    Pleural effusion J90    Delayed gastric emptying K30    IPMN (intraductal papillary mucinous neoplasm) D49.0       Past Medical History:        Diagnosis Date    Depression     GERD (gastroesophageal reflux disease)     Hypertension        Past Surgical History:        Procedure Laterality Date    PANCREAS SURGERY  09/09/2021    Pylorus-Preserving Pancreaticoduodenectomy at [facility] by Dr. Shelley Miles       Social History:    Social History     Tobacco Use    Smoking status: Never Smoker    Smokeless tobacco: Never Used   Substance Use Topics    Alcohol use: Not Currently                                Counseling given: Not Answered      Vital Signs (Current):   Vitals:    10/06/21 1252 10/06/21 1258 10/06/21 1318 Anesthesia Evaluation  Patient summary reviewed no history of anesthetic complications:   Airway: Mallampati: II  TM distance: <3 FB   Neck ROM: full  Mouth opening: < 3 FB Dental:    (+) upper dentures  Comment: 2 lower pegs for dentures    Pulmonary: breath sounds clear to auscultation                             Cardiovascular:    (+) hypertension:,         Rhythm: regular             Beta Blocker:  Not on Beta Blocker         Neuro/Psych:   (+) psychiatric history:            GI/Hepatic/Renal:   (+) GERD:,          ROS comment: Delayed gastric emptying   NG in place . Endo/Other:                     Abdominal:             Vascular: Other Findings:             Anesthesia Plan      MAC     ASA 3       Induction: intravenous. MIPS: Prophylactic antiemetics administered. Anesthetic plan and risks discussed with patient. Plan discussed with CRNA.             [address],    10/6/2021

## 2021-10-06 NOTE — CARE COORDINATION
Per note review - patient schedule for decompressive PEG today. Plan is for home on discharge. Received notification that patient was active with [facility] just prior to hospital stay for PT services only. Will need new HHC order to address nursing needs. [facility] will review for capacity to resume care in home. Will follow along with  and assist with discharge planning as necessary. Mark Dodd.  Melo, MSN, RN  [facility] Case Management  [phone]

## 2021-10-06 NOTE — PROGRESS NOTES
Unable to verify/titrate if needed the TPN. Pt is down in Endo. Electronically signed by Monse Mandujano RN on 10/6/2021 at 2:00PM    Pt is still not available and down in Endo/OR. Unable to titrate or verify TPN rate.  Electronically signed by Monse Mandujano RN on 10/6/2021 at 4:00PM

## 2021-10-06 NOTE — PROGRESS NOTES
Pt for EGD/PEG today with Dr. Fabiana Martines. Sister in law at bedside, all additional questions answered. Labs reviewed, assessment unchanged. Pt stable for procedure.    Renetta Sloan [id number], FNP-BC 10/6/2021 9:30 AM

## 2021-10-07 LAB
ALBUMIN SERPL-MCNC: 3.1 G/DL (ref 3.5–5.2)
ALP BLD-CCNC: 119 U/L (ref 35–104)
ALT SERPL-CCNC: 18 U/L (ref 0–32)
ANION GAP SERPL CALCULATED.3IONS-SCNC: 10 MMOL/L (ref 7–16)
AST SERPL-CCNC: 16 U/L (ref 0–31)
BASOPHILS ABSOLUTE: 0.03 E9/L (ref 0–0.2)
BASOPHILS RELATIVE PERCENT: 0.4 % (ref 0–2)
BILIRUB SERPL-MCNC: 0.4 MG/DL (ref 0–1.2)
BUN BLDV-MCNC: 25 MG/DL (ref 6–23)
CALCIUM SERPL-MCNC: 9 MG/DL (ref 8.6–10.2)
CHLORIDE BLD-SCNC: 108 MMOL/L (ref 98–107)
CO2: 27 MMOL/L (ref 22–29)
CREAT SERPL-MCNC: 0.6 MG/DL (ref 0.5–1)
EOSINOPHILS ABSOLUTE: 0.06 E9/L (ref 0.05–0.5)
EOSINOPHILS RELATIVE PERCENT: 0.9 % (ref 0–6)
GFR AFRICAN AMERICAN: >60
GFR NON-AFRICAN AMERICAN: >60 ML/MIN/1.73
GLUCOSE BLD-MCNC: 225 MG/DL (ref 74–99)
HBA1C MFR BLD: 6.3 % (ref 4–5.6)
HCT VFR BLD CALC: 34.7 % (ref 34–48)
HEMOGLOBIN: 10.3 G/DL (ref 11.5–15.5)
IMMATURE GRANULOCYTES #: 0.04 E9/L
IMMATURE GRANULOCYTES %: 0.6 % (ref 0–5)
LYMPHOCYTES ABSOLUTE: 1.15 E9/L (ref 1.5–4)
LYMPHOCYTES RELATIVE PERCENT: 17 % (ref 20–42)
MCH RBC QN AUTO: 26.7 PG (ref 26–35)
MCHC RBC AUTO-ENTMCNC: 29.7 % (ref 32–34.5)
MCV RBC AUTO: 89.9 FL (ref 80–99.9)
METER GLUCOSE: 210 MG/DL (ref 74–99)
METER GLUCOSE: 236 MG/DL (ref 74–99)
METER GLUCOSE: 241 MG/DL (ref 74–99)
MONOCYTES ABSOLUTE: 0.48 E9/L (ref 0.1–0.95)
MONOCYTES RELATIVE PERCENT: 7.1 % (ref 2–12)
NEUTROPHILS ABSOLUTE: 5 E9/L (ref 1.8–7.3)
NEUTROPHILS RELATIVE PERCENT: 74 % (ref 43–80)
PDW BLD-RTO: 14.4 FL (ref 11.5–15)
PLATELET # BLD: 325 E9/L (ref 130–450)
PMV BLD AUTO: 10.3 FL (ref 7–12)
POTASSIUM SERPL-SCNC: 4.7 MMOL/L (ref 3.5–5)
RBC # BLD: 3.86 E12/L (ref 3.5–5.5)
SODIUM BLD-SCNC: 145 MMOL/L (ref 132–146)
TOTAL PROTEIN: 6.1 G/DL (ref 6.4–8.3)
WBC # BLD: 6.8 E9/L (ref 4.5–11.5)

## 2021-10-07 PROCEDURE — 36415 COLL VENOUS BLD VENIPUNCTURE: CPT

## 2021-10-07 PROCEDURE — C9113 INJ PANTOPRAZOLE SODIUM, VIA: HCPCS | Performed by: INTERNAL MEDICINE

## 2021-10-07 PROCEDURE — 6360000002 HC RX W HCPCS: Performed by: TRANSPLANT SURGERY

## 2021-10-07 PROCEDURE — 6370000000 HC RX 637 (ALT 250 FOR IP): Performed by: INTERNAL MEDICINE

## 2021-10-07 PROCEDURE — 2580000003 HC RX 258: Performed by: INTERNAL MEDICINE

## 2021-10-07 PROCEDURE — 6360000002 HC RX W HCPCS: Performed by: CLINICAL NURSE SPECIALIST

## 2021-10-07 PROCEDURE — 6360000002 HC RX W HCPCS: Performed by: INTERNAL MEDICINE

## 2021-10-07 PROCEDURE — 97530 THERAPEUTIC ACTIVITIES: CPT

## 2021-10-07 PROCEDURE — 2580000003 HC RX 258: Performed by: CLINICAL NURSE SPECIALIST

## 2021-10-07 PROCEDURE — 94640 AIRWAY INHALATION TREATMENT: CPT

## 2021-10-07 PROCEDURE — 80053 COMPREHEN METABOLIC PANEL: CPT

## 2021-10-07 PROCEDURE — 83036 HEMOGLOBIN GLYCOSYLATED A1C: CPT

## 2021-10-07 PROCEDURE — 1200000000 HC SEMI PRIVATE

## 2021-10-07 PROCEDURE — 85025 COMPLETE CBC W/AUTO DIFF WBC: CPT

## 2021-10-07 PROCEDURE — 2500000003 HC RX 250 WO HCPCS

## 2021-10-07 PROCEDURE — 99232 SBSQ HOSP IP/OBS MODERATE 35: CPT | Performed by: TRANSPLANT SURGERY

## 2021-10-07 PROCEDURE — 82962 GLUCOSE BLOOD TEST: CPT

## 2021-10-07 RX ORDER — METOCLOPRAMIDE HYDROCHLORIDE 5 MG/ML
10 INJECTION INTRAMUSCULAR; INTRAVENOUS DAILY
Status: DISCONTINUED | OUTPATIENT
Start: 2021-10-08 | End: 2021-10-08 | Stop reason: HOSPADM

## 2021-10-07 RX ORDER — IPRATROPIUM BROMIDE AND ALBUTEROL SULFATE 2.5; .5 MG/3ML; MG/3ML
1 SOLUTION RESPIRATORY (INHALATION) EVERY 4 HOURS PRN
Status: DISCONTINUED | OUTPATIENT
Start: 2021-10-07 | End: 2021-10-08 | Stop reason: HOSPADM

## 2021-10-07 RX ADMIN — MORPHINE SULFATE 2 MG: 2 INJECTION, SOLUTION INTRAMUSCULAR; INTRAVENOUS at 12:26

## 2021-10-07 RX ADMIN — SODIUM CHLORIDE, PRESERVATIVE FREE 10 ML: 5 INJECTION INTRAVENOUS at 08:38

## 2021-10-07 RX ADMIN — LOSARTAN POTASSIUM 50 MG: 50 TABLET, FILM COATED ORAL at 08:24

## 2021-10-07 RX ADMIN — METOCLOPRAMIDE HYDROCHLORIDE 5 MG: 5 INJECTION INTRAMUSCULAR; INTRAVENOUS at 08:24

## 2021-10-07 RX ADMIN — PANTOPRAZOLE SODIUM 40 MG: 40 INJECTION, POWDER, FOR SOLUTION INTRAVENOUS at 16:40

## 2021-10-07 RX ADMIN — MORPHINE SULFATE 2 MG: 2 INJECTION, SOLUTION INTRAMUSCULAR; INTRAVENOUS at 02:52

## 2021-10-07 RX ADMIN — APIXABAN 5 MG: 5 TABLET, FILM COATED ORAL at 20:52

## 2021-10-07 RX ADMIN — IPRATROPIUM BROMIDE AND ALBUTEROL SULFATE 1 AMPULE: 2.5; .5 SOLUTION RESPIRATORY (INHALATION) at 07:59

## 2021-10-07 RX ADMIN — APIXABAN 5 MG: 5 TABLET, FILM COATED ORAL at 08:24

## 2021-10-07 RX ADMIN — SODIUM CHLORIDE, PRESERVATIVE FREE 10 ML: 5 INJECTION INTRAVENOUS at 16:40

## 2021-10-07 RX ADMIN — SODIUM CHLORIDE, PRESERVATIVE FREE 10 ML: 5 INJECTION INTRAVENOUS at 05:33

## 2021-10-07 RX ADMIN — POTASSIUM CHLORIDE: 2 INJECTION, SOLUTION, CONCENTRATE INTRAVENOUS at 18:26

## 2021-10-07 RX ADMIN — Medication 10 ML: at 20:53

## 2021-10-07 RX ADMIN — MORPHINE SULFATE 2 MG: 2 INJECTION, SOLUTION INTRAMUSCULAR; INTRAVENOUS at 16:40

## 2021-10-07 RX ADMIN — INSULIN LISPRO 2 UNITS: 100 INJECTION, SOLUTION INTRAVENOUS; SUBCUTANEOUS at 10:07

## 2021-10-07 RX ADMIN — MORPHINE SULFATE 2 MG: 2 INJECTION, SOLUTION INTRAMUSCULAR; INTRAVENOUS at 08:25

## 2021-10-07 RX ADMIN — IPRATROPIUM BROMIDE AND ALBUTEROL SULFATE 1 AMPULE: 2.5; .5 SOLUTION RESPIRATORY (INHALATION) at 15:58

## 2021-10-07 RX ADMIN — MORPHINE SULFATE 2 MG: 2 INJECTION, SOLUTION INTRAMUSCULAR; INTRAVENOUS at 20:53

## 2021-10-07 RX ADMIN — INSULIN LISPRO 2 UNITS: 100 INJECTION, SOLUTION INTRAVENOUS; SUBCUTANEOUS at 23:56

## 2021-10-07 RX ADMIN — SODIUM CHLORIDE, PRESERVATIVE FREE 300 UNITS: 5 INJECTION INTRAVENOUS at 08:28

## 2021-10-07 RX ADMIN — SERTRALINE 25 MG: 50 TABLET, FILM COATED ORAL at 20:53

## 2021-10-07 RX ADMIN — INSULIN LISPRO 2 UNITS: 100 INJECTION, SOLUTION INTRAVENOUS; SUBCUTANEOUS at 04:17

## 2021-10-07 RX ADMIN — PANTOPRAZOLE SODIUM 40 MG: 40 INJECTION, POWDER, FOR SOLUTION INTRAVENOUS at 05:33

## 2021-10-07 RX ADMIN — ERYTHROMYCIN LACTOBIONATE 500 MG: 500 INJECTION, POWDER, LYOPHILIZED, FOR SOLUTION INTRAVENOUS at 12:29

## 2021-10-07 RX ADMIN — Medication 3 MG: at 23:59

## 2021-10-07 RX ADMIN — Medication 5 ML: at 08:45

## 2021-10-07 RX ADMIN — METOCLOPRAMIDE HYDROCHLORIDE 5 MG: 5 INJECTION INTRAMUSCULAR; INTRAVENOUS at 02:52

## 2021-10-07 RX ADMIN — SODIUM CHLORIDE, PRESERVATIVE FREE 300 UNITS: 5 INJECTION INTRAVENOUS at 20:52

## 2021-10-07 RX ADMIN — Medication 10 ML: at 08:28

## 2021-10-07 RX ADMIN — INSULIN LISPRO 2 UNITS: 100 INJECTION, SOLUTION INTRAVENOUS; SUBCUTANEOUS at 16:40

## 2021-10-07 RX ADMIN — ERYTHROMYCIN LACTOBIONATE 500 MG: 500 INJECTION, POWDER, LYOPHILIZED, FOR SOLUTION INTRAVENOUS at 18:26

## 2021-10-07 ASSESSMENT — PAIN - FUNCTIONAL ASSESSMENT: PAIN_FUNCTIONAL_ASSESSMENT: PREVENTS OR INTERFERES SOME ACTIVE ACTIVITIES AND ADLS

## 2021-10-07 ASSESSMENT — PAIN DESCRIPTION - PAIN TYPE: TYPE: ACUTE PAIN;SURGICAL PAIN

## 2021-10-07 ASSESSMENT — PAIN DESCRIPTION - DESCRIPTORS: DESCRIPTORS: CONSTANT;ACHING;DISCOMFORT

## 2021-10-07 ASSESSMENT — PAIN DESCRIPTION - LOCATION: LOCATION: ABDOMEN

## 2021-10-07 ASSESSMENT — PAIN DESCRIPTION - PROGRESSION: CLINICAL_PROGRESSION: NOT CHANGED

## 2021-10-07 ASSESSMENT — PAIN SCALES - GENERAL
PAINLEVEL_OUTOF10: 0
PAINLEVEL_OUTOF10: 7

## 2021-10-07 ASSESSMENT — PAIN DESCRIPTION - FREQUENCY: FREQUENCY: CONTINUOUS

## 2021-10-07 ASSESSMENT — PAIN DESCRIPTION - ONSET: ONSET: ON-GOING

## 2021-10-07 NOTE — PROGRESS NOTES
10/7/2021  1:39 PM      Comprehensive Nutrition Assessment    Type and Reason for Visit:  Reassess    Nutrition Recommendations/Plan: Recommend increase rate of TPN to more closely meet pt needs, as she will be NPO/PEG for decompression as discharge plan as well:    TPN RECOMMENDATION: Standard 3-in-1 65 ml/hr (1560 ml/d)  This will provide: 1615 imani, 78 g pro  This regimen will meet 100% calorie and protein needs     Nutrition Assessment:  Pt currently s/p decompressive PEG 10/6. NGT out now PEG in. Pt remains on TPN for nutrition support. Malnutrition Assessment:  Malnutrition Status:  Insufficient data    Context:  Acute Illness     Findings of the 6 clinical characteristics of malnutrition:  Energy Intake:  7 - 50% or less of estimated energy requirements for 5 or more days  Weight Loss:  Unable to assess (2/2 poor EMR wt hx pta)     Body Fat Loss:  Unable to assess     Muscle Mass Loss:  Unable to assess    Fluid Accumulation:  No significant fluid accumulation     Strength:  Not Performed    Estimated Daily Nutrient Needs:  Energy (kcal):  ; Weight Used for Energy Requirements:  Admission     Protein (g):  75-90 (1.3-1.5 g/kg);  Weight Used for Protein Requirements:  Ideal        Fluid (ml/day):  ; Method Used for Fluid Requirements:  1 ml/kcal      Nutrition Related Findings:  A&Ox4, round abd hypo BS, no edema, -I/O 5L      Wounds:  Surgical Incision (abdominal incision)       Current Nutrition Therapies:    Current Parenteral Nutrition Orders:  · Type and Formula: 3-in-1 Standard   · Duration: Continuous  · Rate/Volume: 41.7 ml/hr = 1000 ml/d  · Current PN Order Provides: 1035 imani, 50 g pro      Anthropometric Measures:  · Height: 5' 7\" (170.2 cm)  · Current Body Weight: 184 lb (83.5 kg) (10/7 bedscale)   · Admission Body Weight: 180 lb (81.6 kg) (bed 10/1)    · Usual Body Weight:  (UTO UBW 2/2 poor EMR wt hx pta)     · Ideal Body Weight: 135 lbs; % Ideal Body Weight 136.3 %   · BMI: 28.8  · Adjusted Body Weight:  ; No Adjustment   · Adjusted BMI:      · BMI Categories: Overweight (BMI 25.0-29. 9)       Nutrition Diagnosis:   · Inadequate oral intake related to altered GI function (2/2 h/o Post-op Ileus/PNA w/ IPMN and Delayed Gastric Emptying) as evidenced by intake 26-50%, poor intake prior to admission, weight loss, GI abnormality, nausea, vomiting      Nutrition Interventions:   Food and/or Nutrient Delivery:  Continue NPO, Modify Parenteral Nutrition (Recommend increase rate to more closely meet needs)  Nutrition Education/Counseling:  Education not indicated   Coordination of Nutrition Care:  Continue to monitor while inpatient    Goals:  Pt анна PN to meet needs       Nutrition Monitoring and Evaluation:   Behavioral-Environmental Outcomes:  None Identified   Food/Nutrient Intake Outcomes:  Parenteral Nutrition Intake/Tolerance  Physical Signs/Symptoms Outcomes:  Biochemical Data, GI Status, Nausea or Vomiting, Fluid Status or Edema, Nutrition Focused Physical Findings, Skin, Weight     Discharge Planning:     Too soon to determine     Electronically signed by Shahbaz Ramirez RD, CNSC, LD on 10/7/21 at 1:39 PM EDT    Contact: [phone]

## 2021-10-07 NOTE — PROGRESS NOTES
Subjective: The patient is awake and alert. Feels and looks a lot better. No acute events overnight. Denies chest pain, angina, SOB       Objective:    BP (!) 168/88 Comment: manual  Pulse 91   Temp 98 °F (36.7 °C) (Oral)   Resp 16   Ht 5' 7\" (1.702 m)   Wt 184 lb (83.5 kg)   SpO2 93%   BMI 28.82 kg/m²     In: 1256 [I.V.:300]  Out: 1100   In: 1256   Out: 1100 [Urine:750]    General appearance: NAD, conversant  HEENT: AT/NC, MMM- NG remains in place   Neck: FROM, supple  Lungs: diminished  CV: RRR, no MRGs  Vasc: Radial pulses 2+  Abdomen: Soft, non-tender; no masses or HSM, hypoactive BS, distended, has PEG now   Extremities: No peripheral edema or digital cyanosis  Skin: no rash, lesions or ulcers   Psych: Alert and oriented to person, place and time  Neuro: Alert and interactive     Recent Labs     10/05/21  0401 10/06/21  0543 10/07/21  0300   WBC 4.9 5.7 6.8   HGB 9.9* 9.8* 10.3*   HCT 32.7* 32.1* 34.7    327 325       Recent Labs     10/05/21  0401 10/06/21  0543 10/07/21  0300    146 145   K 4.6 4.0 4.7    107 108*   CO2 29 30* 27   BUN 19 22 25*   CREATININE 0.5 0.6 0.6   CALCIUM 8.9 9.3 9.0       Assessment:    Principal Problem:    Hypoxia  Active Problems:    Pleural effusion    Delayed gastric emptying    IPMN (intraductal papillary mucinous neoplasm)  Resolved Problems:    * No resolved hospital problems. *      Plan:    Patient is a 67year old female with a PMH of depression, GERD and hypertension who presents to the emergency room for SOB and nausea and vomiting.     SOB d/t pleural effusions   - CTA Pulm: Left and right pleural effusions with the left larger than the right as well as bilateral lower lobe atelectasis.    -Monitor labs - WBC   -procal, sed rate, CRP, blood cultures pending  -Duonebs  -Incentive spirometry   - off oxygen, on room air  -Monitor O2 saturations and supplement oxygen to keep saturations greater than 93%, wean as necessary  -Left thoracentesis 10/1/21 400cc off  -D-Dimer 5,768 -? Concern for pe , initial cta negative , reconsider repeat CTA if worsening pulm status   -US bilateral lowers- Echogenic thrombus identified in the left peroneal vein- restart Eliquis tomorrow, Eliquis on hold for PEG placement    Post-op Whipple procedure  -General surgery following  -XR Abdomen- negative   -NGT discontinued 10/6  -TPN per general surgery should be able to taper off now that she has a PEG tube in place  -PICC for TPN done 10/2    -GI consulted   PEG placed 10/7/2021 -tolerating well, feels much much better        DM type 2   Worsened sugars from TPN  Start sliding scale low dose   Check a1c -pending        DVT Prophylaxis - Eliquis    PT/OT   Discharge planning - Spoke with Charge nurse regarding need [facility] set up for home TPN. Will await acceptance and plan for discharge tomorrow.      Margo Castillo, NAMRATA - CNP  12:29 PM  10/7/2021

## 2021-10-07 NOTE — PLAN OF CARE
Problem: Inadequate oral food/beverage intake (NI-2.1)  Goal: Food and/or Nutrient Delivery  Description: Individualized approach for food/nutrient provision.   10/7/2021 1553 by Beronica Juarez RD, JOAO, LD  Outcome: Met This Shift  10/7/2021 1330 by Beronica Juarez RD, JOAO, TAMIR  Outcome: Met This Shift

## 2021-10-07 NOTE — PROGRESS NOTES
bar.  She stood with ww and SBA while she performed self hygiene care and then stood at sink to wash her hands. Pt walked in the room with slow gait speed and was provided SBA for safety. She c/o pain from PEG tube that limited her amb distance at this time. Pt asked to lie down in bed after amb. Treatment:  Patient practiced and was instructed in the following treatment:     Bed mobility, transfers, ADLs, and gait with ww to improve functional strength and endurance. Pt was left supine in bed with call light left by patient and visitor present. PLAN:    Pt is making good progress toward established Physical Therapy goals. Continue with physical therapy current plan of care. Time in  09:40  Time out  10:00    Total Treatment Time  20 minutes     Evaluation Time includes thorough review of current medical information, gathering information on past medical history/social history and prior level of function, completion of standardized testing/informal observation of tasks, assessment of data and education on plan of care and goals. CPT codes:  [] Low Complexity PT evaluation 51117  [] Moderate Complexity PT evaluation 91813  [] High Complexity PT evaluation 78658  [] PT Re-evaluation 91423  [] Gait training 05896 ** minutes  [] Manual therapy 75818 ** minutes  [x] Therapeutic activities 88515 20 minutes  [] Therapeutic exercises 27160 ** minutes  [] Neuromuscular reeducation 31403 ** minutes     Ajith Madera Axon., P.T.   License Number: [id number]

## 2021-10-07 NOTE — PROGRESS NOTES
[facility] Quality Flow/Interdisciplinary Rounds Progress Note        Quality Flow Rounds held on October 7, 2021    Disciplines Attending:  Bedside Nurse, ,  and Nursing Unit [address] [facility] [place] was admitted on 9/29/2021 11:13 PM    Anticipated Discharge Date:  Expected Discharge Date: 10/08/21    Disposition:    Darren Score:  Darren Scale Score: 19    Readmission Risk              Risk of Unplanned Readmission:  15           Discussed patient goal for the day, patient clinical progression, and barriers to discharge.   The following Goal(s) of the Day/Commitment(s) have been identified:  Diagnostics - Report Results and Labs - Report Results, DC planning      Mary Sutton RN  October 7, 2021

## 2021-10-07 NOTE — PROGRESS NOTES
[facility] SURGERY  DAILY PROGRESS NOTE  10/7/2021    CC: nausea    Subjective:  Patient states that she feels so much better since having the PEG placed. Objective:  BP (!) 168/88 Comment: manual  Pulse 91   Temp 98 °F (36.7 °C) (Oral)   Resp 16   Ht 5' 7\" (1.702 m)   Wt 184 lb (83.5 kg)   SpO2 93%   BMI 28.82 kg/m²     GENERAL:  No acute distress. Alert and interactive. Oriented x3. LUNGS:  No cough. Nonlabored breathing on RA  CARDIOVASC:  Normal rate, no cyanosis. ABDOMEN:  Soft, non distended, non tender. No guarding / rigidity / rebound. NG to LIS. Incisions CDIT with minimal bruising surrounding incision  EXTREMITIES: Moves all extremities. No swelling or edema.      Assessment/Plan:  67 y.o. female female with delayed gastric emptying s/p Pylorus preserving Whipple procedure 9/9/21 at the [facility] with protracted hospitalization complicated by pneumatosis and DGE was discharged on 9/28 with return to ED on 9/29.  - please home TPN  - okay to discharge once home TPN established    Electronically signed by Autumn Sanchez MD on 10/7/2021 at 10:25 AM

## 2021-10-07 NOTE — CARE COORDINATION
Notified Jeni Pappas at [place] [address] of dc plans for tomorrow with anticipated plans for home going on TPN. Patient has PICC dated 10/2/21. TPN orders will be reviewed by [place] [address] agency. Will follow along with  and assist with discharge planning as necessary. Rima José.  Melo, MSN, RN  [facility] Case Management  [phone]

## 2021-10-08 ENCOUNTER — HOSPITAL ENCOUNTER (EMERGENCY)
Age: 76
Discharge: ANOTHER HOSPITAL | End: 2021-10-10
Attending: EMERGENCY MEDICINE
Payer: MEDICARE

## 2021-10-08 VITALS
DIASTOLIC BLOOD PRESSURE: 77 MMHG | OXYGEN SATURATION: 93 % | WEIGHT: 195.2 LBS | HEIGHT: 67 IN | RESPIRATION RATE: 16 BRPM | SYSTOLIC BLOOD PRESSURE: 140 MMHG | BODY MASS INDEX: 30.64 KG/M2 | TEMPERATURE: 97.9 F | HEART RATE: 109 BPM

## 2021-10-08 DIAGNOSIS — I95.9 HYPOTENSION, UNSPECIFIED HYPOTENSION TYPE: ICD-10-CM

## 2021-10-08 DIAGNOSIS — R73.9 HYPERGLYCEMIA: ICD-10-CM

## 2021-10-08 DIAGNOSIS — T85.528A DISLODGED GASTROSTOMY TUBE: ICD-10-CM

## 2021-10-08 DIAGNOSIS — L02.211 ABDOMINAL WALL ABSCESS: Primary | ICD-10-CM

## 2021-10-08 DIAGNOSIS — E87.5 HYPERKALEMIA: ICD-10-CM

## 2021-10-08 LAB
ALBUMIN SERPL-MCNC: 3 G/DL (ref 3.5–5.2)
ALP BLD-CCNC: 128 U/L (ref 35–104)
ALT SERPL-CCNC: 14 U/L (ref 0–32)
ANION GAP SERPL CALCULATED.3IONS-SCNC: 8 MMOL/L (ref 7–16)
AST SERPL-CCNC: 15 U/L (ref 0–31)
BASOPHILS ABSOLUTE: 0.03 E9/L (ref 0–0.2)
BASOPHILS RELATIVE PERCENT: 0.6 % (ref 0–2)
BILIRUB SERPL-MCNC: 0.3 MG/DL (ref 0–1.2)
BUN BLDV-MCNC: 24 MG/DL (ref 6–23)
BURR CELLS: ABNORMAL
CALCIUM SERPL-MCNC: 9 MG/DL (ref 8.6–10.2)
CHLORIDE BLD-SCNC: 110 MMOL/L (ref 98–107)
CO2: 29 MMOL/L (ref 22–29)
CREAT SERPL-MCNC: 0.6 MG/DL (ref 0.5–1)
EOSINOPHILS ABSOLUTE: 0.11 E9/L (ref 0.05–0.5)
EOSINOPHILS RELATIVE PERCENT: 2.1 % (ref 0–6)
GFR AFRICAN AMERICAN: >60
GFR NON-AFRICAN AMERICAN: >60 ML/MIN/1.73
GLUCOSE BLD-MCNC: 235 MG/DL (ref 74–99)
HCT VFR BLD CALC: 33.5 % (ref 34–48)
HEMOGLOBIN: 9.6 G/DL (ref 11.5–15.5)
HYPOCHROMIA: ABNORMAL
IMMATURE GRANULOCYTES #: 0.03 E9/L
IMMATURE GRANULOCYTES %: 0.6 % (ref 0–5)
LYMPHOCYTES ABSOLUTE: 1.05 E9/L (ref 1.5–4)
LYMPHOCYTES RELATIVE PERCENT: 20.4 % (ref 20–42)
MCH RBC QN AUTO: 26.2 PG (ref 26–35)
MCHC RBC AUTO-ENTMCNC: 28.7 % (ref 32–34.5)
MCV RBC AUTO: 91.3 FL (ref 80–99.9)
METER GLUCOSE: 192 MG/DL (ref 74–99)
METER GLUCOSE: 211 MG/DL (ref 74–99)
MONOCYTES ABSOLUTE: 0.42 E9/L (ref 0.1–0.95)
MONOCYTES RELATIVE PERCENT: 8.2 % (ref 2–12)
NEUTROPHILS ABSOLUTE: 3.51 E9/L (ref 1.8–7.3)
NEUTROPHILS RELATIVE PERCENT: 68.1 % (ref 43–80)
OVALOCYTES: ABNORMAL
PDW BLD-RTO: 14.5 FL (ref 11.5–15)
PLATELET # BLD: 319 E9/L (ref 130–450)
PMV BLD AUTO: 11 FL (ref 7–12)
POIKILOCYTES: ABNORMAL
POLYCHROMASIA: ABNORMAL
POTASSIUM SERPL-SCNC: 4.2 MMOL/L (ref 3.5–5)
RBC # BLD: 3.67 E12/L (ref 3.5–5.5)
SODIUM BLD-SCNC: 147 MMOL/L (ref 132–146)
TOTAL PROTEIN: 5.8 G/DL (ref 6.4–8.3)
WBC # BLD: 5.2 E9/L (ref 4.5–11.5)

## 2021-10-08 PROCEDURE — 87040 BLOOD CULTURE FOR BACTERIA: CPT

## 2021-10-08 PROCEDURE — 6370000000 HC RX 637 (ALT 250 FOR IP): Performed by: INTERNAL MEDICINE

## 2021-10-08 PROCEDURE — 99232 SBSQ HOSP IP/OBS MODERATE 35: CPT | Performed by: SURGERY

## 2021-10-08 PROCEDURE — C9113 INJ PANTOPRAZOLE SODIUM, VIA: HCPCS | Performed by: INTERNAL MEDICINE

## 2021-10-08 PROCEDURE — 6360000002 HC RX W HCPCS: Performed by: CLINICAL NURSE SPECIALIST

## 2021-10-08 PROCEDURE — 85025 COMPLETE CBC W/AUTO DIFF WBC: CPT

## 2021-10-08 PROCEDURE — 36415 COLL VENOUS BLD VENIPUNCTURE: CPT

## 2021-10-08 PROCEDURE — 83605 ASSAY OF LACTIC ACID: CPT

## 2021-10-08 PROCEDURE — 97535 SELF CARE MNGMENT TRAINING: CPT

## 2021-10-08 PROCEDURE — 6360000002 HC RX W HCPCS: Performed by: INTERNAL MEDICINE

## 2021-10-08 PROCEDURE — 80053 COMPREHEN METABOLIC PANEL: CPT

## 2021-10-08 PROCEDURE — 96365 THER/PROPH/DIAG IV INF INIT: CPT

## 2021-10-08 PROCEDURE — 97530 THERAPEUTIC ACTIVITIES: CPT

## 2021-10-08 PROCEDURE — 96361 HYDRATE IV INFUSION ADD-ON: CPT

## 2021-10-08 PROCEDURE — 94640 AIRWAY INHALATION TREATMENT: CPT

## 2021-10-08 PROCEDURE — 2580000003 HC RX 258: Performed by: CLINICAL NURSE SPECIALIST

## 2021-10-08 PROCEDURE — 81001 URINALYSIS AUTO W/SCOPE: CPT

## 2021-10-08 PROCEDURE — 96375 TX/PRO/DX INJ NEW DRUG ADDON: CPT

## 2021-10-08 PROCEDURE — 96367 TX/PROPH/DG ADDL SEQ IV INF: CPT

## 2021-10-08 PROCEDURE — 96366 THER/PROPH/DIAG IV INF ADDON: CPT

## 2021-10-08 PROCEDURE — 82962 GLUCOSE BLOOD TEST: CPT

## 2021-10-08 PROCEDURE — 6360000002 HC RX W HCPCS: Performed by: NURSE PRACTITIONER

## 2021-10-08 PROCEDURE — 6360000002 HC RX W HCPCS: Performed by: TRANSPLANT SURGERY

## 2021-10-08 PROCEDURE — 99285 EMERGENCY DEPT VISIT HI MDM: CPT

## 2021-10-08 PROCEDURE — 2580000003 HC RX 258: Performed by: INTERNAL MEDICINE

## 2021-10-08 PROCEDURE — 84484 ASSAY OF TROPONIN QUANT: CPT

## 2021-10-08 PROCEDURE — 83690 ASSAY OF LIPASE: CPT

## 2021-10-08 RX ORDER — INSULIN LISPRO 100 [IU]/ML
INJECTION, SOLUTION INTRAVENOUS; SUBCUTANEOUS
Qty: 1 EACH | Refills: 1 | Status: SHIPPED | OUTPATIENT
Start: 2021-10-08 | End: 2021-10-08 | Stop reason: HOSPADM

## 2021-10-08 RX ORDER — METOCLOPRAMIDE 10 MG/1
10 TABLET ORAL DAILY
Qty: 120 TABLET | Refills: 1 | Status: SHIPPED | OUTPATIENT
Start: 2021-10-08

## 2021-10-08 RX ORDER — 0.9 % SODIUM CHLORIDE 0.9 %
30 INTRAVENOUS SOLUTION INTRAVENOUS ONCE
Status: COMPLETED | OUTPATIENT
Start: 2021-10-08 | End: 2021-10-09

## 2021-10-08 RX ADMIN — ERYTHROMYCIN LACTOBIONATE 500 MG: 500 INJECTION, POWDER, LYOPHILIZED, FOR SOLUTION INTRAVENOUS at 12:39

## 2021-10-08 RX ADMIN — ERYTHROMYCIN LACTOBIONATE 500 MG: 500 INJECTION, POWDER, LYOPHILIZED, FOR SOLUTION INTRAVENOUS at 00:53

## 2021-10-08 RX ADMIN — APIXABAN 5 MG: 5 TABLET, FILM COATED ORAL at 09:32

## 2021-10-08 RX ADMIN — MORPHINE SULFATE 2 MG: 2 INJECTION, SOLUTION INTRAMUSCULAR; INTRAVENOUS at 00:53

## 2021-10-08 RX ADMIN — PANTOPRAZOLE SODIUM 40 MG: 40 INJECTION, POWDER, FOR SOLUTION INTRAVENOUS at 06:11

## 2021-10-08 RX ADMIN — MORPHINE SULFATE 2 MG: 2 INJECTION, SOLUTION INTRAMUSCULAR; INTRAVENOUS at 06:11

## 2021-10-08 RX ADMIN — METOCLOPRAMIDE 10 MG: 5 INJECTION, SOLUTION INTRAMUSCULAR; INTRAVENOUS at 09:31

## 2021-10-08 RX ADMIN — SODIUM CHLORIDE, PRESERVATIVE FREE 10 ML: 5 INJECTION INTRAVENOUS at 06:12

## 2021-10-08 RX ADMIN — SODIUM CHLORIDE, PRESERVATIVE FREE 300 UNITS: 5 INJECTION INTRAVENOUS at 09:32

## 2021-10-08 RX ADMIN — MORPHINE SULFATE 2 MG: 2 INJECTION, SOLUTION INTRAMUSCULAR; INTRAVENOUS at 09:31

## 2021-10-08 RX ADMIN — LOSARTAN POTASSIUM 50 MG: 50 TABLET, FILM COATED ORAL at 09:32

## 2021-10-08 RX ADMIN — MORPHINE SULFATE 2 MG: 2 INJECTION, SOLUTION INTRAMUSCULAR; INTRAVENOUS at 13:45

## 2021-10-08 RX ADMIN — INSULIN LISPRO 2 UNITS: 100 INJECTION, SOLUTION INTRAVENOUS; SUBCUTANEOUS at 06:19

## 2021-10-08 RX ADMIN — ERYTHROMYCIN LACTOBIONATE 500 MG: 500 INJECTION, POWDER, LYOPHILIZED, FOR SOLUTION INTRAVENOUS at 06:12

## 2021-10-08 RX ADMIN — INSULIN LISPRO 1 UNITS: 100 INJECTION, SOLUTION INTRAVENOUS; SUBCUTANEOUS at 11:07

## 2021-10-08 ASSESSMENT — PAIN DESCRIPTION - PROGRESSION: CLINICAL_PROGRESSION: NOT CHANGED

## 2021-10-08 ASSESSMENT — PAIN DESCRIPTION - ORIENTATION
ORIENTATION: LEFT
ORIENTATION: MID

## 2021-10-08 ASSESSMENT — PAIN SCALES - GENERAL
PAINLEVEL_OUTOF10: 8
PAINLEVEL_OUTOF10: 8
PAINLEVEL_OUTOF10: 6
PAINLEVEL_OUTOF10: 0
PAINLEVEL_OUTOF10: 8
PAINLEVEL_OUTOF10: 0
PAINLEVEL_OUTOF10: 7
PAINLEVEL_OUTOF10: 6

## 2021-10-08 ASSESSMENT — PAIN DESCRIPTION - FREQUENCY: FREQUENCY: INTERMITTENT

## 2021-10-08 ASSESSMENT — PAIN DESCRIPTION - LOCATION
LOCATION: ABDOMEN
LOCATION: ABDOMEN

## 2021-10-08 ASSESSMENT — PAIN DESCRIPTION - DESCRIPTORS: DESCRIPTORS: ACHING;DISCOMFORT

## 2021-10-08 ASSESSMENT — PAIN DESCRIPTION - PAIN TYPE
TYPE: ACUTE PAIN
TYPE: ACUTE PAIN;SURGICAL PAIN

## 2021-10-08 ASSESSMENT — PAIN DESCRIPTION - ONSET: ONSET: ON-GOING

## 2021-10-08 ASSESSMENT — PAIN - FUNCTIONAL ASSESSMENT: PAIN_FUNCTIONAL_ASSESSMENT: PREVENTS OR INTERFERES SOME ACTIVE ACTIVITIES AND ADLS

## 2021-10-08 NOTE — PROGRESS NOTES
Subjective: The patient is awake and alert. Excited for discharge today. No acute events overnight. Denies chest pain, angina, SOB       Objective:    BP (!) 140/77   Pulse 109   Temp 97.9 °F (36.6 °C) (Oral)   Resp 16   Ht 5' 7\" (1.702 m)   Wt 195 lb 3.2 oz (88.5 kg)   SpO2 93%   BMI 30.57 kg/m²     In: 1075 [P.O.:300]  Out: 1325   In: 1075   Out: 1325 [Urine:400]    General appearance: NAD, conversant  HEENT: AT/NC, MMM- NG remains in place   Neck: FROM, supple  Lungs: diminished  CV: RRR, no MRGs  Vasc: Radial pulses 2+  Abdomen: Soft, non-tender; no masses or HSM, hypoactive BS, distended, has PEG now   Extremities: No peripheral edema or digital cyanosis  Skin: no rash, lesions or ulcers   Psych: Alert and oriented to person, place and time  Neuro: Alert and interactive     Recent Labs     10/06/21  0543 10/07/21  0300 10/08/21  0624   WBC 5.7 6.8 5.2   HGB 9.8* 10.3* 9.6*   HCT 32.1* 34.7 33.5*    325 319       Recent Labs     10/06/21  0543 10/07/21  0300 10/08/21  0624    145 147*   K 4.0 4.7 4.2    108* 110*   CO2 30* 27 29   BUN 22 25* 24*   CREATININE 0.6 0.6 0.6   CALCIUM 9.3 9.0 9.0       Assessment:    Principal Problem:    Hypoxia  Active Problems:    Pleural effusion    Delayed gastric emptying    IPMN (intraductal papillary mucinous neoplasm)  Resolved Problems:    * No resolved hospital problems. *      Plan:    Patient is a 67year old female with a PMH of depression, GERD and hypertension who presents to the emergency room for SOB and nausea and vomiting. SOB d/t pleural effusions   - CTA Pulm: Left and right pleural effusions with the left larger than the right as well as bilateral lower lobe atelectasis.    -Monitor labs - WBC   -procal, sed rate, CRP, blood cultures pending  -Duonebs  -Incentive spirometry   - off oxygen, on room air  -Monitor O2 saturations and supplement oxygen to keep saturations greater than 93%, wean as necessary  -Left thoracentesis 10/1/21 400cc off  -D-Dimer 3,571 -? Concern for pe , initial cta negative , reconsider repeat CTA if worsening pulm status   -US bilateral lowers- Echogenic thrombus identified in the left peroneal vein- Eliquis    Post-op Whipple procedure  -General surgery following  -XR Abdomen- negative   -NGT discontinued 10/6  -TPN per general surgery will discharge with TPN   -PICC for TPN done 10/2    -GI consulted   -PEG placed 10/7/2021 -tolerating well, feels much much better      DM type 2   Worsened sugars from TPN  Start sliding scale low dose   Check a1c -pending        DVT Prophylaxis - Eliquis    PT/OT   Discharge planning - awaiting final word on [address] for TPN on discharge, once finalized patient can be discharged. NAMRATA Tovar CNP  10:30 AM  10/8/2021     Above note edited to reflect my thoughts     I personally saw, examined and provided care for the patient. Radiographs, labs and medication list were reviewed by me independently. The case was discussed in detail and plans for care were established. Review of GITA Tovar   , documentation was conducted and revisions were made as appropriate directly by me. I agree with the above documented exam, problem list, and plan of care.      Viet Ureña MD  6:30 AM  10/9/2021

## 2021-10-08 NOTE — DISCHARGE SUMMARY
Physician Discharge Summary     Patient ID:  Steve Lundberg  [id number]  37 y.o.  1945    Admit date: 9/29/2021    Discharge date and time: 10/8/2021    Admission Diagnoses:   Patient Active Problem List   Diagnosis    Hypoxia    Pleural effusion    Delayed gastric emptying    IPMN (intraductal papillary mucinous neoplasm)       Discharge Diagnoses: delayed gastric emptying     Consults: pulmonary/intensive care, GI and general surgery    Procedures: left thoracentesis 10/1/21    Hospital Course: The patient is a 67 y.o. female of Melissa Cueva MD with significant past medical history of depression, GERD and hypertension who presents to the emergency room for SOB and nausea and vomiting. SOB d/t pleural effusions   - CTA Pulm: Left and right pleural effusions with the left larger than the right as well as bilateral lower lobe atelectasis. -Monitor labs - WBC   -procal, sed rate, CRP, blood cultures pending  -Duonebs  -Incentive spirometry   - off oxygen, on room air  -Monitor O2 saturations and supplement oxygen to keep saturations greater than 93%, wean as necessary  -Left thoracentesis 10/1/21 400cc off  -D-Dimer 4,356 -? Concern for pe , initial cta negative , reconsider repeat CTA if worsening pulm status   -US bilateral lowers- Echogenic thrombus identified in the left peroneal vein- Eliquis     Post-op Whipple procedure  -General surgery following  -XR Abdomen- negative   -NGT discontinued 10/6  -TPN per general surgery will discharge with TPN   -PICC for TPN done 10/2    -GI consulted   -PEG placed 10/7/2021 -tolerating well, feels much much better        DM type 2   Worsened sugars from TPN  Start sliding scale low dose   Check a1c -pending          DVT Prophylaxis - Eliquis    PT/OT   Discharge planning - awaiting final word on [address] for TPN on discharge, once finalized patient can be discharged.         Recent Labs     10/06/21  0543 10/07/21  0300 10/08/21  0624   WBC 5.7 6.8 5.2   HGB 9.8* Lungs/pleura: Small left pleural effusion and tiny right pleural effusion. Moderate left lower lobe atelectasis and minimal right lower lobe dependent atelectasis. A somewhat ill-defined or irregular opacity in the right lung apex measures 1.1 x 1.6 x 0.6 cm. Scattered calcifications, nonspecific but suggestive of old granulomatous disease. Tiny foci of subpleural nodularity in the right lower lobe measuring 2 or 3 mm in size. There is also a 3 mm subpleural left upper lobe nodule laterally. Upper Abdomen: Small hiatal hernia. Partial visualization of an apparent lipoma along the medial aspect of the stomach measuring 2.9 cm. This could be followed as indicated. Soft Tissues/Bones: Degenerative changes are scattered in the spine. Bilateral breast implants. No identified pulmonary embolism. Left greater than right pleural effusions with left greater than right lower lobe atelectasis. Irregular opacity in the right lung apex may represent an area of scarring or fibrosis. Neoplastic process is difficult to exclude. If no previous study is available for comparison, further characterization with PET scan would be recommended. Alternatively, follow-up CT in 3 months if PET scan cannot be obtained. Tiny areas of subpleural nodularity bilaterally are favored to be incidental but could also be followed on subsequent exams. XR ABDOMEN (2 VIEWS)    Result Date: 9/30/2021  EXAMINATION: 3 XRAY VIEWS OF THE ABDOMEN 9/30/2021 7:03 am COMPARISON: None. HISTORY: ORDERING SYSTEM PROVIDED HISTORY: abd pain TECHNOLOGIST PROVIDED HISTORY: Reason for exam:->abd pain FINDINGS: EKG leads overlie the lower chest and the abdomen. Nonspecific nonobstructive bowel gas pattern. There is some contrast in the kidneys, ureters and urinary bladder from CT performed earlier today. Degenerative changes are present in the spine and pelvis. Small pleural effusions at the base of the chest.     Nonspecific abdomen.        Discharge Exam:    HEENT: NCAT,  PERRLA, No JVD  Heart:  RRR, no murmurs, gallops, or rubs. Lungs:  CTA bilaterally, no wheeze, rales or rhonchi  Abd: bowel sounds present, nontender, nondistended, no masses  Extrem:  No clubbing, cyanosis, or edema    Disposition: home     Patient Condition at Discharge: Stable    Patient Instructions:      Medication List        START taking these medications      apixaban 5 MG Tabs tablet  Commonly known as: ELIQUIS  Take 1 tablet by mouth 2 times daily     blood glucose monitor kit and supplies  Dispense sufficient amount for indicated testing frequency plus additional to accommodate PRN testing needs. Dispense all needed supplies to include: monitor, strips, lancing device, lancets, control solutions, alcohol swabs. insulin aspart 100 UNIT/ML injection vial  Commonly known as: NovoLOG  **Corrective Low Dose Algorithm** Glucose: Dose:   No Insulin 140-199 1 Unit 200-249 2 Units 250-299 3 Units 300-349 4 Units 350-399 5 Units Over 399 6 Units     metoclopramide 10 MG tablet  Commonly known as: REGLAN  Take 1 tablet by mouth daily     total parenteral nutrition without lipids  infusion  Commonly known as: TPN WITHOUT LIPIDS  Infuse 1,000 mLs intravenously every 24 hours Please view attached printout for detailed order instructions. Compound per protocol.             CONTINUE taking these medications      CREON PO     losartan 50 MG tablet  Commonly known as: COZAAR     One-A-Day Proactive 65+ Tabs     pantoprazole 40 MG tablet  Commonly known as: PROTONIX     sertraline 25 MG tablet  Commonly known as: ZOLOFT            STOP taking these medications      aspirin 81 MG chewable tablet     OIL OF OREGANO PO               Where to Get Your Medications        These medications were sent to [facility],[address], [address] [address]      Phone: [phone]   apixaban 5 MG Tabs tablet  blood glucose monitor kit and supplies  insulin aspart 100 UNIT/ML injection vial  metoclopramide 10 MG tablet       You can get these medications from any pharmacy    Bring a paper prescription for each of these medications  total parenteral nutrition without lipids  infusion       Activity: activity as tolerated  Diet: clear liquids    Pt has been advised to: Follow-up with Leonor Toro MD in 1 week. Follow-up with consultants as recommended by them    Note that over 30 minutes was spent in preparing discharge papers, discussing discharge with patient, medication review, etc.    Signed:  NAMRATA Fisher CNP  10/8/2021  5:51 PM    Above note edited to reflect my thoughts     I personally saw, examined and provided care for the patient. Radiographs, labs and medication list were reviewed by me independently. The case was discussed in detail and plans for care were established. Review of GITA Fisher   , documentation was conducted and revisions were made as appropriate directly by me. I agree with the above documented exam, problem list, and plan of care.      Elke Valladares MD  10/8 /2021

## 2021-10-08 NOTE — PROGRESS NOTES
facilitation of postural control with dynamic challenges during ADLs .       Positioning to improve functional independence     Recommended Adaptive Equipment: 3-in-1     Home Living: Pt lives with alone, 1 story with 3 steps/rail. Basement    Equipment owned: walker     Prior Level of Function: (prior to surgery 9/9/2021)  Independent with ADLs , Independent  with IADLs; ambulated with no device     Pain Level: discomfort when moving. Cognition: Awake and alert. Functional Assessment:  AM-PAC Daily Activity Raw Score: 16/24    Initial Eval Status  Date: 10/5/21 Treatment Status  Date: 10/8/21  STGs = LTGs  Time frame: 10-14 days   Feeding Independent       Grooming SBA/set-up,seated   Decrease standing tolerance  Setup while standing at the sink. Limited stand tolerance for ADL.   Independent    UB Dressing SBA/set-up  Min A due to IV line  Independent    LB Dressing Mod A Family present and assisting as needed.   Mod I    Bathing Mod A    Mod I    Toileting Assist with thorough hygiene  Pt able to complete toilet hygiene after urination.   Mod I    Bed Mobility  SBA   Supine <> sit   (HOB elevated)  Min A supine to sit using log roll method.   Mod I    Functional Transfers SBA   Sit-stand from bed   SBA from bed. Min A from standard toilet height. Mod I    Functional Mobility SBA,w/walker  Household distance   Fatigued - returned to bed   Mild SOB noted  O2 sats 92-93% on room air   HR 120s SBA using w/w to and from bathroom.   Mod I  with good tolerance    Balance Sitting:     Static:  Supervision     Dynamic:Denise   Standing: SBA    Independent    Activity Tolerance Fair with light activity  Fair -  Good  with ADL activity        Comments:  Pt pleasant and cooperative. Family member present in the room and assisting pt as needed. Pt declined to sit in the chair. States that it is uncomfortable. Returned to bed after session.       Education/treatment:  ADL retraining with facilitation of movement to increase self care skills. Therapeutic activity to address balance and endurance for AdL and transfers. Pt education of walker safety, transfer safety, and energy conservation. · Pt has made  progress towards set goals.        Time In: 10:12   Time Out: 10:35     Min Units   Therapeutic Ex 45764     Therapeutic Activities 48750 8 1   ADL/Self Care 94842 15 1   Orthotic Management 96413     Neuro Re-Ed 88033     Non-Billable Time     TOTAL TIMED TREATMENT 23 [facility] LATANYA/L 23433

## 2021-10-08 NOTE — PROGRESS NOTES
PROGRESS NOTE    Patient Presents with/Seen in Consultation For      *possible decompressive PEG placement  CHIEF COMPLAINT:  N/V, shortness of breath    Subjective:     Patient states she is doing ok. Reports mild discomfort at PEG site rated 6/10 aching discomfort. Pt states ice helps the pain. Pt denies BM x 3 days, denies passing flatus. States she is going home today. 925 mls out from PEG over past 24 hr, dark bilious to tea colored    Review of Systems  Aside from what was mentioned in the PMH and HPI, essentially unremarkable, all others negative. Objective:     BP (!) 140/77   Pulse 109   Temp 97.9 °F (36.6 °C) (Oral)   Resp 16   Ht 5' 7\" (1.702 m)   Wt 195 lb 3.2 oz (88.5 kg)   SpO2 93%   BMI 30.57 kg/m²     General appearance: alert, awake, pale, laying in bed with sister in law at [facility], pt with wet cloth on forehead, cheeks flushed, and cooperative  Eyes: conjunctiva pale, sclera anicteric. PERRL.   Lungs: clear to auscultation bilaterally  Heart: regular rate and rhythm, no murmur, 2+ pulses; no edema  Abdomen: soft, non-tender, PEG tube intact left abdomen with dressing dry and intact, PEG to gravity drainage with dark bilious to tea colored drainage,  bowel sounds normal; no masses,  no organomegaly  Extremities: extremities without edema  Pulses: 2+ and symmetric  Skin: Skin color pale, texture, turgor normal.   Neurologic: Grossly normal    PN-Adult  3 IN 1 Central Line (Standard), Continuous TPN  metoclopramide (REGLAN) injection 10 mg, Daily  ipratropium-albuterol (DUONEB) nebulizer solution 1 ampule, Q4H PRN  morphine (PF) injection 2 mg, Q4H PRN  erythromycin (ERYTHROCIN) 500 mg in sodium chloride 0.9% 250 mL IVPB, Q6H  bisacodyl (DULCOLAX) suppository 10 mg, Daily PRN  insulin lispro (HUMALOG) injection vial 0-6 Units, Q6H  diatrizoate meglumine-sodium (GASTROGRAFIN) 66-10 % solution 120 mL, ONCE PRN  apixaban (ELIQUIS) tablet 5 mg, BID  pantoprazole (PROTONIX) injection 40 mg, BID And  sodium chloride (PF) 0.9 % injection 10 mL, BID  lidocaine PF 1 % injection 5 mL, Once  sodium chloride flush 0.9 % injection 5-40 mL, 2 times per day  sodium chloride flush 0.9 % injection 5-40 mL, PRN  0.9 % sodium chloride infusion, PRN  heparin flush 100 UNIT/ML injection 300 Units, 2 times per day  heparin flush 100 UNIT/ML injection 300 Units, PRN  diphenhydrAMINE (BENADRYL) injection 25 mg, Q6H PRN  melatonin tablet 3 mg, Nightly PRN  butamben-tetracaine-benzocaine (CETACAINE) spray 1 spray, Q2H PRN  sodium chloride (PF) 0.9 % injection 10 mL, Daily  losartan (COZAAR) tablet 50 mg, Daily  sertraline (ZOLOFT) tablet 25 mg, Daily  sodium chloride flush 0.9 % injection 10 mL, 2 times per day  sodium chloride flush 0.9 % injection 10 mL, PRN  0.9 % sodium chloride infusion, PRN  potassium chloride (KLOR-CON M) extended release tablet 40 mEq, PRN   Or  potassium bicarb-citric acid (EFFER-K) effervescent tablet 40 mEq, PRN   Or  potassium chloride 10 mEq/100 mL IVPB (Peripheral Line), PRN  magnesium hydroxide (MILK OF MAGNESIA) 400 MG/5ML suspension 30 mL, Daily PRN  acetaminophen (TYLENOL) tablet 650 mg, Q6H PRN   Or  acetaminophen (TYLENOL) suppository 650 mg, Q6H PRN  trimethobenzamide (TIGAN) injection 200 mg, Q6H PRN         Data Review  CBC:   Lab Results   Component Value Date    WBC 5.2 10/08/2021    RBC 3.67 10/08/2021    HGB 9.6 10/08/2021    HCT 33.5 10/08/2021    MCV 91.3 10/08/2021    MCH 26.2 10/08/2021    MCHC 28.7 10/08/2021    RDW 14.5 10/08/2021     10/08/2021    MPV 11.0 10/08/2021     CMP:    Lab Results   Component Value Date     10/08/2021    K 4.2 10/08/2021    K 3.9 10/01/2021     10/08/2021    CO2 29 10/08/2021    BUN 24 10/08/2021    CREATININE 0.6 10/08/2021    GFRAA >60 10/08/2021    LABGLOM >60 10/08/2021    GLUCOSE 235 10/08/2021    PROT 5.8 10/08/2021    LABALBU 3.0 10/08/2021    CALCIUM 9.0 10/08/2021    BILITOT 0.3 10/08/2021    ALKPHOS 128 10/08/2021    AST 15 10/08/2021    ALT 14 10/08/2021     Hepatic Function Panel:    Lab Results   Component Value Date    ALKPHOS 128 10/08/2021    ALT 14 10/08/2021    AST 15 10/08/2021    PROT 5.8 10/08/2021    BILITOT 0.3 10/08/2021    LABALBU 3.0 10/08/2021     No components found for: CHLPL    Lab Results   Component Value Date    TRIG 49 10/02/2021     No results found for: HDL  No results found for: LDLCALC  No results found for: LABVLDL   PT/INR:    Lab Results   Component Value Date    PROTIME 13.5 10/06/2021    INR 1.2 10/06/2021     IRON:  No results found for: IRON  Iron Saturation:  No components found for: PERCENTFE  FERRITIN:  No results found for: FERRITIN      Assessment:     Principal Problem:    Hypoxia  Active Problems:  ? Delayed gastric emptying  ? Pylorus-preserving pancreaticoduodenectomy 9/9/2021 at [facility] for IPMN with low-grade dysplasia  ? Pneumatosis -surgery following  ? Nausea and vomiting -resolved  ? Severe constipation  ? Elevated alk phos  ? Anemia, normocytic  ? Pleural effusions-pulmonary following  ? Echogenic thrombus left peroneal vein-defer to PCP  ? EGD/PEG 10/6/21 - Grade A LA classification GERD, gastritis, astric distension. A 600 mL of retained secretions were suctioned during the procedure. Duodenum was unremarkable. G-tube placed without difficulty. Plan:     ? PEG to gravity  ? Routine PEG tube care, dressing change daily and prn.  ? TPN per surgery  ? NPO orally until gastric outlet obstruction resolves  ? Continue Reglan and Erythromycin as ordered  ? Continue Protonix as ordered  ? Discharge per PCP/Surgery, ok from gi pov. TPN to be continued at home with max prokinetics. Nutrition to be managed per surgery. Pt to call for follow up as needed. Note: This report was completed utilizing computer voice recognition software. Every effort has been made to ensure accuracy, however; inadvertent computerized transcription errors may be present.      Discussed with Dr. Steffany Kimble per Dr. Shai Bosch RUIY-BSML-NC, FNP-BC 10/8/2021 10:25 AM For Dr. Collin Knight. I HAD A FACE TO FACE ENCOUNTER WITH THE PATIENT. AGREE WITH THE EXAM, ASSESSMENT, AND PLAN AS OUTLINED ABOVE. ADDITION AND CORRECTIONS WERE DONE AS DEEMED APPROPRIATE. MY EXAM AND PLAN INCLUDE: G-TUBE FUNCTIONING APPROPRIATELY. PATIENT ENJOYING SIPS OF WATER. VOICED NO COMPLAINTS. HOME HEALTH CARE PLANNING NOTED. OK FOR DISCHARGE FROM GI POV.

## 2021-10-08 NOTE — PROGRESS NOTES
[facility] SURGERY  DAILY PROGRESS NOTE  10/8/2021    CC: nausea    Subjective:  No issues. Drink lots of water which is coming out through the PEG. No abdominal pain. No vomiting. Objective:  BP (!) 140/77   Pulse 109   Temp 97.9 °F (36.6 °C) (Oral)   Resp 16   Ht 5' 7\" (1.702 m)   Wt 195 lb 3.2 oz (88.5 kg)   SpO2 93%   BMI 30.57 kg/m²     GENERAL:  No acute distress. Alert and interactive. Oriented x3. LUNGS:  No cough. Nonlabored breathing on RA  CARDIOVASC:  Normal rate, no cyanosis. ABDOMEN:  Soft, non distended, non tender. No guarding / rigidity / rebound. NG to LIS. Incisions CDIT with minimal bruising surrounding incision  EXTREMITIES: Moves all extremities. No swelling or edema. Assessment/Plan:  67 y.o. female female with delayed gastric emptying s/p Pylorus preserving Whipple procedure 9/9/21 at the [facility] with protracted hospitalization complicated by pneumatosis and DGE was discharged on 9/28 with return to ED on 9/29.  - please arrange home TPN  - okay to discharge once home TPN established        Attending Physician Statement:    Chief Complaint:   Chief Complaint   Patient presents with    Shortness of Breath    Emesis       I have examined the patient and performed the key aspects of physical exam, reviewed the record (including all pertinent and new radiology images and laboratory findings), and discussed the case with the surgical team.  I agree with the assessment and plan with the following additions, corrections, and changes. 14pt review of symptoms completed and negative except as mentioned. Feeling better. Tolerating p.o. liquids which drain to the PEG. Keep PEG to gravity. Okay to discharge once home TPN established. Jose Eduardo Campos MD  10/08/21  4:10 PM    NOTE: This report, in part or full, may have been transcribed using voice recognition software.  Every effort was made to ensure accuracy; however, inadvertent computerized transcription errors may be present. Please excuse any transcriptional grammatical or spelling errors that may have escaped my editorial review.

## 2021-10-08 NOTE — PROGRESS NOTES
[facility] Surgery    Patient underwent a whipple 9/9 at [facility]. Complicated by pneumatosis and delayed gastric emptying. She continued to have persistent emesis and is now 1 month from surgery. She had an Upper GI which showed no passage of contrast out of her stomach after 6 hours. She is on max dose of prokinetics and has lost >10% of her body weight in 3 months. We can not do enteral feeds secondary to the gastric outlet obstruction. Patient needs TPN until gastric outlet obstruction resolves. Due to her recent open surgery she can not have a feeding jejunostomy placed.     Electronically signed by Zaid Chavarria MD on 10/8/2021 at 6:58 AM

## 2021-10-08 NOTE — PROGRESS NOTES
[facility] Quality Flow/Interdisciplinary Rounds Progress Note        Quality Flow Rounds held on October 8, 2021    Disciplines Attending:  Bedside Nurse, ,  and Nursing Unit 130 Medical [facility] was admitted on 9/29/2021 11:13 PM    Anticipated Discharge Date:  Expected Discharge Date: 10/08/21    Disposition:    Darren Score:  Darren Scale Score: 19    Readmission Risk              Risk of Unplanned Readmission:  14           Discussed patient goal for the day, patient clinical progression, and barriers to discharge.   The following Goal(s) of the Day/Commitment(s) have been identified:  Diagnostics - Report Results and Labs - Report Results, DC planning      Aidan Stroud RN  October 8, 2021

## 2021-10-08 NOTE — OP NOTE
[address] [address]                                OPERATIVE REPORT    PATIENT NAME: Maged Betancourt                    :        1945  MED REC NO:   [id number]                            ROOM:       2195  ACCOUNT NO:   [de-identified]                           ADMIT DATE: 2021  PROVIDER:     Reid Mayorga MD    DATE OF PROCEDURE:  10/06/2021    PREOPERATIVE DIAGNOSES:  This is a 76-year-old female status post  Whipple surgery in [facility] for IPMNs of the pancreas, which was  done a few weeks ago. She developed severe gastroparesis, known side  effect after the procedure with a markedly large retained gastric  secretions averaging about 1600 at some times a day. This procedure is  to place a decompressive G-tube and also to establish nutrition. She is  currently on TPN. POSTOPERATIVE DIAGNOSES:  Grade A LA classification GERD, gastritis,  gastric distension. A 600 mL of retained secretions were suctioned  during the procedure. Duodenum was unremarkable. G-tube placed without  difficulty. SURGEON:  Reid Mayorga MD.    PROCEDURE PERFORMED:  Upper endoscopy with PEG tube placement. ANESTHESIA:  LMAC. NOTE:  Prior to the procedure, the risks as well as the alternatives  were explained to the family as well as to the patient. PROCEDURE:  With the patient in the supine position, the Olympus GIF-100  forward-viewing videoscope was introduced into the esophagus, the  evaluation of which showed grade A LA classification GERD. It was advanced through the GE junction into the gastric body along the  greater curvature. Evaluation of the body of the stomach showed  gastritis. The scope was then advanced through the pylorus into the  duodenal bulb and second portion of the duodenum. Duodenum was  unremarkable.   The scope was then retrieved and retroflexed in the  prepyloric antrum, with thorough evaluation of the stomach, which  appeared to be within normal limits. The scope was then straightened and the area was insufflated until a  suitable light source was seen on the anterior abdominal wall. That  area where the light source was visualized was sterilized using Betadine  solution and locally anesthetized using Xylocaine solution. The area  was then draped and a small incision was made with an 11-Anguillan blade. Through that incision, the Angiocath was introduced and visualized  endoscopically to be in the body of the stomach. A polypectomy snare was then introduced and grasped around the  Angiocath. The core of the Angiocath was removed and the guidewire was  introduced into the plastic sheath of the Angiocath into the body of the  stomach. The polypectomy snare was then adjusted so as to grasp the  guidewire, and the wire, the snare and the scope were then retrieved via  the patient's oral cavity. On the oral end of the guidewire, the Bard feeding tube was then  attached and, by pulling on the abdominal end of the guidewire, the tube  was secured in place. The scope was then reintroduced, and verification  of tube placement via pictures was taken. The scope was then retrieved,  the area decompressed, and the procedure was terminated.         Ml De Leon MD    D: 10/07/2021 11:49:09       T: 10/07/2021 13:36:46     [email or web address]  Job#: [id number]     Doc#: [id number]    CC:  MD Ann Hartman MD

## 2021-10-09 ENCOUNTER — APPOINTMENT (OUTPATIENT)
Dept: CT IMAGING | Age: 76
End: 2021-10-09
Payer: MEDICARE

## 2021-10-09 LAB
ALBUMIN SERPL-MCNC: 2.4 G/DL (ref 3.5–5.2)
ALP BLD-CCNC: 101 U/L (ref 35–104)
ALT SERPL-CCNC: 11 U/L (ref 0–32)
ANION GAP SERPL CALCULATED.3IONS-SCNC: 9 MMOL/L (ref 7–16)
AST SERPL-CCNC: 16 U/L (ref 0–31)
BACTERIA: ABNORMAL /HPF
BASOPHILS ABSOLUTE: 0.03 E9/L (ref 0–0.2)
BASOPHILS RELATIVE PERCENT: 0.5 % (ref 0–2)
BILIRUB SERPL-MCNC: 0.3 MG/DL (ref 0–1.2)
BILIRUBIN URINE: NEGATIVE
BLOOD, URINE: NEGATIVE
BUN BLDV-MCNC: 29 MG/DL (ref 6–23)
CALCIUM SERPL-MCNC: 8.5 MG/DL (ref 8.6–10.2)
CHLORIDE BLD-SCNC: 106 MMOL/L (ref 98–107)
CHP ED QC CHECK: YES
CHP ED QC CHECK: YES
CLARITY: NORMAL
CO2: 26 MMOL/L (ref 22–29)
COLOR: YELLOW
CREAT SERPL-MCNC: 0.7 MG/DL (ref 0.5–1)
EOSINOPHILS ABSOLUTE: 0.1 E9/L (ref 0.05–0.5)
EOSINOPHILS RELATIVE PERCENT: 1.5 % (ref 0–6)
EPITHELIAL CELLS, UA: ABNORMAL /HPF
GFR AFRICAN AMERICAN: >60
GFR NON-AFRICAN AMERICAN: >60 ML/MIN/1.73
GLUCOSE BLD-MCNC: 106 MG/DL
GLUCOSE BLD-MCNC: 149 MG/DL
GLUCOSE BLD-MCNC: 464 MG/DL (ref 74–99)
GLUCOSE URINE: NEGATIVE MG/DL
HCT VFR BLD CALC: 28.3 % (ref 34–48)
HEMOGLOBIN: 8.5 G/DL (ref 11.5–15.5)
IMMATURE GRANULOCYTES #: 0.03 E9/L
IMMATURE GRANULOCYTES %: 0.5 % (ref 0–5)
KETONES, URINE: NEGATIVE MG/DL
LACTIC ACID, SEPSIS: 1 MMOL/L (ref 0.5–1.9)
LEUKOCYTE ESTERASE, URINE: NEGATIVE
LIPASE: 13 U/L (ref 13–60)
LYMPHOCYTES ABSOLUTE: 1.23 E9/L (ref 1.5–4)
LYMPHOCYTES RELATIVE PERCENT: 18.5 % (ref 20–42)
MCH RBC QN AUTO: 27.7 PG (ref 26–35)
MCHC RBC AUTO-ENTMCNC: 30 % (ref 32–34.5)
MCV RBC AUTO: 92.2 FL (ref 80–99.9)
METER GLUCOSE: 106 MG/DL (ref 74–99)
METER GLUCOSE: 134 MG/DL (ref 74–99)
METER GLUCOSE: 149 MG/DL (ref 74–99)
METER GLUCOSE: 76 MG/DL (ref 74–99)
MONOCYTES ABSOLUTE: 0.46 E9/L (ref 0.1–0.95)
MONOCYTES RELATIVE PERCENT: 6.9 % (ref 2–12)
NEUTROPHILS ABSOLUTE: 4.8 E9/L (ref 1.8–7.3)
NEUTROPHILS RELATIVE PERCENT: 72.1 % (ref 43–80)
NITRITE, URINE: NEGATIVE
PDW BLD-RTO: 14.6 FL (ref 11.5–15)
PH UA: 7 (ref 5–9)
PLATELET # BLD: 275 E9/L (ref 130–450)
PMV BLD AUTO: 10.7 FL (ref 7–12)
POTASSIUM REFLEX MAGNESIUM: 5.2 MMOL/L (ref 3.5–5)
PROTEIN UA: NEGATIVE MG/DL
RBC # BLD: 3.07 E12/L (ref 3.5–5.5)
RBC UA: ABNORMAL /HPF (ref 0–2)
SARS-COV-2, NAAT: NOT DETECTED
SODIUM BLD-SCNC: 141 MMOL/L (ref 132–146)
SPECIFIC GRAVITY UA: 1.01 (ref 1–1.03)
TOTAL PROTEIN: 4.8 G/DL (ref 6.4–8.3)
TROPONIN, HIGH SENSITIVITY: 43 NG/L (ref 0–9)
TROPONIN, HIGH SENSITIVITY: 43 NG/L (ref 0–9)
UROBILINOGEN, URINE: 0.2 E.U./DL
WBC # BLD: 6.7 E9/L (ref 4.5–11.5)
WBC UA: ABNORMAL /HPF (ref 0–5)

## 2021-10-09 PROCEDURE — 96365 THER/PROPH/DIAG IV INF INIT: CPT

## 2021-10-09 PROCEDURE — 87040 BLOOD CULTURE FOR BACTERIA: CPT

## 2021-10-09 PROCEDURE — 6360000002 HC RX W HCPCS

## 2021-10-09 PROCEDURE — 87635 SARS-COV-2 COVID-19 AMP PRB: CPT

## 2021-10-09 PROCEDURE — 84484 ASSAY OF TROPONIN QUANT: CPT

## 2021-10-09 PROCEDURE — 96366 THER/PROPH/DIAG IV INF ADDON: CPT

## 2021-10-09 PROCEDURE — 96367 TX/PROPH/DG ADDL SEQ IV INF: CPT

## 2021-10-09 PROCEDURE — 6360000002 HC RX W HCPCS: Performed by: EMERGENCY MEDICINE

## 2021-10-09 PROCEDURE — 6360000004 HC RX CONTRAST MEDICATION: Performed by: RADIOLOGY

## 2021-10-09 PROCEDURE — 82962 GLUCOSE BLOOD TEST: CPT

## 2021-10-09 PROCEDURE — 2580000003 HC RX 258

## 2021-10-09 PROCEDURE — 2580000003 HC RX 258: Performed by: EMERGENCY MEDICINE

## 2021-10-09 PROCEDURE — 96375 TX/PRO/DX INJ NEW DRUG ADDON: CPT

## 2021-10-09 PROCEDURE — 36415 COLL VENOUS BLD VENIPUNCTURE: CPT

## 2021-10-09 PROCEDURE — 74177 CT ABD & PELVIS W/CONTRAST: CPT

## 2021-10-09 PROCEDURE — 6370000000 HC RX 637 (ALT 250 FOR IP): Performed by: EMERGENCY MEDICINE

## 2021-10-09 PROCEDURE — 96361 HYDRATE IV INFUSION ADD-ON: CPT

## 2021-10-09 PROCEDURE — 93005 ELECTROCARDIOGRAM TRACING: CPT

## 2021-10-09 RX ORDER — MORPHINE SULFATE 2 MG/ML
INJECTION, SOLUTION INTRAMUSCULAR; INTRAVENOUS
Status: COMPLETED
Start: 2021-10-09 | End: 2021-10-09

## 2021-10-09 RX ORDER — METOCLOPRAMIDE HYDROCHLORIDE 5 MG/ML
10 INJECTION INTRAMUSCULAR; INTRAVENOUS ONCE
Status: COMPLETED | OUTPATIENT
Start: 2021-10-09 | End: 2021-10-09

## 2021-10-09 RX ORDER — 0.9 % SODIUM CHLORIDE 0.9 %
1000 INTRAVENOUS SOLUTION INTRAVENOUS ONCE
Status: COMPLETED | OUTPATIENT
Start: 2021-10-09 | End: 2021-10-09

## 2021-10-09 RX ORDER — MORPHINE SULFATE 4 MG/ML
4 INJECTION, SOLUTION INTRAMUSCULAR; INTRAVENOUS ONCE
Status: DISCONTINUED | OUTPATIENT
Start: 2021-10-09 | End: 2021-10-10 | Stop reason: HOSPADM

## 2021-10-09 RX ORDER — MORPHINE SULFATE 2 MG/ML
4 INJECTION, SOLUTION INTRAMUSCULAR; INTRAVENOUS ONCE
Status: COMPLETED | OUTPATIENT
Start: 2021-10-09 | End: 2021-10-09

## 2021-10-09 RX ORDER — SODIUM CHLORIDE 9 MG/ML
INJECTION, SOLUTION INTRAVENOUS CONTINUOUS
Status: DISCONTINUED | OUTPATIENT
Start: 2021-10-09 | End: 2021-10-10 | Stop reason: HOSPADM

## 2021-10-09 RX ORDER — ONDANSETRON 2 MG/ML
4 INJECTION INTRAMUSCULAR; INTRAVENOUS ONCE
Status: COMPLETED | OUTPATIENT
Start: 2021-10-09 | End: 2021-10-09

## 2021-10-09 RX ADMIN — PIPERACILLIN AND TAZOBACTAM 3375 MG: 3; .375 INJECTION, POWDER, LYOPHILIZED, FOR SOLUTION INTRAVENOUS at 02:06

## 2021-10-09 RX ADMIN — SODIUM CHLORIDE: 9 INJECTION, SOLUTION INTRAVENOUS at 19:48

## 2021-10-09 RX ADMIN — INSULIN HUMAN 10 UNITS: 100 INJECTION, SOLUTION PARENTERAL at 02:11

## 2021-10-09 RX ADMIN — IOPAMIDOL 75 ML: 755 INJECTION, SOLUTION INTRAVENOUS at 00:22

## 2021-10-09 RX ADMIN — MORPHINE SULFATE 4 MG: 2 INJECTION, SOLUTION INTRAMUSCULAR; INTRAVENOUS at 22:54

## 2021-10-09 RX ADMIN — SODIUM CHLORIDE: 9 INJECTION, SOLUTION INTRAVENOUS at 09:32

## 2021-10-09 RX ADMIN — MORPHINE SULFATE 4 MG: 2 INJECTION, SOLUTION INTRAMUSCULAR; INTRAVENOUS at 17:27

## 2021-10-09 RX ADMIN — ONDANSETRON 4 MG: 2 INJECTION INTRAMUSCULAR; INTRAVENOUS at 04:16

## 2021-10-09 RX ADMIN — SODIUM CHLORIDE 1000 ML: 9 INJECTION, SOLUTION INTRAVENOUS at 03:32

## 2021-10-09 RX ADMIN — VANCOMYCIN HYDROCHLORIDE 1750 MG: 5 INJECTION, POWDER, LYOPHILIZED, FOR SOLUTION INTRAVENOUS at 04:18

## 2021-10-09 RX ADMIN — METOCLOPRAMIDE 10 MG: 5 INJECTION, SOLUTION INTRAMUSCULAR; INTRAVENOUS at 13:32

## 2021-10-09 RX ADMIN — MORPHINE SULFATE 4 MG: 2 INJECTION, SOLUTION INTRAMUSCULAR; INTRAVENOUS at 13:30

## 2021-10-09 RX ADMIN — SODIUM CHLORIDE 2517 ML: 9 INJECTION, SOLUTION INTRAVENOUS at 00:02

## 2021-10-09 ASSESSMENT — PAIN SCALES - GENERAL
PAINLEVEL_OUTOF10: 10
PAINLEVEL_OUTOF10: 7
PAINLEVEL_OUTOF10: 8
PAINLEVEL_OUTOF10: 8
PAINLEVEL_OUTOF10: 10

## 2021-10-09 ASSESSMENT — ENCOUNTER SYMPTOMS
CONSTIPATION: 0
BLOOD IN STOOL: 0
WHEEZING: 0
SHORTNESS OF BREATH: 0
ABDOMINAL PAIN: 0
CHEST TIGHTNESS: 0
VOMITING: 0
COUGH: 0
NAUSEA: 0
DIARRHEA: 0

## 2021-10-09 NOTE — PROGRESS NOTES
B
Reconnected patient TPN, Blood sugar back to 134
Surgery:    Discussed patient with ER. Patient stable, abdominal exam reported benign. CT with increased abscess size compared to last scan from [facility], also possible partial PEG displacement. Awaiting bed at [facility] for transfer. Agree with transfer back to [facility]. PEG recently placed by GI - would defer mgmt to them regarding possible revision if needed. Will be available if needed, please call.       Luc Bourgeois MD  10/9/21  12:23 PM
CBC results with H & H of 8.5/28.4 per chart. GYN/ONC team made aware. To follow up with possible intervention. VS per flowsheet. Abdominal dsg CDI. Urine output WNL. Will follow up.
pt with low diastolic bp and slightly elevated HR
pt's BP is 171/66
pt's BP is 180/70, HR 98

## 2021-10-09 NOTE — ED NOTES
Patients daughter at bedside. Patient repositioned for comfort. IV fluids infusing per order. VSS. Call light within reach.       Kaiser Slade RN  10/09/21 6500

## 2021-10-09 NOTE — ED NOTES
Pt resting. VSS. Oral swabs provided. Pt awaiting bed at [facility].      Kaci Salcido RN  10/09/21 2132

## 2021-10-09 NOTE — ED PROVIDER NOTES
ATTENDING PROVIDER ATTESTATION:     Sharie Baumgarten presented to the emergency department for evaluation of Fever (100.0 at home), Other (d/c'd from [facility]; home for approx 5 hours, drainage from G tube, LUQ--10,000ml per family), and Fatigue (Generalized)   and was initially evaluated by the Medical Resident. See Original ED Note for H&P and ED course above. I have reviewed and discussed the case, including pertinent history (medical, surgical, family and social) and exam findings with the Medical Resident assigned to Sharie Baumgarten. I have personally performed and/or participated in the history, exam, medical decision making, and procedures and agree with all pertinent clinical information. Critical Care:  Please note that the withdrawal or failure to initiate urgent interventions for this patient would likely result in a life threatening deterioration or permanent disability. Accordingly this patient received 48 minutes of critical care time, excluding separately billable procedures. I, Dr. Abhishek Roblero MD, am the primary provider of this record. I have reviewed my findings and recommendations with the assigned Medical Resident, Sharie Baumgarten and members of family present at the time of disposition. My findings/plan: The primary encounter diagnosis was Abdominal wall abscess. Diagnoses of Hypotension, unspecified hypotension type, Hyperkalemia, Hyperglycemia, and Dislodged gastrostomy tube were also pertinent to this visit.   New Prescriptions    No medications on file     Abhishek Roblero MD      [address]      Pt Name: Sharie Baumgarten  MRN: [id number]  [place] 1945  Date of evaluation: 10/8/2021      CHIEF COMPLAINT       Chief Complaint   Patient presents with    Fever     100.0 at home    Other     d/c'd from [facility]; home for approx 5 hours, drainage from G tube, LUQ--10,000ml per family    Fatigue     Generalized        HPI  Roberto Grimes is a 67 y.o. female with past medical history of pancreatic cancer status post Whipple([facility]) complicated by severe gastroparesis requiring PEG tube placement done yesterday by Dr. Tahira Brown discharged from [address] [place] [address] today at 4:30pm,  presents with fever, generalized fatigue and weakness, increased drainage from G-tube (10 L per family) and shortness of breath, worsening since discharge from the ED. Associated with vomitting, patient had several episodes today, non bilious/nonbloody. Patient has has 1 BM for the past 2 weeks, not passing gas. Her symptoms are persistent, severe and with alleviating or exacerbating factors. Daughter at bedside states that 10L was drained from G tube, states patient had a fever at home, EMS measured 100F. Denies any headache, dizziness, cp, palpitations, sob, cough, abd pain, dysuria, hematuria, diarrhea. Except as noted above the remainder of the review of systems was reviewed and negative. Review of Systems   Constitutional: Positive for activity change, appetite change, chills, fatigue and fever. HENT: Negative for congestion, nosebleeds and tinnitus. Eyes: Negative for visual disturbance. Respiratory: Negative for cough, chest tightness, shortness of breath and wheezing. Cardiovascular: Negative for chest pain, palpitations and leg swelling. Gastrointestinal: Negative for abdominal pain, blood in stool, constipation, diarrhea, nausea and vomiting. Genitourinary: Negative for dysuria, flank pain, hematuria, vaginal bleeding, vaginal discharge and vaginal pain. Skin: Negative for rash. Neurological: Negative for dizziness, syncope, weakness, numbness and headaches. Psychiatric/Behavioral: Negative for agitation and behavioral problems. All other systems reviewed and are negative. Physical Exam  Constitutional:       General: She is sleeping.  She is not in acute distress. Appearance: Normal appearance. She is normal weight. She is ill-appearing and toxic-appearing. She is not diaphoretic. Interventions: Nasal cannula in place. Comments: On 3L on arrival, weaned off oxygen remained saturating over 95% on RA   HENT:      Head: Normocephalic and atraumatic. Right Ear: External ear normal.      Left Ear: External ear normal.      Nose: Nose normal. No congestion or rhinorrhea. Mouth/Throat:      Mouth: Mucous membranes are moist.      Pharynx: Oropharynx is clear. No oropharyngeal exudate or posterior oropharyngeal erythema. Eyes:      Extraocular Movements: Extraocular movements intact. Conjunctiva/sclera: Conjunctivae normal.      Pupils: Pupils are equal, round, and reactive to light. Cardiovascular:      Rate and Rhythm: Normal rate and regular rhythm. Pulses: Normal pulses. Heart sounds: Normal heart sounds. Pulmonary:      Effort: Pulmonary effort is normal.      Breath sounds: Normal breath sounds. Abdominal:      General: Abdomen is flat. A surgical scar is present. Bowel sounds are normal. There is no distension. Palpations: Abdomen is soft. There is no mass. Tenderness: There is no right CVA tenderness, left CVA tenderness or guarding. Hernia: No hernia is present. Comments: PEG L abdomen- healing well, no signs of infection. Musculoskeletal:      Cervical back: Normal range of motion. Comments: PICC line left arm   Skin:     General: Skin is warm and dry. Capillary Refill: Capillary refill takes less than 2 seconds. Neurological:      General: No focal deficit present. Mental Status: She is oriented to person, place, and time and easily aroused. Mental status is at baseline. She is lethargic. Psychiatric:         Mood and Affect: Mood normal.         Behavior: Behavior normal.         Thought Content:  Thought content normal.          Procedures     MDM   Patient is a 69-year-old female s/p Whipple at [facility] 9/9/21 with prolonged hospitalization, recent admission to our hospital for pleural effusions s/p drainage and PEG tube placement presenting with high output from PEG tube, hypotension, and reported fevers at home. Patient was hypotensive on arrival but responded well to IV fluids. She was afebrile in the ED. Imaging showed an abdominal wall abscess as well as displacement of her G-tube. Labs showed hyperglycemia which was treated with insulin with improvement. No anion gap to suggest DKA. She also had anemia of 8.5 without any obvious source of bleeding. General surgery was consulted, and they recommended transfer to [facility]. [facility] was consulted, and Dr. Abel Leon accepted the patient for transfer. She has been treated with broad-spectrum antibiotics. Discussed all of the findings with patient and family member at bedside. ED Course as of Oct 09 0749   Sat Oct 09, 2021   0135 General surgery was consulted. He recommends transfer to [facility] since patient had her Whipple procedure there. [JA]   0251 EKG: This EKG is signed and interpreted by me. Rate: 105  Rhythm: Sinus  Interpretation: Sinus tachycardia, PACs, normal AK interval, normal Qtc, nonspecific T wave abnormalities  Comparison: changes compared to previous EKG      [JA]   2560 Spoke with Dr. Abel Leon at [facility]. Patient was accepted for transfer. [JA]      ED Course User Index  [JA] Abhishek Roblero MD       --------------------------------------------- PAST HISTORY ---------------------------------------------  Past Medical History:  has a past medical history of Depression, GERD (gastroesophageal reflux disease), and Hypertension. Past Surgical History:  has a past surgical history that includes Pancreas surgery (09/09/2021) and Gastrostomy tube placement (N/A, 10/6/2021). Social History:  reports that she has never smoked. She has never used smokeless tobacco. She reports previous alcohol use.  She reports that she does not use drugs. Family History: family history is not on file. The patients home medications have been reviewed.     Allergies: Chocolate    -------------------------------------------------- RESULTS -------------------------------------------------    Lab  Results for orders placed or performed during the hospital encounter of 10/08/21   COVID-19, Rapid    Specimen: Nasopharyngeal Swab   Result Value Ref Range    SARS-CoV-2, NAAT Not Detected Not Detected   CBC Auto Differential   Result Value Ref Range    WBC 6.7 4.5 - 11.5 E9/L    RBC 3.07 (L) 3.50 - 5.50 E12/L    Hemoglobin 8.5 (L) 11.5 - 15.5 g/dL    Hematocrit 28.3 (L) 34.0 - 48.0 %    MCV 92.2 80.0 - 99.9 fL    MCH 27.7 26.0 - 35.0 pg    MCHC 30.0 (L) 32.0 - 34.5 %    RDW 14.6 11.5 - 15.0 fL    Platelets 700 403 - 259 E9/L    MPV 10.7 7.0 - 12.0 fL    Neutrophils % 72.1 43.0 - 80.0 %    Immature Granulocytes % 0.5 0.0 - 5.0 %    Lymphocytes % 18.5 (L) 20.0 - 42.0 %    Monocytes % 6.9 2.0 - 12.0 %    Eosinophils % 1.5 0.0 - 6.0 %    Basophils % 0.5 0.0 - 2.0 %    Neutrophils Absolute 4.80 1.80 - 7.30 E9/L    Immature Granulocytes # 0.03 E9/L    Lymphocytes Absolute 1.23 (L) 1.50 - 4.00 E9/L    Monocytes Absolute 0.46 0.10 - 0.95 E9/L    Eosinophils Absolute 0.10 0.05 - 0.50 E9/L    Basophils Absolute 0.03 0.00 - 0.20 E9/L   Comprehensive Metabolic Panel w/ Reflex to MG   Result Value Ref Range    Sodium 141 132 - 146 mmol/L    Potassium reflex Magnesium 5.2 (H) 3.5 - 5.0 mmol/L    Chloride 106 98 - 107 mmol/L    CO2 26 22 - 29 mmol/L    Anion Gap 9 7 - 16 mmol/L    Glucose 464 (H) 74 - 99 mg/dL    BUN 29 (H) 6 - 23 mg/dL    CREATININE 0.7 0.5 - 1.0 mg/dL    GFR Non-African American >60 >=60 mL/min/1.73    GFR African American >60     Calcium 8.5 (L) 8.6 - 10.2 mg/dL    Total Protein 4.8 (L) 6.4 - 8.3 g/dL    Albumin 2.4 (L) 3.5 - 5.2 g/dL    Total Bilirubin 0.3 0.0 - 1.2 mg/dL    Alkaline Phosphatase 101 35 - 104 U/L    ALT 11 0 - 32 U/L    AST 16 0 - 31 U/L   Lactate, Sepsis   Result Value Ref Range    Lactic Acid, Sepsis 1.0 0.5 - 1.9 mmol/L   Lipase   Result Value Ref Range    Lipase 13 13 - 60 U/L   Troponin   Result Value Ref Range    Troponin, High Sensitivity 43 (H) 0 - 9 ng/L   Urinalysis, reflex to microscopic   Result Value Ref Range    Color, UA Yellow Straw/Yellow    Clarity, UA SL CLOUDY Clear    Glucose, Ur Negative Negative mg/dL    Bilirubin Urine Negative Negative    Ketones, Urine Negative Negative mg/dL    Specific Gravity, UA 1.015 1.005 - 1.030    Blood, Urine Negative Negative    pH, UA 7.0 5.0 - 9.0    Protein, UA Negative Negative mg/dL    Urobilinogen, Urine 0.2 <2.0 E.U./dL    Nitrite, Urine Negative Negative    Leukocyte Esterase, Urine Negative Negative   Troponin   Result Value Ref Range    Troponin, High Sensitivity 43 (H) 0 - 9 ng/L   Microscopic Urinalysis   Result Value Ref Range    WBC, UA 0-1 0 - 5 /HPF    RBC, UA 1-3 0 - 2 /HPF    Epithelial Cells, UA MANY /HPF    Bacteria, UA FEW (A) None Seen /HPF   POCT Glucose   Result Value Ref Range    Glucose 106 mg/dL    QC OK? yes    POCT Glucose   Result Value Ref Range    Meter Glucose 106 (H) 74 - 99 mg/dL   POCT Glucose   Result Value Ref Range    Meter Glucose 76 74 - 99 mg/dL   POCT Glucose   Result Value Ref Range    Meter Glucose 134 (H) 74 - 99 mg/dL   EKG 12 Lead   Result Value Ref Range    Ventricular Rate 105 BPM    Atrial Rate 105 BPM    P-R Interval 142 ms    QRS Duration 70 ms    Q-T Interval 364 ms    QTc Calculation (Bazett) 481 ms    P Axis 42 degrees    R Axis -26 degrees    T Axis -41 degrees       Radiology  CT ABDOMEN PELVIS W IV CONTRAST Additional Contrast? None   Final Result   6.0 cm x 4.5 cm hour glass shaped abscess just deep to the supraumbilical   ventral abdominal wall incision.       The gastrostomy tube balloon is partially located within the left rectus   abdominus wall musculature and partially within the wall and lumen of the stomach. Small left pleural effusion with adjacent atelectasis.                 ------------------------- NURSING NOTES AND VITALS REVIEWED ---------------------------  Date / Time Roomed:  10/8/2021 10:57 PM  ED Bed Assignment:  10/10    The nursing notes within the ED encounter and vital signs as below have been reviewed. Patient Vitals for the past 24 hrs:   BP Temp Temp src Pulse Resp SpO2 Height Weight   10/09/21 0708 119/60 -- -- 96 18 100 % -- --   10/09/21 0512 (!) 109/58 -- -- 90 19 97 % -- --   10/09/21 0433 117/72 -- -- -- 19 99 % -- --   10/09/21 0418 134/68 -- -- -- 20 97 % -- --   10/09/21 0329 103/67 -- -- 87 16 99 % -- --   10/09/21 0308 (!) 81/43 -- -- 89 16 99 % -- --   10/09/21 0235 (!) 112/44 -- -- 92 18 98 % -- --   10/09/21 0205 (!) 110/59 -- -- 93 18 98 % -- --   10/09/21 0130 (!) 91/38 98.5 °F (36.9 °C) Oral 93 18 100 % -- --   10/09/21 0045 106/70 -- -- 102 18 95 % -- --   10/09/21 0033 (!) 102/44 -- -- -- -- -- -- --   10/08/21 2311 -- -- -- 99 -- -- -- --   10/08/21 2309 (!) 93/55 98.8 °F (37.1 °C) Oral -- 18 98 % 5' 7\" (1.702 m) 185 lb (83.9 kg)       Oxygen Saturation Interpretation: Normal        --------------------------------- ADDITIONAL PROVIDER NOTES ---------------------------------    Clinical Impression  1. Abdominal wall abscess    2. Hypotension, unspecified hypotension type    3. Hyperkalemia    4. Hyperglycemia    5. Dislodged gastrostomy tube          Disposition  Patient's disposition: Transfer to [facility] for higher level of care  Patient's condition is stable.        Abhishek Roblero MD  10/09/21 9009

## 2021-10-09 NOTE — ED NOTES
One set of blood cultures drawn from PICC line, as ordered by Dr. Christopher Chaney.       Kim Bautista RN  10/09/21 0120

## 2021-10-09 NOTE — ED NOTES
@[id number]  The [facility] going to  [facility], bed number H71 BED 24 number for report is [address] [place] [address]  10/09/21 3898

## 2021-10-10 VITALS
HEART RATE: 96 BPM | DIASTOLIC BLOOD PRESSURE: 65 MMHG | TEMPERATURE: 97.8 F | OXYGEN SATURATION: 96 % | HEIGHT: 67 IN | SYSTOLIC BLOOD PRESSURE: 116 MMHG | WEIGHT: 185 LBS | BODY MASS INDEX: 29.03 KG/M2 | RESPIRATION RATE: 16 BRPM

## 2021-10-10 LAB
EKG ATRIAL RATE: 105 BPM
EKG P AXIS: 42 DEGREES
EKG P-R INTERVAL: 142 MS
EKG Q-T INTERVAL: 364 MS
EKG QRS DURATION: 70 MS
EKG QTC CALCULATION (BAZETT): 481 MS
EKG R AXIS: -26 DEGREES
EKG T AXIS: -41 DEGREES
EKG VENTRICULAR RATE: 105 BPM

## 2021-10-10 PROCEDURE — 6360000002 HC RX W HCPCS

## 2021-10-10 RX ORDER — MORPHINE SULFATE 2 MG/ML
INJECTION, SOLUTION INTRAMUSCULAR; INTRAVENOUS
Status: COMPLETED
Start: 2021-10-10 | End: 2021-10-10

## 2021-10-10 RX ORDER — MORPHINE SULFATE 4 MG/ML
4 INJECTION, SOLUTION INTRAMUSCULAR; INTRAVENOUS ONCE
Status: DISCONTINUED | OUTPATIENT
Start: 2021-10-10 | End: 2021-10-10 | Stop reason: HOSPADM

## 2021-10-10 RX ADMIN — MORPHINE SULFATE 4 MG: 2 INJECTION, SOLUTION INTRAMUSCULAR; INTRAVENOUS at 01:35

## 2021-10-10 ASSESSMENT — PAIN SCALES - GENERAL
PAINLEVEL_OUTOF10: 7
PAINLEVEL_OUTOF10: 7

## 2021-10-10 NOTE — ED NOTES
Patient had episode of urinary incontinence, linen changed and brief applied to patient.      Raul Wen, BERONICA  10/09/21 7622

## 2021-10-10 NOTE — ED NOTES
Nurse to Nurse called to [facility] to [facility] at [phone] regarding bed 24 on H71.      Jaz Amaya RN  10/09/21 7440

## 2021-10-14 LAB
BLOOD CULTURE, ROUTINE: NORMAL
CULTURE, BLOOD 2: NORMAL

## 2021-10-23 ENCOUNTER — HOSPITAL ENCOUNTER (OUTPATIENT)
Dept: GENERAL RADIOLOGY | Age: 76
Discharge: HOME | DRG: 393 | End: 2021-10-25
Payer: MEDICARE

## 2021-10-23 ENCOUNTER — APPOINTMENT (OUTPATIENT)
Dept: CT IMAGING | Age: 76
DRG: 393 | End: 2021-10-23
Payer: MEDICARE

## 2021-10-23 ENCOUNTER — TELEPHONE (OUTPATIENT)
Dept: SURGERY | Age: 76
End: 2021-10-23

## 2021-10-23 ENCOUNTER — HOSPITAL ENCOUNTER (INPATIENT)
Age: 76
LOS: 4 days | Discharge: HOME | DRG: 393 | End: 2021-10-27
Attending: EMERGENCY MEDICINE | Admitting: INTERNAL MEDICINE
Payer: MEDICARE

## 2021-10-23 ENCOUNTER — APPOINTMENT (OUTPATIENT)
Dept: GENERAL RADIOLOGY | Age: 76
DRG: 393 | End: 2021-10-23
Payer: MEDICARE

## 2021-10-23 ENCOUNTER — HOSPITAL ENCOUNTER (OUTPATIENT)
Age: 76
Discharge: HOME | DRG: 393 | End: 2021-10-25
Payer: MEDICARE

## 2021-10-23 DIAGNOSIS — K94.23 PEG TUBE MALFUNCTION (HCC): Primary | ICD-10-CM

## 2021-10-23 DIAGNOSIS — R11.14 BILIOUS VOMITING WITH NAUSEA: ICD-10-CM

## 2021-10-23 DIAGNOSIS — R11.14 BILIOUS VOMITING WITH NAUSEA: Primary | ICD-10-CM

## 2021-10-23 LAB
ALBUMIN SERPL-MCNC: 2.8 G/DL (ref 3.5–5.2)
ALP BLD-CCNC: 124 U/L (ref 35–104)
ALT SERPL-CCNC: 18 U/L (ref 0–32)
ANION GAP SERPL CALCULATED.3IONS-SCNC: 10 MMOL/L (ref 7–16)
AST SERPL-CCNC: 32 U/L (ref 0–31)
BASOPHILS ABSOLUTE: 0.04 E9/L (ref 0–0.2)
BASOPHILS RELATIVE PERCENT: 0.7 % (ref 0–2)
BILIRUB SERPL-MCNC: 0.3 MG/DL (ref 0–1.2)
BUN BLDV-MCNC: 20 MG/DL (ref 6–23)
CALCIUM SERPL-MCNC: 9.2 MG/DL (ref 8.6–10.2)
CHLORIDE BLD-SCNC: 92 MMOL/L (ref 98–107)
CO2: 31 MMOL/L (ref 22–29)
CREAT SERPL-MCNC: 0.7 MG/DL (ref 0.5–1)
EOSINOPHILS ABSOLUTE: 0.07 E9/L (ref 0.05–0.5)
EOSINOPHILS RELATIVE PERCENT: 1.3 % (ref 0–6)
GFR AFRICAN AMERICAN: >60
GFR NON-AFRICAN AMERICAN: >60 ML/MIN/1.73
GLUCOSE BLD-MCNC: 161 MG/DL (ref 74–99)
HCT VFR BLD CALC: 33.8 % (ref 34–48)
HEMOGLOBIN: 10.3 G/DL (ref 11.5–15.5)
IMMATURE GRANULOCYTES #: 0.02 E9/L
IMMATURE GRANULOCYTES %: 0.4 % (ref 0–5)
LIPASE: 10 U/L (ref 13–60)
LYMPHOCYTES ABSOLUTE: 1.62 E9/L (ref 1.5–4)
LYMPHOCYTES RELATIVE PERCENT: 30.3 % (ref 20–42)
MCH RBC QN AUTO: 25.8 PG (ref 26–35)
MCHC RBC AUTO-ENTMCNC: 30.5 % (ref 32–34.5)
MCV RBC AUTO: 84.5 FL (ref 80–99.9)
MONOCYTES ABSOLUTE: 0.44 E9/L (ref 0.1–0.95)
MONOCYTES RELATIVE PERCENT: 8.2 % (ref 2–12)
NEUTROPHILS ABSOLUTE: 3.15 E9/L (ref 1.8–7.3)
NEUTROPHILS RELATIVE PERCENT: 59.1 % (ref 43–80)
PDW BLD-RTO: 14.8 FL (ref 11.5–15)
PLATELET # BLD: 415 E9/L (ref 130–450)
PMV BLD AUTO: 9.8 FL (ref 7–12)
POTASSIUM REFLEX MAGNESIUM: 3.8 MMOL/L (ref 3.5–5)
RBC # BLD: 4 E12/L (ref 3.5–5.5)
SARS-COV-2, NAAT: NOT DETECTED
SODIUM BLD-SCNC: 133 MMOL/L (ref 132–146)
TOTAL PROTEIN: 5.8 G/DL (ref 6.4–8.3)
TROPONIN, HIGH SENSITIVITY: 27 NG/L (ref 0–9)
TROPONIN, HIGH SENSITIVITY: 27 NG/L (ref 0–9)
WBC # BLD: 5.3 E9/L (ref 4.5–11.5)

## 2021-10-23 PROCEDURE — 80053 COMPREHEN METABOLIC PANEL: CPT

## 2021-10-23 PROCEDURE — 84484 ASSAY OF TROPONIN QUANT: CPT

## 2021-10-23 PROCEDURE — 83690 ASSAY OF LIPASE: CPT

## 2021-10-23 PROCEDURE — 87635 SARS-COV-2 COVID-19 AMP PRB: CPT

## 2021-10-23 PROCEDURE — 6360000002 HC RX W HCPCS

## 2021-10-23 PROCEDURE — 36415 COLL VENOUS BLD VENIPUNCTURE: CPT

## 2021-10-23 PROCEDURE — 74018 RADEX ABDOMEN 1 VIEW: CPT

## 2021-10-23 PROCEDURE — 71275 CT ANGIOGRAPHY CHEST: CPT

## 2021-10-23 PROCEDURE — 2580000003 HC RX 258: Performed by: RADIOLOGY

## 2021-10-23 PROCEDURE — 85025 COMPLETE CBC W/AUTO DIFF WBC: CPT

## 2021-10-23 PROCEDURE — 6360000004 HC RX CONTRAST MEDICATION

## 2021-10-23 PROCEDURE — 96374 THER/PROPH/DIAG INJ IV PUSH: CPT

## 2021-10-23 PROCEDURE — 99284 EMERGENCY DEPT VISIT MOD MDM: CPT

## 2021-10-23 PROCEDURE — 2060000000 HC ICU INTERMEDIATE R&B

## 2021-10-23 PROCEDURE — 2580000003 HC RX 258: Performed by: PHYSICIAN ASSISTANT

## 2021-10-23 PROCEDURE — 6360000004 HC RX CONTRAST MEDICATION: Performed by: RADIOLOGY

## 2021-10-23 PROCEDURE — 93005 ELECTROCARDIOGRAM TRACING: CPT

## 2021-10-23 RX ORDER — PANTOPRAZOLE SODIUM 40 MG/10ML
40 INJECTION, POWDER, LYOPHILIZED, FOR SOLUTION INTRAVENOUS DAILY
Status: DISCONTINUED | OUTPATIENT
Start: 2021-10-24 | End: 2021-10-27 | Stop reason: HOSPADM

## 2021-10-23 RX ORDER — SODIUM CHLORIDE 0.9 % (FLUSH) 0.9 %
10 SYRINGE (ML) INJECTION EVERY 12 HOURS SCHEDULED
Status: DISCONTINUED | OUTPATIENT
Start: 2021-10-23 | End: 2021-10-27 | Stop reason: HOSPADM

## 2021-10-23 RX ORDER — POTASSIUM CHLORIDE 20 MEQ/1
40 TABLET, EXTENDED RELEASE ORAL PRN
Status: DISCONTINUED | OUTPATIENT
Start: 2021-10-23 | End: 2021-10-27 | Stop reason: HOSPADM

## 2021-10-23 RX ORDER — METOCLOPRAMIDE HYDROCHLORIDE 5 MG/ML
10 INJECTION INTRAMUSCULAR; INTRAVENOUS EVERY 6 HOURS PRN
Status: DISCONTINUED | OUTPATIENT
Start: 2021-10-23 | End: 2021-10-27 | Stop reason: HOSPADM

## 2021-10-23 RX ORDER — SODIUM CHLORIDE 0.9 % (FLUSH) 0.9 %
10 SYRINGE (ML) INJECTION PRN
Status: COMPLETED | OUTPATIENT
Start: 2021-10-23 | End: 2021-10-23

## 2021-10-23 RX ORDER — POLYETHYLENE GLYCOL 3350 17 G/17G
17 POWDER, FOR SOLUTION ORAL DAILY PRN
Status: DISCONTINUED | OUTPATIENT
Start: 2021-10-23 | End: 2021-10-27 | Stop reason: HOSPADM

## 2021-10-23 RX ORDER — ACETAMINOPHEN 325 MG/1
650 TABLET ORAL EVERY 6 HOURS PRN
Status: DISCONTINUED | OUTPATIENT
Start: 2021-10-23 | End: 2021-10-27 | Stop reason: HOSPADM

## 2021-10-23 RX ORDER — ACETAMINOPHEN 650 MG/1
650 SUPPOSITORY RECTAL EVERY 6 HOURS PRN
Status: DISCONTINUED | OUTPATIENT
Start: 2021-10-23 | End: 2021-10-27 | Stop reason: HOSPADM

## 2021-10-23 RX ORDER — SODIUM CHLORIDE 0.9 % (FLUSH) 0.9 %
10 SYRINGE (ML) INJECTION PRN
Status: DISCONTINUED | OUTPATIENT
Start: 2021-10-23 | End: 2021-10-27 | Stop reason: HOSPADM

## 2021-10-23 RX ORDER — ONDANSETRON 2 MG/ML
4 INJECTION INTRAMUSCULAR; INTRAVENOUS EVERY 6 HOURS PRN
Status: DISCONTINUED | OUTPATIENT
Start: 2021-10-23 | End: 2021-10-27 | Stop reason: HOSPADM

## 2021-10-23 RX ORDER — METOCLOPRAMIDE HYDROCHLORIDE 5 MG/ML
10 INJECTION INTRAMUSCULAR; INTRAVENOUS ONCE
Status: COMPLETED | OUTPATIENT
Start: 2021-10-23 | End: 2021-10-23

## 2021-10-23 RX ORDER — POTASSIUM CHLORIDE 7.45 MG/ML
10 INJECTION INTRAVENOUS PRN
Status: DISCONTINUED | OUTPATIENT
Start: 2021-10-23 | End: 2021-10-27 | Stop reason: HOSPADM

## 2021-10-23 RX ORDER — SODIUM CHLORIDE 9 MG/ML
INJECTION, SOLUTION INTRAVENOUS CONTINUOUS
Status: DISCONTINUED | OUTPATIENT
Start: 2021-10-23 | End: 2021-10-24

## 2021-10-23 RX ORDER — SODIUM CHLORIDE 9 MG/ML
25 INJECTION, SOLUTION INTRAVENOUS PRN
Status: DISCONTINUED | OUTPATIENT
Start: 2021-10-23 | End: 2021-10-27 | Stop reason: HOSPADM

## 2021-10-23 RX ORDER — ONDANSETRON 4 MG/1
4 TABLET, ORALLY DISINTEGRATING ORAL EVERY 8 HOURS PRN
Status: DISCONTINUED | OUTPATIENT
Start: 2021-10-23 | End: 2021-10-27 | Stop reason: HOSPADM

## 2021-10-23 RX ADMIN — SODIUM CHLORIDE: 9 INJECTION, SOLUTION INTRAVENOUS at 23:10

## 2021-10-23 RX ADMIN — SODIUM CHLORIDE, PRESERVATIVE FREE 10 ML: 5 INJECTION INTRAVENOUS at 23:11

## 2021-10-23 RX ADMIN — Medication 10 ML: at 18:49

## 2021-10-23 RX ADMIN — METOCLOPRAMIDE HYDROCHLORIDE 10 MG: 5 INJECTION INTRAMUSCULAR; INTRAVENOUS at 18:34

## 2021-10-23 RX ADMIN — IOPAMIDOL 75 ML: 755 INJECTION, SOLUTION INTRAVENOUS at 18:53

## 2021-10-23 RX ADMIN — DIATRIZOATE MEGLUMINE AND DIATRIZOATE SODIUM 60 ML: 600; 100 SOLUTION ORAL; RECTAL at 18:35

## 2021-10-23 ASSESSMENT — ENCOUNTER SYMPTOMS
BLOOD IN STOOL: 0
VOMITING: 0
NAUSEA: 1
WHEEZING: 0
DIARRHEA: 0
CHEST TIGHTNESS: 0
SHORTNESS OF BREATH: 0
CONSTIPATION: 0
COUGH: 0
ABDOMINAL PAIN: 0
BACK PAIN: 0

## 2021-10-23 ASSESSMENT — PAIN SCALES - GENERAL: PAINLEVEL_OUTOF10: 0

## 2021-10-23 NOTE — ED PROVIDER NOTES
[address]       Pt Name: Radha Patient  MRN: [id number]  [place] 1945  Date of evaluation: 10/23/2021      CHIEF COMPLAINT       Chief Complaint   Patient presents with    Other     sent by [facility] for admission, unsure if feeding tube placement is correct position        HPI  Radha Patient is a 67 y.o. female  s/p Whipple at [facility] 09/09/21. She was recently at [facility] due to complications of her surgery and was discharged yesterday. Since discharge she has been unable to tolerate any tube feeds and has been having drainage from G tube approximately 1 hour after feeding, daughter is bedside and states that the drainage looks exactly like the pain. Patient has significant nausea right after feeds which is not alleviated after Reglan. She also has bilateral DVT including clinically and has been given Lovenox twice a day, she states that prior to discharge they obtained an ultrasound and noticed the DVT had decreased in size. patient denies any shortness of breath, however has been saturating in low 90s often dropping to 88% at home, patient is not on any oxygen at home. Denies any fever, chills, headache, dizziness, weakness, cp, palpitations, sob, cough, abd pain, dysuria, hematuria, diarrhea, constipation. Family no longer wants to bring patient to [facility] so they spoke to Dr. [facility], who recommended patient come to the emergency room to confirm placement of tube and he will follow if admission is needed. Except as noted above the remainder of the review of systems was reviewed and negative. Review of Systems   Constitutional: Negative for activity change, appetite change, chills, fatigue and fever. HENT: Negative for congestion, nosebleeds and tinnitus. Eyes: Negative for visual disturbance. Respiratory: Negative for cough, chest tightness, shortness of breath and wheezing.     Cardiovascular: Negative for chest pain, palpitations and leg swelling. Gastrointestinal: Positive for nausea. Negative for abdominal pain, blood in stool, constipation, diarrhea and vomiting. Endocrine: Negative for polydipsia and polyphagia. Genitourinary: Negative for dysuria, flank pain, hematuria, vaginal bleeding, vaginal discharge and vaginal pain. Musculoskeletal: Negative for back pain, gait problem, joint swelling and myalgias. Skin: Negative for rash. Allergic/Immunologic: Negative for immunocompromised state. Neurological: Negative for dizziness, syncope, weakness, numbness and headaches. Hematological: Negative for adenopathy. Psychiatric/Behavioral: Negative for behavioral problems, confusion and hallucinations. Physical Exam  Constitutional:       General: She is not in acute distress. Appearance: Normal appearance. She is normal weight. She is not ill-appearing, toxic-appearing or diaphoretic. HENT:      Head: Normocephalic and atraumatic. Right Ear: External ear normal.      Left Ear: External ear normal.      Nose: Nose normal. No congestion or rhinorrhea. Mouth/Throat:      Mouth: Mucous membranes are moist.      Pharynx: Oropharynx is clear. No oropharyngeal exudate or posterior oropharyngeal erythema. Eyes:      Conjunctiva/sclera: Conjunctivae normal.      Pupils: Pupils are equal, round, and reactive to light. Cardiovascular:      Rate and Rhythm: Normal rate and regular rhythm. Pulses: Normal pulses. Heart sounds: Normal heart sounds. Pulmonary:      Effort: Pulmonary effort is normal.      Breath sounds: Normal breath sounds. Abdominal:      General: Abdomen is flat. A surgical scar is present. Bowel sounds are normal. There is no distension. Palpations: Abdomen is soft. There is no mass. Tenderness: There is no abdominal tenderness. There is no right CVA tenderness, left CVA tenderness or guarding. Hernia: No hernia is present.       Comments: G-tube- no discharge, erythema or signs of infection. Musculoskeletal:      Cervical back: Normal range of motion. Skin:     General: Skin is warm and dry. Capillary Refill: Capillary refill takes less than 2 seconds. Neurological:      General: No focal deficit present. Mental Status: She is alert and oriented to person, place, and time. Mental status is at baseline. Psychiatric:         Mood and Affect: Mood normal.         Behavior: Behavior normal.         Thought Content: Thought content normal.          Procedures     MDM    67 y.o. female  s/p Shahbaz at [facility] [place] 09/09/21. She was recently at [facility] due to complications of her surgery and was discharged yesterday. Since discharge she has been unable to tolerate any tube feeds and has been having drainage from G tube approximately 1 hour after feeding, daughter is bedside and states that the drainage looks exactly like the pain. On arrival patient was hypoxic saturating 88% during initial exam, patient was put in 2 L with significant improvement of her oxygen saturation. She was otherwise hemodynamically stable throughout her stay in the ED. Physical exam remarkable for GJ tube, with no discharge erythema or signs of infection, abdomen was soft nontender nondistended with healing surgical scar in the midline. Labs remarkable for normal electrolytes, trop initially 27 but not trending up. EKG normal sinus, no signs of acute ischemia. Xray abdomen remarkable for misplacement of J tube as contrast was injected through J tube , with no signs of contrast in the SI confirming misplacement of GJ tube. Dr Reyes Best contacted about results and he will follow patient during this admission. Patient given fluids and reglan while in the ED.  Given patient's sudden oxygen requirement and history of DVT, CTA obtained which was negative for any pulmonary embolism but remarkable for marked left pleural effusion, also remarkable for cysts that are likely due to scarring that has been unchanged since previous CTA. Patient and family was told about incidental findings and recommended to follow up pulmonologist in 6 to 12 months. Patient admitted to Dr. Arnold Monge.         --------------------------------------------- PAST HISTORY ---------------------------------------------  Past Medical History:  has a past medical history of Depression, GERD (gastroesophageal reflux disease), and Hypertension. Past Surgical History:  has a past surgical history that includes Pancreas surgery (09/09/2021) and Gastrostomy tube placement (N/A, 10/6/2021). Social History:  reports that she has never smoked. She has never used smokeless tobacco. She reports previous alcohol use. She reports that she does not use drugs. Family History: family history is not on file. The patients home medications have been reviewed. Allergies: Patient has no known allergies.     -------------------------------------------------- RESULTS -------------------------------------------------    LABS:  Results for orders placed or performed during the hospital encounter of 10/23/21   COVID-19, Rapid    Specimen: Nasopharyngeal Swab   Result Value Ref Range    SARS-CoV-2, NAAT Not Detected Not Detected   CBC Auto Differential   Result Value Ref Range    WBC 5.3 4.5 - 11.5 E9/L    RBC 4.00 3.50 - 5.50 E12/L    Hemoglobin 10.3 (L) 11.5 - 15.5 g/dL    Hematocrit 33.8 (L) 34.0 - 48.0 %    MCV 84.5 80.0 - 99.9 fL    MCH 25.8 (L) 26.0 - 35.0 pg    MCHC 30.5 (L) 32.0 - 34.5 %    RDW 14.8 11.5 - 15.0 fL    Platelets 128 480 - 875 E9/L    MPV 9.8 7.0 - 12.0 fL    Neutrophils % 59.1 43.0 - 80.0 %    Immature Granulocytes % 0.4 0.0 - 5.0 %    Lymphocytes % 30.3 20.0 - 42.0 %    Monocytes % 8.2 2.0 - 12.0 %    Eosinophils % 1.3 0.0 - 6.0 %    Basophils % 0.7 0.0 - 2.0 %    Neutrophils Absolute 3.15 1.80 - 7.30 E9/L    Immature Granulocytes # 0.02 E9/L    Lymphocytes Absolute 1.62 1.50 - 4.00 E9/L    Monocytes Absolute 0.44 0.10 - 0.95 E9/L Eosinophils Absolute 0.07 0.05 - 0.50 E9/L    Basophils Absolute 0.04 0.00 - 0.20 E9/L   Comprehensive Metabolic Panel w/ Reflex to MG   Result Value Ref Range    Sodium 133 132 - 146 mmol/L    Potassium reflex Magnesium 3.8 3.5 - 5.0 mmol/L    Chloride 92 (L) 98 - 107 mmol/L    CO2 31 (H) 22 - 29 mmol/L    Anion Gap 10 7 - 16 mmol/L    Glucose 161 (H) 74 - 99 mg/dL    BUN 20 6 - 23 mg/dL    CREATININE 0.7 0.5 - 1.0 mg/dL    GFR Non-African American >60 >=60 mL/min/1.73    GFR African American >60     Calcium 9.2 8.6 - 10.2 mg/dL    Total Protein 5.8 (L) 6.4 - 8.3 g/dL    Albumin 2.8 (L) 3.5 - 5.2 g/dL    Total Bilirubin 0.3 0.0 - 1.2 mg/dL    Alkaline Phosphatase 124 (H) 35 - 104 U/L    ALT 18 0 - 32 U/L    AST 32 (H) 0 - 31 U/L   Lipase   Result Value Ref Range    Lipase 10 (L) 13 - 60 U/L   Troponin   Result Value Ref Range    Troponin, High Sensitivity 27 (H) 0 - 9 ng/L   Troponin   Result Value Ref Range    Troponin, High Sensitivity 27 (H) 0 - 9 ng/L   EKG 12 Lead   Result Value Ref Range    Ventricular Rate 99 BPM    Atrial Rate 99 BPM    P-R Interval 134 ms    QRS Duration 72 ms    Q-T Interval 270 ms    QTc Calculation (Bazett) 346 ms    P Axis 22 degrees    R Axis -26 degrees    T Axis 159 degrees       RADIOLOGY:  CTA PULMONARY W CONTRAST   Final Result   1. No evidence of pulmonary embolism. 2. Moderate left pleural effusion. 3. Subsegmental atelectasis in left lower lobe. 4. Peribronchial thickening suggesting acute or chronic bronchitis. 5. Stable irregular nodule in right lung apex may represent focal fibrosis. 6. Additional subcentimeter nodules in superior segment right lower lobe and   right middle lobe also appears similar in size compared to prior. Follow-up   recommended as indicated below.       RECOMMENDATIONS:   Fleischner Society guidelines for follow-up and management of incidentally   detected pulmonary nodules:      Single Solid Nodule:      Nodule size less than 6 mm   In a low-risk patient, no routine follow-up. In a high-risk patient, optional CT at 12 months. Nodule size equals 6-8 mm   In a low-risk patient, CT at 6-12 months, then consider CT at 18-24 months. In a high-risk patient, CT at 6-12 months, then CT at 18-24 months. Nodule size greater than 8 mm         In a low-risk patient, consider CT at 3 months, PET/CT, or tissue sampling. In a high-risk patient, consider CT at 3 months, PET/CT, or tissue sampling. Multiple Solid Nodules:      Nodule size less than 6 mm   In a low-risk patient, no routine follow-up. In a high-risk patient, optional CT at 12 months. Nodule size equals 6-8 mm   In a low-risk patient, CT at 3-6 months, then consider CT at 18-24 months. In a high-risk patient, CT at 3-6 months, then CT at 18-24 months. Nodule size greater than 8 mm   In a low-risk patient, CT at 3-6 months, then consider CT at 18-24 months. In a high-risk patient, CT at 3-6 months, then CT at 18-24 months. - Low risk patients include individuals with minimal or absent history of   smoking and other known risk factors. - High risk patients include individuals with a history or smoking or known   risk factors. Radiology 2017 http://pubs. rsna.org/doi/full/10.1148/radiol. 7546937628         XR ABDOMEN FOR NG/OG/NE TUBE PLACEMENT   Final Result   Satisfactory position of gastric PEG tube. EKG:  This EKG is signed and interpreted by me. Rate: 99  Rhythm: Sinus and few PVCs  Interpretation: no acute changes  Comparison: stable as compared to patient's most recent EKG      ------------------------- NURSING NOTES AND VITALS REVIEWED ---------------------------  Date / Time Roomed:  10/23/2021  4:47 PM  ED Bed Assignment:  [id number]-P    The nursing notes within the ED encounter and vital signs as below have been reviewed.      Patient Vitals for the past 24 hrs:   BP Temp Temp src Pulse Resp SpO2 Height Weight   10/23/21 4999 (!) 117/55 98 °F (36.7 °C) Oral 97 16 93 % -- --   10/23/21 2057 127/70 98.3 °F (36.8 °C) Oral 99 16 95 % -- --   10/23/21 1837 118/66 -- -- 101 16 96 % -- --   10/23/21 1648 (!) 120/58 97.4 °F (36.3 °C) Temporal 100 16 92 % 5' 7\" (1.702 m) 172 lb (78 kg)       Oxygen Saturation Interpretation: Improved after treatment    ------------------------------------------ PROGRESS NOTES ------------------------------------------  Re-evaluation(s):  Time: 0545  Patients symptoms show no change  Repeat physical examination is not changed    Counseling:  I have spoken with the patient and discussed todays results, in addition to providing specific details for the plan of care and counseling regarding the diagnosis and prognosis. Their questions are answered at this time and they are agreeable with the plan of admission.    --------------------------------- ADDITIONAL PROVIDER NOTES ---------------------------------  Consultations:  Time: 2008. Spoke with Dr. Osvaldo Mathew. Discussed case. They will admit the patient. This patient's ED course included: a personal history and physicial examination, re-evaluation prior to disposition, multiple bedside re-evaluations, cardiac monitoring and continuous pulse oximetry    This patient has remained hemodynamically stable during their ED course. Diagnosis:  1. PEG tube malfunction (HCC)        Disposition:  Patient's disposition: Admit to med/surg floor  Patient's condition is stable.          Shanda Avilez MD  Resident  10/24/21 7004

## 2021-10-24 ENCOUNTER — ANESTHESIA (OUTPATIENT)
Dept: OPERATING ROOM | Age: 76
DRG: 393 | End: 2021-10-24
Payer: MEDICARE

## 2021-10-24 ENCOUNTER — ANESTHESIA EVENT (OUTPATIENT)
Dept: OPERATING ROOM | Age: 76
DRG: 393 | End: 2021-10-24
Payer: MEDICARE

## 2021-10-24 VITALS — SYSTOLIC BLOOD PRESSURE: 83 MMHG | DIASTOLIC BLOOD PRESSURE: 48 MMHG | OXYGEN SATURATION: 92 %

## 2021-10-24 LAB
ANION GAP SERPL CALCULATED.3IONS-SCNC: 9 MMOL/L (ref 7–16)
BASOPHILS ABSOLUTE: 0.05 E9/L (ref 0–0.2)
BASOPHILS RELATIVE PERCENT: 1 % (ref 0–2)
BUN BLDV-MCNC: 21 MG/DL (ref 6–23)
CALCIUM SERPL-MCNC: 8.6 MG/DL (ref 8.6–10.2)
CHLORIDE BLD-SCNC: 94 MMOL/L (ref 98–107)
CO2: 36 MMOL/L (ref 22–29)
CREAT SERPL-MCNC: 0.7 MG/DL (ref 0.5–1)
EKG ATRIAL RATE: 99 BPM
EKG P AXIS: 22 DEGREES
EKG P-R INTERVAL: 134 MS
EKG Q-T INTERVAL: 270 MS
EKG QRS DURATION: 72 MS
EKG QTC CALCULATION (BAZETT): 346 MS
EKG R AXIS: -26 DEGREES
EKG T AXIS: 159 DEGREES
EKG VENTRICULAR RATE: 99 BPM
EOSINOPHILS ABSOLUTE: 0.08 E9/L (ref 0.05–0.5)
EOSINOPHILS RELATIVE PERCENT: 1.6 % (ref 0–6)
GFR AFRICAN AMERICAN: >60
GFR NON-AFRICAN AMERICAN: >60 ML/MIN/1.73
GLUCOSE BLD-MCNC: 121 MG/DL (ref 74–99)
HCT VFR BLD CALC: 30.8 % (ref 34–48)
HEMOGLOBIN: 9.3 G/DL (ref 11.5–15.5)
IMMATURE GRANULOCYTES #: 0.02 E9/L
IMMATURE GRANULOCYTES %: 0.4 % (ref 0–5)
LYMPHOCYTES ABSOLUTE: 1.52 E9/L (ref 1.5–4)
LYMPHOCYTES RELATIVE PERCENT: 29.9 % (ref 20–42)
MCH RBC QN AUTO: 25.5 PG (ref 26–35)
MCHC RBC AUTO-ENTMCNC: 30.2 % (ref 32–34.5)
MCV RBC AUTO: 84.4 FL (ref 80–99.9)
MONOCYTES ABSOLUTE: 0.43 E9/L (ref 0.1–0.95)
MONOCYTES RELATIVE PERCENT: 8.5 % (ref 2–12)
NEUTROPHILS ABSOLUTE: 2.98 E9/L (ref 1.8–7.3)
NEUTROPHILS RELATIVE PERCENT: 58.6 % (ref 43–80)
PDW BLD-RTO: 14.8 FL (ref 11.5–15)
PLATELET # BLD: 381 E9/L (ref 130–450)
PMV BLD AUTO: 10.4 FL (ref 7–12)
POTASSIUM REFLEX MAGNESIUM: 3.6 MMOL/L (ref 3.5–5)
RBC # BLD: 3.65 E12/L (ref 3.5–5.5)
SODIUM BLD-SCNC: 139 MMOL/L (ref 132–146)
WBC # BLD: 5.1 E9/L (ref 4.5–11.5)

## 2021-10-24 PROCEDURE — 2500000003 HC RX 250 WO HCPCS: Performed by: SURGERY

## 2021-10-24 PROCEDURE — 0DP68UZ REMOVAL OF FEEDING DEVICE FROM STOMACH, VIA NATURAL OR ARTIFICIAL OPENING ENDOSCOPIC: ICD-10-PCS | Performed by: SURGERY

## 2021-10-24 PROCEDURE — 3600007512: Performed by: SURGERY

## 2021-10-24 PROCEDURE — 85025 COMPLETE CBC W/AUTO DIFF WBC: CPT

## 2021-10-24 PROCEDURE — 89051 BODY FLUID CELL COUNT: CPT

## 2021-10-24 PROCEDURE — 6360000002 HC RX W HCPCS: Performed by: PHYSICIAN ASSISTANT

## 2021-10-24 PROCEDURE — 6360000002 HC RX W HCPCS: Performed by: SURGERY

## 2021-10-24 PROCEDURE — 2500000003 HC RX 250 WO HCPCS: Performed by: NURSE ANESTHETIST, CERTIFIED REGISTERED

## 2021-10-24 PROCEDURE — 7100000010 HC PHASE II RECOVERY - FIRST 15 MIN: Performed by: SURGERY

## 2021-10-24 PROCEDURE — 2060000000 HC ICU INTERMEDIATE R&B

## 2021-10-24 PROCEDURE — 2709999900 HC NON-CHARGEABLE SUPPLY: Performed by: SURGERY

## 2021-10-24 PROCEDURE — 2580000003 HC RX 258: Performed by: NURSE ANESTHETIST, CERTIFIED REGISTERED

## 2021-10-24 PROCEDURE — 2580000003 HC RX 258: Performed by: SURGERY

## 2021-10-24 PROCEDURE — 2720000010 HC SURG SUPPLY STERILE: Performed by: SURGERY

## 2021-10-24 PROCEDURE — 6360000002 HC RX W HCPCS: Performed by: NURSE ANESTHETIST, CERTIFIED REGISTERED

## 2021-10-24 PROCEDURE — 7100000011 HC PHASE II RECOVERY - ADDTL 15 MIN: Performed by: SURGERY

## 2021-10-24 PROCEDURE — 80048 BASIC METABOLIC PNL TOTAL CA: CPT

## 2021-10-24 PROCEDURE — C9113 INJ PANTOPRAZOLE SODIUM, VIA: HCPCS | Performed by: SURGERY

## 2021-10-24 PROCEDURE — 3600007502: Performed by: SURGERY

## 2021-10-24 PROCEDURE — 3700000000 HC ANESTHESIA ATTENDED CARE: Performed by: SURGERY

## 2021-10-24 PROCEDURE — 2700000000 HC OXYGEN THERAPY PER DAY

## 2021-10-24 PROCEDURE — 0DHA8UZ INSERTION OF FEEDING DEVICE INTO JEJUNUM, VIA NATURAL OR ARTIFICIAL OPENING ENDOSCOPIC: ICD-10-PCS | Performed by: SURGERY

## 2021-10-24 PROCEDURE — 3700000001 HC ADD 15 MINUTES (ANESTHESIA): Performed by: SURGERY

## 2021-10-24 PROCEDURE — 36415 COLL VENOUS BLD VENIPUNCTURE: CPT

## 2021-10-24 RX ORDER — SODIUM CHLORIDE 9 MG/ML
INJECTION, SOLUTION INTRAVENOUS CONTINUOUS PRN
Status: DISCONTINUED | OUTPATIENT
Start: 2021-10-24 | End: 2021-10-24 | Stop reason: SDUPTHER

## 2021-10-24 RX ORDER — PROPOFOL 10 MG/ML
INJECTION, EMULSION INTRAVENOUS PRN
Status: DISCONTINUED | OUTPATIENT
Start: 2021-10-24 | End: 2021-10-24 | Stop reason: SDUPTHER

## 2021-10-24 RX ORDER — LIDOCAINE HYDROCHLORIDE 20 MG/ML
INJECTION, SOLUTION EPIDURAL; INFILTRATION; INTRACAUDAL; PERINEURAL PRN
Status: DISCONTINUED | OUTPATIENT
Start: 2021-10-24 | End: 2021-10-24 | Stop reason: SDUPTHER

## 2021-10-24 RX ORDER — LIDOCAINE HYDROCHLORIDE 10 MG/ML
INJECTION, SOLUTION INFILTRATION; PERINEURAL PRN
Status: DISCONTINUED | OUTPATIENT
Start: 2021-10-24 | End: 2021-10-24 | Stop reason: ALTCHOICE

## 2021-10-24 RX ADMIN — PANTOPRAZOLE SODIUM 40 MG: 40 INJECTION, POWDER, FOR SOLUTION INTRAVENOUS at 11:02

## 2021-10-24 RX ADMIN — ENOXAPARIN SODIUM 80 MG: 80 INJECTION SUBCUTANEOUS at 11:37

## 2021-10-24 RX ADMIN — ONDANSETRON 4 MG: 2 INJECTION INTRAMUSCULAR; INTRAVENOUS at 08:28

## 2021-10-24 RX ADMIN — PROPOFOL 50 MG: 10 INJECTION, EMULSION INTRAVENOUS at 09:00

## 2021-10-24 RX ADMIN — PROPOFOL 20 MG: 10 INJECTION, EMULSION INTRAVENOUS at 09:05

## 2021-10-24 RX ADMIN — SODIUM CHLORIDE, PRESERVATIVE FREE 10 ML: 5 INJECTION INTRAVENOUS at 11:02

## 2021-10-24 RX ADMIN — PROPOFOL 10 MG: 10 INJECTION, EMULSION INTRAVENOUS at 09:15

## 2021-10-24 RX ADMIN — PROPOFOL 20 MG: 10 INJECTION, EMULSION INTRAVENOUS at 09:08

## 2021-10-24 RX ADMIN — PROPOFOL 20 MG: 10 INJECTION, EMULSION INTRAVENOUS at 09:10

## 2021-10-24 RX ADMIN — PROPOFOL 20 MG: 10 INJECTION, EMULSION INTRAVENOUS at 09:13

## 2021-10-24 RX ADMIN — LIDOCAINE HYDROCHLORIDE 100 MG: 20 INJECTION, SOLUTION EPIDURAL; INFILTRATION; INTRACAUDAL; PERINEURAL at 09:00

## 2021-10-24 RX ADMIN — PROPOFOL 20 MG: 10 INJECTION, EMULSION INTRAVENOUS at 09:02

## 2021-10-24 RX ADMIN — SODIUM CHLORIDE: 9 INJECTION, SOLUTION INTRAVENOUS at 07:59

## 2021-10-24 ASSESSMENT — PULMONARY FUNCTION TESTS
PIF_VALUE: 1
PIF_VALUE: 0
PIF_VALUE: 1
PIF_VALUE: 0
PIF_VALUE: 1
PIF_VALUE: 0

## 2021-10-24 ASSESSMENT — PAIN DESCRIPTION - PROGRESSION
CLINICAL_PROGRESSION: NOT CHANGED

## 2021-10-24 ASSESSMENT — PAIN SCALES - GENERAL
PAINLEVEL_OUTOF10: 0

## 2021-10-24 ASSESSMENT — PAIN DESCRIPTION - LOCATION
LOCATION: ABDOMEN

## 2021-10-24 ASSESSMENT — PAIN DESCRIPTION - PAIN TYPE
TYPE: ACUTE PAIN

## 2021-10-24 ASSESSMENT — PAIN - FUNCTIONAL ASSESSMENT: PAIN_FUNCTIONAL_ASSESSMENT: 0-10

## 2021-10-24 NOTE — ANESTHESIA PRE PROCEDURE
Department of Anesthesiology  Preprocedure Note       Name:  Anh Geiger   Age:  67 y.o.  :  1945                                          MRN:  [id number]         Date:  10/24/2021      Surgeon: Lucila Pedraza):  Jose Duncan MD    Procedure: Procedure(s):  EGD PEG TUBE PLACEMENT    Medications prior to admission:   Prior to Admission medications    Medication Sig Start Date End Date Taking? Authorizing Provider   enoxaparin (LOVENOX) 80 MG/0.8ML injection Inject 80 mg into the skin 2 times daily    Historical Provider, MD   blood glucose monitor kit and supplies Dispense sufficient amount for indicated testing frequency plus additional to accommodate PRN testing needs. Dispense all needed supplies to include: monitor, strips, lancing device, lancets, control solutions, alcohol swabs. 10/8/21   NAMRATA Madrigal CNP   metoclopramide (REGLAN) 10 MG tablet Take 1 tablet by mouth daily 10/8/21   NAMRATA Madrigal CNP   pantoprazole (PROTONIX) 40 MG tablet Take 40 mg by mouth daily 21  Historical Provider, MD       Current medications:    No current facility-administered medications for this visit. No current outpatient medications on file.      Facility-Administered Medications Ordered in Other Visits   Medication Dose Route Frequency Provider Last Rate Last Admin    diatrizoate meglumine-sodium (GASTROGRAFIN) 66-10 % solution 60 mL  60 mL Per J Tube ONCE PRN Lisa Herrera MD   60 mL at 10/23/21 1835    enoxaparin (LOVENOX) injection 80 mg  80 mg SubCUTAneous BID GUERRERO Abel        metoclopramide (REGLAN) injection 10 mg  10 mg IntraVENous Q6H PRN GUERRERO Abel        pantoprazole (PROTONIX) injection 40 mg  40 mg IntraVENous Daily Colby Abel        sodium chloride flush 0.9 % injection 10 mL  10 mL IntraVENous 2 times per day GUERRERO Abel   10 mL at 10/23/21 2311    sodium chloride flush 0.9 % injection 10 mL  10 mL IntraVENous PRN Amelia Herrera GUERRERO Cardozo        0.9 % sodium chloride infusion  25 mL IntraVENous PRN GUERRERO Soni        potassium chloride (KLOR-CON M) extended release tablet 40 mEq  40 mEq Oral PRN GUERRERO Soni        Or    potassium bicarb-citric acid (EFFER-K) effervescent tablet 40 mEq  40 mEq Oral PRN GUERRERO Soni        Or    potassium chloride 10 mEq/100 mL IVPB (Peripheral Line)  10 mEq IntraVENous PRN GUERRERO Soni        ondansetron (ZOFRAN-ODT) disintegrating tablet 4 mg  4 mg Oral Q8H PRN GUERRERO Soni        Or    ondansetron [facility]) injection 4 mg  4 mg IntraVENous Q6H PRN GUERRERO Soni        acetaminophen (TYLENOL) tablet 650 mg  650 mg Oral Q6H PRN GUERRERO Soni        Or    acetaminophen (TYLENOL) suppository 650 mg  650 mg Rectal Q6H PRN GUERRERO Soni        0.9 % sodium chloride infusion   IntraVENous Continuous GUERRERO Soni 50 mL/hr at 10/23/21 2310 New Bag at 10/23/21 2310    polyethylene glycol (GLYCOLAX) packet 17 g  17 g Oral Daily PRN GUERRERO Soni           Allergies:    No Known Allergies    Problem List:    Patient Active Problem List   Diagnosis Code    Hypoxia R09.02    Pleural effusion J90    Delayed gastric emptying K30    IPMN (intraductal papillary mucinous neoplasm) D49.0    PEG tube malfunction (HCC) K94.23       Past Medical History:        Diagnosis Date    Depression     GERD (gastroesophageal reflux disease)     Hypertension        Past Surgical History:        Procedure Laterality Date    GASTROSTOMY TUBE PLACEMENT N/A 10/6/2021    EGD PEG TUBE PLACEMENT performed by Salvador Pizarro MD at [address] [place] [address]  09/09/2021    Pylorus-Preserving Pancreaticoduodenectomy at [facility] by Dr. Ashlyn Jenkins       Social History:    Social History     Tobacco Use    Smoking status: Never Smoker    Smokeless tobacco: Never Used   Substance Use Topics    Alcohol use: Not Currently Counseling given: Not Answered      Vital Signs (Current): There were no vitals filed for this visit. BP Readings from Last 3 Encounters:   10/23/21 (!) 117/55   10/10/21 116/65   10/08/21 (!) 140/77       NPO Status:                                                                                 BMI:   Wt Readings from Last 3 Encounters:   10/23/21 172 lb (78 kg)   10/08/21 185 lb (83.9 kg)   10/08/21 195 lb 3.2 oz (88.5 kg)     There is no height or weight on file to calculate BMI.    CBC:   Lab Results   Component Value Date    WBC 5.1 10/24/2021    RBC 3.65 10/24/2021    HGB 9.3 10/24/2021    HCT 30.8 10/24/2021    MCV 84.4 10/24/2021    RDW 14.8 10/24/2021     10/24/2021       CMP:   Lab Results   Component Value Date     10/24/2021    K 3.6 10/24/2021    CL 94 10/24/2021    CO2 36 10/24/2021    BUN 21 10/24/2021    CREATININE 0.7 10/24/2021    GFRAA >60 10/24/2021    LABGLOM >60 10/24/2021    GLUCOSE 121 10/24/2021    PROT 5.8 10/23/2021    CALCIUM 8.6 10/24/2021    BILITOT 0.3 10/23/2021    ALKPHOS 124 10/23/2021    AST 32 10/23/2021    ALT 18 10/23/2021       POC Tests: No results for input(s): POCGLU, POCNA, POCK, POCCL, POCBUN, POCHEMO, POCHCT in the last 72 hours.     Coags:   Lab Results   Component Value Date    PROTIME 13.5 10/06/2021    INR 1.2 10/06/2021    APTT 26.6 10/06/2021       HCG (If Applicable): No results found for: PREGTESTUR, PREGSERUM, HCG, HCGQUANT     ABGs: No results found for: PHART, PO2ART, HYN6NNH, DNG0EWV, BEART, K0ONCSNW     Type & Screen (If Applicable):  No results found for: LABABO, LABRH    Drug/Infectious Status (If Applicable):  No results found for: HIV, HEPCAB    COVID-19 Screening (If Applicable):   Lab Results   Component Value Date    COVID19 Not Detected 10/23/2021           Anesthesia Evaluation  Patient summary reviewed no history of anesthetic complications:   Airway: Mallampati: II  TM distance: <3 FB   Neck ROM: full  Mouth opening: < 3 FB Dental:    (+) upper dentures  Comment: 2 lower pegs for dentures    Pulmonary:Negative Pulmonary ROS breath sounds clear to auscultation                             Cardiovascular:    (+) hypertension:,         Rhythm: regular  Rate: normal           Beta Blocker:  Not on Beta Blocker         Neuro/Psych:   (+) psychiatric history:            GI/Hepatic/Renal:   (+) GERD:,          ROS comment: Delayed gastric emptying   . Endo/Other:    (+) malignancy/cancer (IPMN (intraductal papillary mucinous neoplasm)). Abdominal:             Vascular: negative vascular ROS. Other Findings:               Anesthesia Plan      MAC     ASA 3       Induction: intravenous. Anesthetic plan and risks discussed with patient. Plan discussed with CRNA.             Tarik Stokes MD   10/24/2021

## 2021-10-24 NOTE — ED NOTES
SBAR faxed to unit. Spoke with Rigo Stover who confirmed SBAR was received. Pt ready for transport to [facility].      Roc Ware RN  10/23/21 3170

## 2021-10-24 NOTE — ANESTHESIA POSTPROCEDURE EVALUATION
Stable    Cardiovascular:  Stable    Hydration:  Adequate    PONV:  Stable    Post-op Pain:  Adequate analgesia    Post-op Assessment:  No apparent anesthetic complications    Additional Follow-Up / Treatment / Comment:  None    Jared Baker MD  October 24, 2021   10:37 AM

## 2021-10-24 NOTE — CONSULTS
Surgery Consult    Reason for consult: Nausea and vomiting    HPI  67 y.o. female who underwent a Whipple procedure at the [facility] in early September for a branch duct IPMN. Pylorus-preserving procedure. Patient has had problems with nausea and vomiting secondary to delayed gastric emptying. G-tube placed for venting, J-tube placed through the G-tube into the small intestine for enteral support. Recently, she returned to the [facility] when the tube in the small intestine became malpositioned. Once again the patient is not tolerating tube feeds, with all of the feeds coming out of the G-tube port. Past Medical History:   Diagnosis Date    Depression     GERD (gastroesophageal reflux disease)     Hypertension        Past Surgical History:   Procedure Laterality Date    GASTROSTOMY TUBE PLACEMENT N/A 10/6/2021    EGD PEG TUBE PLACEMENT performed by Lalit Antony MD at [address] [place] [address]  09/09/2021    Pylorus-Preserving Pancreaticoduodenectomy at [facility] by Dr. Pierre Gut       Medications Prior to Admission:    Prior to Admission medications    Medication Sig Start Date End Date Taking? Authorizing Provider   enoxaparin (LOVENOX) 80 MG/0.8ML injection Inject 80 mg into the skin 2 times daily   Yes Historical Provider, MD   metoclopramide (REGLAN) 10 MG tablet Take 1 tablet by mouth daily 10/8/21  Yes NAMRATA Barth CNP   pantoprazole (PROTONIX) 40 MG tablet Take 40 mg by mouth daily 9/28/21 12/27/21 Yes Historical Provider, MD   blood glucose monitor kit and supplies Dispense sufficient amount for indicated testing frequency plus additional to accommodate PRN testing needs. Dispense all needed supplies to include: monitor, strips, lancing device, lancets, control solutions, alcohol swabs. 10/8/21   NAMRATA Barth CNP       No Known Allergies    History reviewed. No pertinent family history.     Social History     Tobacco Use    Smoking status: Never Smoker    Smokeless tobacco: Never Used   Vaping Use    Vaping Use: Never used   Substance Use Topics    Alcohol use: Not Currently    Drug use: Never         Review of Systems:  General ROS: Negative  Hematological and Lymphatic ROS: Negative  Respiratory ROS: Negative  Cardiovascular ROS: Negative  Gastrointestinal ROS: Negative  Genito-Urinary ROS: Negative  Musculoskeletal ROS: Negative      PHYSICAL EXAM:    Vitals:    10/23/21 1837   BP: 118/66   Pulse: 101   Resp: 16   Temp:    SpO2: 96%       General Appearance:  awake, alert, oriented, in no acute distress  Skin:  Skin color, texture, turgor normal. No rashes or lesions. Head/face:  NCAT  Eyes:  No gross abnormalities. Lungs:  Normal expansion. Clear to auscultation. No rales, rhonchi, or wheezing. Heart:  Heart sounds are normal.  Regular rate and rhythm without murmur, gallop or rub. Abdomen: Soft and nondistended, G-tube with flow through J-tube left upper quadrant  Extremities: Extremities warm to touch, pink, with no edema. LABS:    CBC  Recent Labs     10/23/21  1741   WBC 5.3   HGB 10.3*   HCT 33.8*        BMP  Recent Labs     10/23/21  1741      K 3.8   CL 92*   CO2 31*   BUN 20   CREATININE 0.7   CALCIUM 9.2     Liver Function  Recent Labs     10/23/21  1741   LIPASE 10*   BILITOT 0.3   AST 32*   ALT 18   ALKPHOS 124*   PROT 5.8*   LABALBU 2.8*     No results for input(s): LACTATE in the last 72 hours. No results for input(s): INR, PTT in the last 72 hours. Invalid input(s): PT    RADIOLOGY    XR ABDOMEN (KUB) (SINGLE AP VIEW)    Result Date: 10/23/2021  EXAMINATION: ONE SUPINE XRAY VIEW(S) OF THE ABDOMEN 10/23/2021 3:45 pm COMPARISON: None. HISTORY: ORDERING SYSTEM PROVIDED HISTORY: Bilious vomiting with nausea TECHNOLOGIST PROVIDED HISTORY: Reason for exam:->check feeding tube FINDINGS: The bowel gas pattern is nonobstructive.   A percutaneous gastrostomy tube is seen a catheter is seen extending inferiorly presumably into the anastomosed jejunum. 1. Nonobstructive bowel gas pattern. 2. Catheter extending from the gastrostomy, presumably into the anastomosed jejunum. If indicated, reimaging the abdomen following injection of contrast through the catheter may be obtained to determine the site of the tip. CTA PULMONARY W CONTRAST    Result Date: 10/23/2021  EXAMINATION: CTA OF THE CHEST 10/23/2021 6:49 pm TECHNIQUE: CTA of the chest was performed after the administration of intravenous contrast.  Multiplanar reformatted images are provided for review. MIP images are provided for review. Dose modulation, iterative reconstruction, and/or weight based adjustment of the mA/kV was utilized to reduce the radiation dose to as low as reasonably achievable. COMPARISON: September 30, 2021 HISTORY: ORDERING SYSTEM PROVIDED HISTORY: hypoxia with hx of DVT TECHNOLOGIST PROVIDED HISTORY: Reason for exam:->hypoxia with hx of DVT FINDINGS: No filling defect in the pulmonary arteries to suggest pulmonary arterial embolism. Moderate size left pleural effusion with adjacent subsegmental atelectasis. Peribronchial thickening bilaterally. No pneumothorax. Stable irregular nodule located in the right lung apex measures approximately 16 by 7 mm. Stable nodule in superior segment right lower lobe measures 5 mm. Stable nodule located in right middle lobe adjacent to the minor fissure on axial image number 65 measures 4 mm calcified granuloma located in lateral aspect of right middle lobe. The heart is normal in size. No pericardial effusion. No mediastinal lymphadenopathy. 1. No evidence of pulmonary embolism. 2. Moderate left pleural effusion. 3. Subsegmental atelectasis in left lower lobe. 4. Peribronchial thickening suggesting acute or chronic bronchitis. 5. Stable irregular nodule in right lung apex may represent focal fibrosis.  6. Additional subcentimeter nodules in superior segment right lower lobe and right middle lobe also appears similar in size compared to prior. Follow-up recommended as indicated below. RECOMMENDATIONS: Fleischner Society guidelines for follow-up and management of incidentally detected pulmonary nodules: Single Solid Nodule: Nodule size less than 6 mm In a low-risk patient, no routine follow-up. In a high-risk patient, optional CT at 12 months. Nodule size equals 6-8 mm In a low-risk patient, CT at 6-12 months, then consider CT at 18-24 months. In a high-risk patient, CT at 6-12 months, then CT at 18-24 months. Nodule size greater than 8 mm In a low-risk patient, consider CT at 3 months, PET/CT, or tissue sampling. In a high-risk patient, consider CT at 3 months, PET/CT, or tissue sampling. Multiple Solid Nodules: Nodule size less than 6 mm In a low-risk patient, no routine follow-up. In a high-risk patient, optional CT at 12 months. Nodule size equals 6-8 mm In a low-risk patient, CT at 3-6 months, then consider CT at 18-24 months. In a high-risk patient, CT at 3-6 months, then CT at 18-24 months. Nodule size greater than 8 mm In a low-risk patient, CT at 3-6 months, then consider CT at 18-24 months. In a high-risk patient, CT at 3-6 months, then CT at 18-24 months. - Low risk patients include individuals with minimal or absent history of smoking and other known risk factors. - High risk patients include individuals with a history or smoking or known risk factors. Radiology 2017 http://pubs. rsna.org/doi/full/10.1148/radiol. 0013856035     XR ABDOMEN FOR NG/OG/NE TUBE PLACEMENT    Result Date: 10/23/2021  EXAMINATION: ONE SUPINE XRAY VIEW(S) OF THE ABDOMEN 10/23/2021 5:45 pm COMPARISON: Today at 1559 hours HISTORY: ORDERING SYSTEM PROVIDED HISTORY: Confirmation of course of NG/OG/NE tube and location of tip of tube TECHNOLOGIST PROVIDED HISTORY: Reason for exam:->Confirmation of course of NG/OG/NE tube and location of tip of tube FINDINGS: Following the injection of Gastroview through a gastric PEG tube, there is opacification of the gastric mucosa. No evidence of contrast extravasation. Satisfactory position of gastric PEG tube.          ASSESSMENT:  67 y.o. female with gastroparesis following Whipple procedure for branch duct IPMN    PLAN:  Unclear if feeding tube is in proper position   Plan for EGD with replacement of J-tube tomorrow  If still unable to tolerate enteral nutritional support, will need central venous access and parenteral nutrition    Electronically signed by Kevin Glynn MD on 10/23/21 at 8:21 PM EDT

## 2021-10-24 NOTE — OP NOTE
Daphne Koroma  YOB: 1945  [id number]    Pre-operative Diagnosis: Nausea and vomiting, gastroparesis, suspected feeding tube malfunction    Post-operative Diagnosis: gastritis hiatal hernia ulcer    Procedure: EGD with biopsies    Anesthesia: LMAC    Surgeon: Curtis Hogan MD    Assistant: None    Estimated Blood Loss: none    Complications: none    Specimens: None    Procedure:  Pt was taken to the endoscopy suite and placed on the endoscopy table in a supine position. LMAC anesthesia was administered and a bite block was inserted. A lubricated gastroscope was inserted into the oropharynx and advanced into the esophagus. and stomach. The stomach was insufflated. The tip of the jejunostomy tube was in the gastric lumen. The anatomy was identified. The pylorus was intubated and the jejunal limb was inspected. No pathology was noted. A previous clip was seen. The scope was pulled back into the stomach. The gastrostomy was removed and the halyard jejunostomy tube was inserted under direct vision. A clip was inserted through the gastroscope and the suture at the tip of the jejunostomy tube was grasped with a clip. I then advanced the gastroscope along with the clip and jejunostomy tube through the pylorus and as far possible down the jejunal limb. I passed the previous clip and clipped the new jejunostomy tube to the sidewall. I retrieved the gastroscope making sure not to avulse the jejunostomy tube. The stomach was inspected. The balloon of the jejunostomy tube was inflated and the bumper was cinched down. The stomach was deflated and the scope was removed. The patient tolerated the procedure well.     Impression: Successful postpyloric jejunostomy tube placement    Plan: Start jejunostomy feeds, gastrostomy port to gravity      Curtis Hogan MD

## 2021-10-24 NOTE — PROGRESS NOTES
REPORT CALLED TO FLOOR RN VIANEY AT THIS TIME AND UPDATED ON PT STATUS , TRANSPORT REQUEST PLACED PT COMFORTABLE VSS, GASTROSTOMY PART OF PEG TUBE REMAINS TO GRAVITY PER DR ALONSO INSTRUCTIONS

## 2021-10-24 NOTE — H&P
[address] [facility]  History and Physical      CHIEF COMPLAINT:    Chief Complaint   Patient presents with    Other     sent by Aleksandra Mathis for admission, unsure if feeding tube placement is correct position        Patient of Leonor Toro MD presents with:  PEG tube malfunction [facility])    History of Present Illness:   Patient is a 67year old female with a PMH of depression, GERD, HTN and a recent whipple procedure 9/9 who presents the emergency room at the direction of Dr Aleksandra Mathis for [facility] malfunction. Patient states that she was just discharged from [facility] where the PEJ was placed home when she noticed that tube feeds where in her PEG drainage bag. Daughter spoke with Dr Aleksandra Mathis in regards to the findings and it was decided the patient needed to come to the emergency room to have imaging completed and possible surgery to fix her PEJ. Patient on last admission was seen by pulmonary in regards to high oxygen needs and a left pleural effusion which was tapped. Patient this time had a CTA Pulmonary and it was discovered that she has a moderate left pleural effusion and is requiring oxygent to maintain saturations. Patient denies CP, SOB, abdominal pain, fevers, chills. Patient admitted to intermediate telemetry for further workup and treatment. REVIEW OF SYSTEMS:  Pertinent negatives are above in HPI. 10 point ROS otherwise negative.       Past Medical History:   Diagnosis Date    Depression     GERD (gastroesophageal reflux disease)     Hypertension          Past Surgical History:   Procedure Laterality Date    GASTROSTOMY TUBE PLACEMENT N/A 10/6/2021    EGD PEG TUBE PLACEMENT performed by Jacey Tellez MD at [address] [place] [address]  09/09/2021    Pylorus-Preserving Pancreaticoduodenectomy at [facility] by Dr. Jalen Tom       Medications Prior to Admission:    Medications Prior to Admission: enoxaparin (LOVENOX) 80 MG/0.8ML injection, Inject 80 mg into the skin 2 times daily  metoclopramide (REGLAN) 10 MG tablet, Take 1 tablet by mouth daily  pantoprazole (PROTONIX) 40 MG tablet, Take 40 mg by mouth daily  blood glucose monitor kit and supplies, Dispense sufficient amount for indicated testing frequency plus additional to accommodate PRN testing needs. Dispense all needed supplies to include: monitor, strips, lancing device, lancets, control solutions, alcohol swabs. Note that the patient's home medications were reviewed and the above list is accurate to the best of my knowledge at the time of the exam.    Allergies:    Patient has no known allergies. Social History:    reports that she has never smoked. She has never used smokeless tobacco. She reports previous alcohol use. She reports that she does not use drugs. Family History:   family history is not on file. PHYSICAL EXAM:    Vitals:  BP (!) 118/58   Pulse 88   Temp 98 °F (36.7 °C) (Temporal)   Resp 18   Ht 5' 7\" (1.702 m)   Wt 172 lb (78 kg)   SpO2 96%   BMI 26.94 kg/m²       General appearance: NAD, conversant  Eyes: Sclerae anicteric, PERRLA  HEENT: AT/NC, MMM  Neck: FROM, supple, no thyromegaly  Lymph: No cervical / supraclavicular lymphadenopathy  Lungs: diminished  CV: RRR, no MRGs, no lower extremity edema  Abdomen: Soft, non-tender; no masses or HSM, +BS, PEG tube  Extremities: FROM without synovitis. No clubbing or cyanosis of the hands. Skin: no rash, induration, lesions, or ulcers  Psych: Calm and cooperative. Normal judgement and insight. Normal mood and affect. Neuro: Alert and interactive, face symmetric, speech fluent. LABS:  All labs reviewed.   Of note:  CBC with Differential:    Lab Results   Component Value Date    WBC 5.1 10/24/2021    RBC 3.65 10/24/2021    HGB 9.3 10/24/2021    HCT 30.8 10/24/2021     10/24/2021    MCV 84.4 10/24/2021    MCH 25.5 10/24/2021    MCHC 30.2 10/24/2021    RDW 14.8 10/24/2021    LYMPHOPCT 29.9 10/24/2021    MONOPCT 8.5 10/24/2021    BASOPCT 1.0 10/24/2021 MONOSABS 0.43 10/24/2021    LYMPHSABS 1.52 10/24/2021    EOSABS 0.08 10/24/2021    BASOSABS 0.05 10/24/2021     CMP:    Lab Results   Component Value Date     10/24/2021    K 3.6 10/24/2021    CL 94 10/24/2021    CO2 36 10/24/2021    BUN 21 10/24/2021    CREATININE 0.7 10/24/2021    GFRAA >60 10/24/2021    LABGLOM >60 10/24/2021    GLUCOSE 121 10/24/2021    PROT 5.8 10/23/2021    LABALBU 2.8 10/23/2021    CALCIUM 8.6 10/24/2021    BILITOT 0.3 10/23/2021    ALKPHOS 124 10/23/2021    AST 32 10/23/2021    ALT 18 10/23/2021       Imaging:  CT Pulmonary: Moderate left pleural effusion. XR Abdomen: satisfactory position for PEG tube    EKG:  Sinus rhythm with occasional PVCs     Telemetry:  SR    ASSESSMENT/PLAN:  Principal Problem:    PEG tube malfunction (HCC)  Active Problems:    Hypoxia  Resolved Problems:    * No resolved hospital problems. *    Patient is a 67year old female with a PMH of depression, GERD, HTN and a recent whipple procedure admitted to telemetry for     PEJ tube malfunction   -General surgery consulted  -Monitor labs  -OR today for correction of PEJ tube-Successful postpyloric jejunostomy tube placement 10/24/2021   -TFs   -PEG to gravity   -PRN Reglan   -Can d/c IVF when dietary makes recommendations on water flushes.  -Dietitian consulted - will use recs from [facility] for now  -monitor blood sugars     Consult pulmonology for possible thoracentesis of the left moderate pleural effusion as was done on previous admission, patient requiring 2 L of oxygen    DVT prophylaxis Lovenox  PT/OT  Discharge planning      Code status: Full  Requires Inpatient level of care  NAMRATA Schmidt - CNP    11:38 AM  10/24/2021     Above note edited to reflect my thoughts     I personally saw, examined and provided care for the patient. Radiographs, labs and medication list were reviewed by me independently. The case was discussed in detail and plans for care were established.  Review of Cody Chong APRN-CNP   , documentation was conducted and revisions were made as appropriate directly by me. I agree with the above documented exam, problem list, and plan of care.      Yany Minor MD  3:50 PM  10/24/2021

## 2021-10-25 ENCOUNTER — APPOINTMENT (OUTPATIENT)
Dept: GENERAL RADIOLOGY | Age: 76
DRG: 393 | End: 2021-10-25
Payer: MEDICARE

## 2021-10-25 ENCOUNTER — APPOINTMENT (OUTPATIENT)
Dept: ULTRASOUND IMAGING | Age: 76
DRG: 393 | End: 2021-10-25
Payer: MEDICARE

## 2021-10-25 PROBLEM — E43 SEVERE PROTEIN-CALORIE MALNUTRITION (HCC): Chronic | Status: ACTIVE | Noted: 2021-10-25

## 2021-10-25 LAB
ANION GAP SERPL CALCULATED.3IONS-SCNC: 5 MMOL/L (ref 7–16)
APPEARANCE FLUID: NORMAL
BASOPHILS ABSOLUTE: 0.03 E9/L (ref 0–0.2)
BASOPHILS RELATIVE PERCENT: 0.7 % (ref 0–2)
BUN BLDV-MCNC: 21 MG/DL (ref 6–23)
CALCIUM SERPL-MCNC: 8.2 MG/DL (ref 8.6–10.2)
CELL COUNT FLUID TYPE: NORMAL
CHLORIDE BLD-SCNC: 97 MMOL/L (ref 98–107)
CO2: 37 MMOL/L (ref 22–29)
COLOR FLUID: YELLOW
CREAT SERPL-MCNC: 0.6 MG/DL (ref 0.5–1)
CRITICAL: ABNORMAL
DATE ANALYZED: ABNORMAL
DATE OF COLLECTION: ABNORMAL
EOSINOPHILS ABSOLUTE: 0.09 E9/L (ref 0.05–0.5)
EOSINOPHILS RELATIVE PERCENT: 2.1 % (ref 0–6)
FLUID TYPE: NORMAL
GFR AFRICAN AMERICAN: >60
GFR NON-AFRICAN AMERICAN: >60 ML/MIN/1.73
GLUCOSE BLD-MCNC: 120 MG/DL (ref 74–99)
GLUCOSE, FLUID: 130 MG/DL
HCT VFR BLD CALC: 29.4 % (ref 34–48)
HEMOGLOBIN: 9 G/DL (ref 11.5–15.5)
IMMATURE GRANULOCYTES #: 0.02 E9/L
IMMATURE GRANULOCYTES %: 0.5 % (ref 0–5)
INR BLD: 1.2
LAB: ABNORMAL
LD, FLUID: 100 U/L
LV EF: 60 %
LVEF MODALITY: NORMAL
LYMPHOCYTES ABSOLUTE: 1.38 E9/L (ref 1.5–4)
LYMPHOCYTES RELATIVE PERCENT: 32 % (ref 20–42)
Lab: ABNORMAL
Lab: ABNORMAL
MAGNESIUM: 2.2 MG/DL (ref 1.6–2.6)
MCH RBC QN AUTO: 26.2 PG (ref 26–35)
MCHC RBC AUTO-ENTMCNC: 30.6 % (ref 32–34.5)
MCV RBC AUTO: 85.7 FL (ref 80–99.9)
METER GLUCOSE: 141 MG/DL (ref 74–99)
METER GLUCOSE: 180 MG/DL (ref 74–99)
MONOCYTE, FLUID: 85 %
MONOCYTES ABSOLUTE: 0.34 E9/L (ref 0.1–0.95)
MONOCYTES RELATIVE PERCENT: 7.9 % (ref 2–12)
NEUTROPHIL, FLUID: 15 %
NEUTROPHILS ABSOLUTE: 2.45 E9/L (ref 1.8–7.3)
NEUTROPHILS RELATIVE PERCENT: 56.8 % (ref 43–80)
NUCLEATED CELLS FLUID: 1444 /UL
OPERATOR ID: ABNORMAL
PDW BLD-RTO: 14.8 FL (ref 11.5–15)
PH FLUID: ABNORMAL
PHOSPHORUS: 3.5 MG/DL (ref 2.5–4.5)
PLATELET # BLD: 366 E9/L (ref 130–450)
PMV BLD AUTO: 10.2 FL (ref 7–12)
POTASSIUM SERPL-SCNC: 3.6 MMOL/L (ref 3.5–5)
PROTEIN FLUID: 3.1 G/DL
PROTHROMBIN TIME: 12.8 SEC (ref 9.3–12.4)
RBC # BLD: 3.43 E12/L (ref 3.5–5.5)
RBC FLUID: NORMAL /UL
SODIUM BLD-SCNC: 139 MMOL/L (ref 132–146)
SOURCE, BLOOD GAS: ABNORMAL
TIME ANALYZED: 913
WBC # BLD: 4.3 E9/L (ref 4.5–11.5)

## 2021-10-25 PROCEDURE — 85025 COMPLETE CBC W/AUTO DIFF WBC: CPT

## 2021-10-25 PROCEDURE — 2580000003 HC RX 258: Performed by: SURGERY

## 2021-10-25 PROCEDURE — 82962 GLUCOSE BLOOD TEST: CPT

## 2021-10-25 PROCEDURE — 97165 OT EVAL LOW COMPLEX 30 MIN: CPT

## 2021-10-25 PROCEDURE — 93306 TTE W/DOPPLER COMPLETE: CPT

## 2021-10-25 PROCEDURE — 88305 TISSUE EXAM BY PATHOLOGIST: CPT

## 2021-10-25 PROCEDURE — 71045 X-RAY EXAM CHEST 1 VIEW: CPT

## 2021-10-25 PROCEDURE — 87070 CULTURE OTHR SPECIMN AEROBIC: CPT

## 2021-10-25 PROCEDURE — 97161 PT EVAL LOW COMPLEX 20 MIN: CPT

## 2021-10-25 PROCEDURE — 83735 ASSAY OF MAGNESIUM: CPT

## 2021-10-25 PROCEDURE — 83986 ASSAY PH BODY FLUID NOS: CPT

## 2021-10-25 PROCEDURE — 80048 BASIC METABOLIC PNL TOTAL CA: CPT

## 2021-10-25 PROCEDURE — 32555 ASPIRATE PLEURA W/ IMAGING: CPT

## 2021-10-25 PROCEDURE — 2060000000 HC ICU INTERMEDIATE R&B

## 2021-10-25 PROCEDURE — 87205 SMEAR GRAM STAIN: CPT

## 2021-10-25 PROCEDURE — 0W9B3ZZ DRAINAGE OF LEFT PLEURAL CAVITY, PERCUTANEOUS APPROACH: ICD-10-PCS | Performed by: RADIOLOGY

## 2021-10-25 PROCEDURE — 2700000000 HC OXYGEN THERAPY PER DAY

## 2021-10-25 PROCEDURE — 84100 ASSAY OF PHOSPHORUS: CPT

## 2021-10-25 PROCEDURE — C9113 INJ PANTOPRAZOLE SODIUM, VIA: HCPCS | Performed by: SURGERY

## 2021-10-25 PROCEDURE — 36415 COLL VENOUS BLD VENIPUNCTURE: CPT

## 2021-10-25 PROCEDURE — 6360000002 HC RX W HCPCS: Performed by: INTERNAL MEDICINE

## 2021-10-25 PROCEDURE — 6360000002 HC RX W HCPCS: Performed by: SURGERY

## 2021-10-25 PROCEDURE — 82947 ASSAY GLUCOSE BLOOD QUANT: CPT

## 2021-10-25 PROCEDURE — 85610 PROTHROMBIN TIME: CPT

## 2021-10-25 PROCEDURE — 83615 LACTATE (LD) (LDH) ENZYME: CPT

## 2021-10-25 PROCEDURE — 88112 CYTOPATH CELL ENHANCE TECH: CPT

## 2021-10-25 PROCEDURE — 84157 ASSAY OF PROTEIN OTHER: CPT

## 2021-10-25 RX ADMIN — SODIUM CHLORIDE, PRESERVATIVE FREE 10 ML: 5 INJECTION INTRAVENOUS at 10:06

## 2021-10-25 RX ADMIN — METOCLOPRAMIDE 10 MG: 5 INJECTION, SOLUTION INTRAMUSCULAR; INTRAVENOUS at 12:46

## 2021-10-25 RX ADMIN — METOCLOPRAMIDE 10 MG: 5 INJECTION, SOLUTION INTRAMUSCULAR; INTRAVENOUS at 00:28

## 2021-10-25 RX ADMIN — PANTOPRAZOLE SODIUM 40 MG: 40 INJECTION, POWDER, FOR SOLUTION INTRAVENOUS at 10:06

## 2021-10-25 RX ADMIN — ENOXAPARIN SODIUM 80 MG: 80 INJECTION SUBCUTANEOUS at 22:37

## 2021-10-25 ASSESSMENT — PAIN SCALES - GENERAL
PAINLEVEL_OUTOF10: 0

## 2021-10-25 NOTE — PROGRESS NOTES
Patient SPO2 90-92 % on 2 L nasal cannula. Dr. Nona Fleischer made aware, STAT chest x ray read and ok to transport back to room per Dr. Nona Fleischer. Patient denies any shortness of breath or complaints of pain.

## 2021-10-25 NOTE — CONSULTS
Malick Granados M.D.,[facility]  Margarita Flower D.O., F.A.C.O.I., Gunnar Ware M.D. Shara Urena M.D. Sabrina Narayanan D.O. Patient:  Taco Starks 67 y.o. female MRN: [id number]     Date of Service: 10/25/2021      PULMONARY CONSULTATION    Reason for Consultation: moderate pleural effusion, thoracentesis  Referring Physician: Dr. Deja Wilson with the referring physician will be sent via the electronic medical record. Chief Complaint: peg tube malfunction    CODE STATUS: full code    SUBJECTIVE:  HPI:  Taco Starks is a 67 y.o. female known to our practice from her recent stay at the beginning of October, where we saw her for hypoxia secondary to pleural effusion. She had a thoracentesis at that time with 400 cc of fluid removed. Since then she was recently seen at [facility] for a Whipple surgery that she had performed on 9/9/2021. She returned to [facility] when the tube in the small intestine became malpositioned. She presented to the ED here on 10/23 with similar complications of nausea and vomiting, and gastroparesis was suspected malfunction once again. She underwent surgery with Dr. Jc Marsh who found a hiatal hernia ulcer and gastritis. We were reconsulted for recurrence of pleural effusion which was noted on CTA chest on 10/23/2021. She underwent thoracentesis of the left side this morning per interventional radiology. 360 cc of serosanguineous fluid was drained and sent to lab for culture and analysis. Today, she is seen and examined lying in bed in no apparent distress. She is currently on 2 L of oxygen and denies cough or shortness of breath. Chest x-ray following procedure shows reduced left pleural volume and no pneumothorax.     Past Medical History:   Diagnosis Date    Depression     GERD (gastroesophageal reflux disease)     Hypertension        Past Surgical History:   Procedure Laterality Date    GASTROSTOMY TUBE PLACEMENT N/A 10/6/2021    EGD PEG TUBE PLACEMENT performed by Tamara Anderson MD at [address] [facility] N/A 10/24/2021    REMOVAL OF GASTROSTOMY TUBE, EGD  WITH JEJUNOSTOMY TUBE PLACEMENT performed by Mike Blanco MD at [address] [facility]  09/09/2021    Pylorus-Preserving Pancreaticoduodenectomy at [facility] by Dr. Bob Dey       History reviewed. No pertinent family history. Social History:   Social History     Socioeconomic History    Marital status:      Spouse name: Not on file    Number of children: Not on file    Years of education: Not on file    Highest education level: Not on file   Occupational History    Not on file   Tobacco Use    Smoking status: Never Smoker    Smokeless tobacco: Never Used   Vaping Use    Vaping Use: Never used   Substance and Sexual Activity    Alcohol use: Not Currently    Drug use: Never    Sexual activity: Not on file   Other Topics Concern    Not on file   Social History Narrative    Not on file     Social Determinants of Health     Financial Resource Strain:     Difficulty of Paying Living Expenses:    Food Insecurity:     Worried About [address] in the Last Year:     [address] in the Last Year:    Transportation Needs:     Lack of Transportation (Medical):     Lack of Transportation (Non-Medical):    Physical Activity:     Days of Exercise per Week:     Minutes of Exercise per Session:    Stress:     Feeling of Stress :    Social Connections:     Frequency of Communication with Friends and Family:     Frequency of Social Gatherings with Friends and Family:     Attends Tenri[employer] Services:      Active Member of Clubs or Organizations:     Attends Club or Organization Meetings:     Marital Status:    Intimate Partner Violence:     Fear of Current or Ex-Partner:     Emotionally Abused:     Physically Abused:     Sexually Abused:      Smoking history: The patient is a former smoker of half a pack a day x20 years    ETOH:   reports previous alcohol use.    Exposures: She has no adverse exposure history  Vaccines:      Immunization History   Administered Date(s) Administered    COVID-19, NADER Springer, 30mcg/0.3mL 02/01/2021, 02/26/2021        Home Meds: Medications Prior to Admission: enoxaparin (LOVENOX) 80 MG/0.8ML injection, Inject 80 mg into the skin 2 times daily  metoclopramide (REGLAN) 10 MG tablet, Take 1 tablet by mouth daily  pantoprazole (PROTONIX) 40 MG tablet, Take 40 mg by mouth daily  blood glucose monitor kit and supplies, Dispense sufficient amount for indicated testing frequency plus additional to accommodate PRN testing needs. Dispense all needed supplies to include: monitor, strips, lancing device, lancets, control solutions, alcohol swabs. CURRENT MEDS :  Scheduled Meds:   [Held by provider] enoxaparin  80 mg SubCUTAneous BID    pantoprazole  40 mg IntraVENous Daily    sodium chloride flush  10 mL IntraVENous 2 times per day       Continuous Infusions:   sodium chloride         No Known Allergies    REVIEW OF SYSTEMS:  Constitutional: Denies fever, weight loss, night sweats, and fatigue  Skin: Denies pigmentation, dark lesions, and rashes   HEENT: Denies hearing loss, tinnitus, ear drainage, epistaxis, sore throat, and hoarseness. Cardiovascular: Denies palpitations, chest pain, and chest pressure. Respiratory: Denies cough, dyspnea at rest, hemoptysis, apnea, and choking.   Gastrointestinal: Denies nausea, vomiting, poor appetite, diarrhea, heartburn or reflux  Genitourinary: Denies dysuria, frequency, urgency or hematuria  Musculoskeletal: Denies myalgias, muscle weakness, and bone pain  Neurological: Denies dizziness, vertigo, headache, and focal weakness  Psychological: Denies anxiety and depression  Endocrine: Denies heat intolerance and cold intolerance  Hematopoietic/Lymphatic: Denies bleeding problems and blood transfusions    OBJECTIVE:   /62   Pulse 85   Temp 97.7 °F (36.5 °C) (Oral)   Resp 16   Ht 5' 7\" (1.702 m) 10/25/2021    K 3.6 10/24/2021    CL 97 10/25/2021    CO2 37 10/25/2021    BUN 21 10/25/2021    CREATININE 0.6 10/25/2021    LABALBU 2.8 10/23/2021    CALCIUM 8.2 10/25/2021    GFRAA >60 10/25/2021    LABGLOM >60 10/25/2021     Lab Results   Component Value Date    PROTIME 12.8 10/25/2021    INR 1.2 10/25/2021     No results for input(s): PROBNP in the last 72 hours. No results for input(s): TROPONINI in the last 72 hours. No results for input(s): PROCAL in the last 72 hours. This SmartLink has not been configured with any valid records. Micro:  No results for input(s): CULTRESP in the last 72 hours. No results for input(s): LABGRAM in the last 72 hours. No results for input(s): LEGUR in the last 72 hours. No results for input(s): STREPNEUMAGU in the last 72 hours. No results for input(s): LP1UAG in the last 72 hours. Assessment:  Acute hypoxic respiratory failure  Left pleural effusion with compressive atelectasis  Small area of scar in right lung apex. 9/9/2021 Whipple procedure at [facility] per Dr. Zaidi Los  Pancreatic cyst pathology negative for malignant cells  NG malfunction  GERD  Hypertension  History of nicotine dependence in remission    Plan:  Oxygen 2 L via nasal cannula, wean to keep sats greater than 92%  Thoracentesis this morning per interventional radiology, fluid sent for culture  Lung recruitment maneuvers-incentive spirometer  Monitor chest x-ray  GI/DVT prophylaxis      Thank you for allowing me to participate in the care of Alejandro Feliz. Please feel free to call with questions. This plan of care was reviewed in collaboration with Dr. Oma Frey    Electronically signed by NAMRATA Jain CNP on 10/25/2021 at 10:59 AM      Note: This report was completed utilizing computer voice recognition software.  Every effort has been made to ensure accuracy, however; inadvertent computerized transcription errors may be present      I personally saw, examined, and cared for the

## 2021-10-25 NOTE — PROGRESS NOTES
Environmental modifications for safe mobility and completion of ADLs                             Therapeutic activity to improve functional performance during ADLs. Therapeutic exercise to improve tolerance and functional strength for ADLs    Balance retraining/tolerance tasks for facilitation of postural control with dynamic challenges during ADLs . Positioning to improve functional independence  []      Recommended Adaptive Equipment: TBD     SOCIAL: patient has been in/out hospital since surgery. Returned home from [facility] for 1 day - admitted to [facility]     Daughter will be staying with patient. Prior to surgery:  Home Living: Pt lives with alone, 1 story with 3 steps/rail.   Basement    Equipment owned: [facility] [employer], [employer], electric recliner, and shower chair     Prior Level of Function: (prior to surgery 9/9/2021)  Independent with ADLs , Independent  with IADLs; ambulated with no device      Pain Level: no pain this session ;   Cognition: A&O: 4/4;    Memory:  Good    Sequencing:  Good    Problem solving:  Good    Judgement/safety:  Good      Functional Assessment:  AM-PAC Daily Activity Raw Score: 16/24   Initial Eval Status  Date: 10/25/21 Treatment Status  Date: STGs = LTGs  Time frame: 10-14 days   Feeding PEG   Independent    Grooming Denise,seated   Decrease standing tolerance   Supervision    UB Dressing Min A  Supervision    LB Dressing Mod A   Overall decrease endurance   SBA    Bathing Mod A   SBA    Toileting SBA   Independent    Bed Mobility  Min A  Supine to sit   Mod i   Functional Transfers Min A  Sit-stand from bed   Mod I    Functional Mobility CGA,w/walker, O2  Short distance in hallway   O2 sats 90s     Supervision  with good tolerance    Balance Sitting:     Static:  SBA- EOB     Dynamic:Mod    Standing: Min/CGA   Independent/supervision    Activity Tolerance Fair - with light activity   Good  with ADL activity    Visual/  Perceptual Glasses: yes Hand Dominance right    AROM (PROM) Strength Additional Info:    RUE  WFL WFL good  and wfl FMC/dexterity noted during ADL tasks       LUE WFL WFL good  and wfl FMC/dexterity noted during ADL tasks       Hearing: WFL  Sensation:  No c/o numbness or tingling   Tone: WFL   Edema: none observed     Comments: Upon arrival patient lying in bed . At end of session, patient sitting in chair  with call light and phone within reach, all lines and tubes intact. Family present in room      Overall patient demonstrated  decreased independence and safety during completion of ADL/functional transfer/mobility tasks. Pt would benefit from continued skilled OT to increase safety and independence with completion of ADL/IADL tasks for functional independence and quality of life. Rehab Potential: good for established goals     Patient / Family Goal: return home       Patient and/or family were instructed on functional diagnosis, prognosis/goals and OT plan of care. Demonstrated good understanding. Eval Complexity: Low    Time In: 1400  Time Out: 1415      Min Units   OT Eval Low 97165 x  1   OT Eval Medium 28285      OT Eval High 84356      OT Re-Eval [id number]       Therapeutic Ex 48492       Therapeutic Activities 96515       ADL/Self Care 52504       Orthotic Management 84712       Manual 89951     Neuro Re-Ed [id number]       Non-Billable Time          Evaluation Time additionally includes thorough review of current medical information, gathering information on past medical history/social history and prior level of function, interpretation of standardized testing/informal observation of tasks, assessment of data and development of plan of care and goals.             Yamilet Caruso  OTR/L  OT [id number]

## 2021-10-25 NOTE — PROGRESS NOTES
IRFLOWSHEET    Date: 10/25/2021    Time: 8:42 AM     Exam: Ultrasound guided left thoracentesis    Radiologist Performing Procedure: Dr. Lopes Spine    Nurse Reporting/Phone: [phone]    Nurse Receiving: Nitin Hernández RN    Permit signed:      Yes    ID Band Checklist: Yes    Allergies: Patient has no known allergies. TIME OUT: 9992    PROCEDURE START TIME: 7108    Puncture Site: Left chest    Puncture Time: 0850    Catheters: 5 Icelandic x 7 one step    LABS:   Lab Results   Component Value Date    INR 1.2 10/25/2021    PROTIME 12.8 (H) 10/25/2021           Lab Results   Component Value Date    CREATININE 0.6 10/25/2021    BUN 21 10/25/2021          Lab Results   Component Value Date    HGB 9.0 (L) 10/25/2021    HCT 29.4 (L) 10/25/2021     10/25/2021         PROCEDURE END TIME: 0855    Total Contrast: N/A    Fluoroscopy Time: N/A    Complications:  None    Comments: Pt transported to department for left  thoracentesis. with Dr. Moses Dubois. Pt is alert and oriented, pt denies any shortness of breath or chest pain prior to procedure. History and medications reviewed with patient/family. Procedure is explained to patient, including possible risks by Dr. Moses Dubois. Pt verbalizes understanding and consent form signed. Pt positioned sitting on side of bed for procedure, ultrasound images taken prior to procedure and reviewed with physician. Procedure done under sterile technique and guidance of ultrasound. A total of 360 ml's of serosanguinous colored fluid drained during procedure. Sample obtained and sent to lab for ordered testing. Catheter removed and op-site dressing applied to site with no bleeding. Pt denies any chest pain or shortness of breath after procedure. A stat chest x ray done and read by Dr. Lopes Spine. Pt monitored with no complications. Pt given discharge instructions and verbalizes understanding of post procedure care/follow up. Pt was transported out of department with no difficulties.

## 2021-10-25 NOTE — PROGRESS NOTES
Physical Therapy    Facility/Department: [facility]  Initial Assessment    NAME: Steffany Carroll  : 1945  MRN: [id number]    Date of Service: 10/25/2021       REQUIRES PT FOLLOW UP: Yes       Patient Diagnosis(es): The encounter diagnosis was PEG tube malfunction ([address] has a past medical history of Depression, GERD (gastroesophageal reflux disease), and Hypertension. has a past surgical history that includes Pancreas surgery (2021); Gastrostomy tube placement (N/A, 10/6/2021); and Gastrostomy tube placement (N/A, 10/24/2021). Evaluating Therapist: Zena Wilcox, PT     Referring Provider:  GUERRERO Diaz    PT order : Pt eval and treat     Room #: 674   DIAGNOSIS:  PEG tube malfunction s/p REMOVAL OF GASTROSTOMY TUBE, EGD  WITH JEJUNOSTOMY TUBE PLACEMENT. Thoracentesis 10/25  PRECAUTIONS:  Falls, PEG, O2     Social:  Pt lives alone  in a  1  floor plan  3  steps and  1  rails to enter. Daughter will be staying with pt at discharge   Prior to admission pt walked with  No AD, independent      Initial Evaluation  Date: 10/25/2021 Treatment      Short Term/ Long Term   Goals   Was pt agreeable to Eval/treatment? yes      Does pt have pain? None  reported      Bed Mobility  Rolling: S/I   Supine to sit:  Min assist   Sit to supine:  NT  Scooting: min assist   S/I    Transfers Sit to stand:  CGA   Stand to sit:  CGA   Stand pivot:  NT   S/I    Ambulation     100  feet with  ww  with  CGA    200  feet with ww/AAD  with  S/I        Stair negotiation: ascended and descended NT   4  steps with  1  rail with SBA/CGA    LE ROM  WFL     LE strength  4- to 4/ 5   Increase by 1-3/ MMT grade    AM- PAC RAW score              Pt is alert and Oriented x  3     Balance:  CGA, mildly unsteady gait, at risk for falls     Endurance: decreased  Bed/Chair alarm: no - family present      ASSESSMENT  Pt displays functional ability as noted in the objective portion of this evaluation.         Conditions Requiring Skilled Therapeutic Intervention:    [x]Decreased strength     []Decreased ROM  [x]Decreased functional mobility  [x]Decreased balance   [x]Decreased endurance   []Decreased posture  []Decreased sensation  []Decreased coordination   []Decreased vision  []Decreased safety awareness   []Increased pain       Treatment/Education:    Pt in bed upon arrival ; daughters present; agreeable to PT. Cues for technique with mobility. Pt mildly unsteady with gait , but no LOB. Pulse ox after mobility 95%, recovered to 97%. Pt issued incentive spirometer, instructed in  Use, pt performed and will require continued education in order to use properly     Pt educated on fall risk,  Safety with mobility        Patient response to education:   Pt verbalized understanding Pt demonstrated skill Pt requires further education in this area    x  with cues   x       Comments:  Pt left  In chair with LEs elevated after session, with call light in reach. Rehab potential is Good for reaching above PT goals. Pts/ family goals   1. Increase strength     Patient and or family understand(s) diagnosis, prognosis, and plan of care. -  Yes     PLAN  PT care will be provided in accordance with the objectives noted above. Whenever appropriate, clear delegation orders will be provided for nursing staff. Exercises and functional mobility practice will be used as well as appropriate assistive devices or modalities to obtain goals. Patient and family education will also be administered as needed.         PLAN OF CARE:    Current Treatment Recommendations     [x] Strengthening to improve independence with functional mobility   [] ROM to improve independence with functional mobility   [x] Balance Training to improve static/dynamic balance and to reduce fall risk  [x] Endurance Training to improve activity tolerance during functional mobility   [x] Transfer Training to improve safety and independence with all functional transfers   [x]

## 2021-10-25 NOTE — PROGRESS NOTES
Subjective: The patient is awake and alert. Resting in bed  No acute events overnight. Denies chest pain, angina, SOB     Objective:    /62   Pulse 85   Temp 97.7 °F (36.5 °C) (Oral)   Resp 16   Ht 5' 7\" (1.702 m)   Wt 172 lb (78 kg)   SpO2 92% Comment: Dr. Cinthia Smith aware  BMI 26.94 kg/m²     In: 246 [NG/GT:246]  Out: 1150   In: 246   Out: 1150     General appearance: NAD, conversant, pleasant  HEENT: AT/NC, MMM  Neck: FROM, supple  Lungs: Diminished throughout   CV: RRR, no MRGs  Vasc: Radial pulses 2+  Abdomen: Soft, non-tender; no masses or HSM, peg tube   Extremities: No peripheral edema or digital cyanosis  Skin: no rash, lesions or ulcers  Psych: Alert and oriented to person, place and time  Neuro: Alert and interactive     Recent Labs     10/23/21  1741 10/24/21  0430 10/25/21  0340   WBC 5.3 5.1 4.3*   HGB 10.3* 9.3* 9.0*   HCT 33.8* 30.8* 29.4*    381 366       Recent Labs     10/23/21  1741 10/24/21  0430 10/25/21  0340    139 139   K 3.8 3.6 3.6   CL 92* 94* 97*   CO2 31* 36* 37*   BUN 20 21 21   CREATININE 0.7 0.7 0.6   CALCIUM 9.2 8.6 8.2*       Assessment:    Principal Problem:    PEG tube malfunction (HCC)  Active Problems:    Hypoxia  Resolved Problems:    * No resolved hospital problems.  *      Plan:  Patient is a 67year old female with a PMH of depression, GERD, HTN and a recent whipple procedure at [facility] admitted to telemetry for      PEJ tube malfunction   -General surgery consulted  -Monitor labs  -Successful postpyloric jejunostomy tube placement 10/24/2021 POD#1 patient tolerating tube feeds and PO   -G tube was clamped however patient unable to tolerate and asked for it to be unclamped immediately, DC okayed by general surgery, will reassess tomorrow and then hopefully discharge  -PRN Reglan   -Can d/c IVF when dietary makes recommendations on water flushes.  -Dietitian consulted - will use recs from [facility] for now  -monitor blood sugars     Pleural Effusion  -Thoracentesis today - 360 removed of serosanguinous fluid-likely transudative  -Repeat CXR:   -Pulse ox testing for possible home O2  -Encourage ISP   -No need for Pleurx catheter  -Echocardiogram unremarkable with ejection fraction 60%  Appreciate pulmonology input    DVT Prophylaxis   PT/OT  Discharge planning     Beronica Salgado, APRN - CNP  2:03 PM     Above note edited to reflect my thoughts     I personally saw, examined and provided care for the patient. Radiographs, labs and medication list were reviewed by me independently. The case was discussed in detail and plans for care were established. Review of [address] [place] [address], documentation was conducted and revisions were made as appropriate directly by me. I agree with the above documented exam, problem list, and plan of care.      Kajal Armstrong MD  6:25 PM  10/25/2021    10/25/2021

## 2021-10-25 NOTE — PROGRESS NOTES
Comprehensive Nutrition Assessment    Type and Reason for Visit:  Initial, Positive Nutrition Screen, Consult (TF O&M)    Nutrition Recommendations/Plan: Continue Diet as tolerated. Will Modify Current EN to appropriately meet estimated needs. TF Recommendations:  Standard w/o Fiber (Osmolite 1.2) @ 55 ml/hr (Goal) Continuous x 24 hrs/d plus 1 Protein Modular Daily to provide 1320 ml TV, 1584 kcals, 73 gm Pro, 1082 ml free water. (1688 kcals, 99 gm Pro w/ Pro Mod). 75 ml flush q 4 hrs to provide 1532 ml total water (TF+Flush). Of note, pt's Dtr asked if 2 Protein Modulars would be getting sent however w/ updated/current weight, pt only needing 1 Protein Modular Daily to meet estimated needs at this time. Nutrition Assessment:  Pt adm w/ nausea and TF drainage from PEG s/p feeds x ~1d pta. PMHx GERD, Gastroparesis, IPMN, s/p Whipple at [facility] (9/9/21), PEG (10/6/21); adm w/ Malpositioned PEG, s/p EGD w/ G/J Tube Replacement and Bx revealing gastritis, HH ulcer 10/24;  Pleural Effusion s/p Thoracentesis (-360 ml) on 10/25. Pt at risk d/t Severe Malnutrition AEB mild wasting, poor intake and ~16% wt loss x ~1 month d/t poor appetite w/ altered GI 2/2 Gastroparesis w/ IPMN. Will modify current TF to appropriately meet current estimated needs and monitor.     Malnutrition Assessment:  Malnutrition Status:  Severe malnutrition    Context:  Chronic Illness     Findings of the 6 clinical characteristics of malnutrition:  Energy Intake:  7 - 75% or less estimated energy requirements for 1 month or longer  Weight Loss:  7 - Greater than 5% over 1 month (~16% wt loss x ~1 month)     Body Fat Loss:  1 - Mild body fat loss Orbital, Triceps, Buccal region   Muscle Mass Loss:  1 - Mild muscle mass loss Temples (temporalis), Clavicles (pectoralis & deltoids), Thigh (quadraceps), Calf (gastrocnemius), Hand (interosseous), Scapula (trapezius)  Fluid Accumulation:  No significant fluid accumulation     Strength: Not Performed    Estimated Daily Nutrient Needs:  Energy (kcal):  MSJx1. 3SF= 8331-8806; Weight Used for Energy Requirements:  Current     Protein (g):  1.5-1.8 gm/kg per IBW= ; Weight Used for Protein Requirements:  Ideal        Fluid (ml/day):  9280-4539; Method Used for Fluid Requirements:  1 ml/kcal      Nutrition Related Findings:  A&O, edentulous, Abd/BS WDL, +G/J Tube, no edema, -I/O's      Wounds:  Surgical Incision       Current Nutrition Therapies:    ADULT TUBE FEEDING; Jejunostomy; Standard with Fiber; Continuous; 20; Yes; 10; Q 4 hours; 55; 30; Q 4 hours; Protein; Twice a day per J-tube  ADULT DIET; Clear Liquid  Current Tube Feeding (TF) Orders:  · Feeding Route: PEG/J  · Formula: Standard with Fiber  · Schedule: Continuous  · Additives/Modulars: Protein (BID)  · Water Flushes: 40 ml q 4 hrs= 180 ml total flush/d  · Current TF & Flush Orders Provides: Same as Goal  · Goal TF & Flush Orders Provides: @ 55 ml/hr (Goal)= 1320 ml TV, 1980 kcals, 84 gm Pro, 1003 ml free water, 1183 ml total water (TF+Flush). (2188 kcals, 136 gm Pro w/ Pro Mod BID)      Anthropometric Measures:  · Height: 5' 7\" (170.2 cm)  · Current Body Weight: 164 lb (74.4 kg) (bed 10/25)   · Admission Body Weight: 164 lb (74.4 kg) (bed 10/25)    · Usual Body Weight: 195 lb (88.5 kg) (bed 9/29/21 per EMR; ~16% loss x ~1 month pta)     · Ideal Body Weight: 135 lbs; % Ideal Body Weight     · BMI: 25.7  · Adjusted Body Weight:  ; No Adjustment   · Adjusted BMI:      · BMI Categories: Overweight (BMI 25.0-29. 9)       Nutrition Diagnosis:   · Severe malnutrition, In context of chronic illness related to altered GI function (2/2 IPMN w/ Gastroparesis) as evidenced by intake 0-25%, poor intake prior to admission, weight loss, weight loss greater than or equal to 5% in 1 month, mild loss of subcutaneous fat, mild muscle loss      Nutrition Interventions:   Food and/or Nutrient Delivery:  Continue Current Diet, Modify Tube Feeding (Continue Diet as tolerated. Will Modify Current EN to appropriately meet estimated needs. TF Rec:  Standard w/o Fiber (Osmolite 1.2) @ 55 ml/hr (Goal) Continuous x 24 hrs/d plus 1 Protein Modular Daily to provide 1320 ml TV, 1584 kcals, 73 gm Pro.)  Nutrition Education/Counseling:  Education initiated (d/w pt's dtr at bedside regarding TF)   Coordination of Nutrition Care:  Continue to monitor while inpatient    Goals:  Pt to tolerate EN at goal; PO intake >25% of meals. Nutrition Monitoring and Evaluation:   Behavioral-Environmental Outcomes:  None Identified   Food/Nutrient Intake Outcomes:  Diet Advancement/Tolerance, Food and Nutrient Intake, Enteral Nutrition Intake/Tolerance  Physical Signs/Symptoms Outcomes:  Biochemical Data, Chewing or Swallowing, GI Status, Nausea or Vomiting, Fluid Status or Edema, Hemodynamic Status, Nutrition Focused Physical Findings, Skin, Weight     Discharge Planning:     Too soon to determine     Electronically signed by Tamara Navarro RD, LD on 10/25/21 at 2:48 PM EDT    Contact: ext [phone]

## 2021-10-25 NOTE — PROGRESS NOTES
GENERAL SURGERY  DAILY PROGRESS NOTE  10/25/2021    CHIEF COMPLAINT:  Chief Complaint   Patient presents with    Other     sent by [facility] for admission, unsure if feeding tube placement is correct position       SUBJECTIVE:  Patient had EGD with J tube placement yesterday. No acute events overnight, was tolerating tube feeds yesterday after the procedure. Patient going for IR thoracentesis today. No pain, nausea, or SOB. OBJECTIVE:  /62   Pulse 85   Temp 97.7 °F (36.5 °C) (Oral)   Resp 16   Ht 5' 7\" (1.702 m)   Wt 172 lb (78 kg)   SpO2 98%   BMI 26.94 kg/m²     GENERAL:  NAD. A&Ox3. LUNGS:  No increased work of breathing. CARDIOVASCULAR: RR  ABDOMEN:  Soft, non-distended, non-tender, G port remains to gravity, J tube in place. No guarding, rigidity, rebound.   EXT: warm and well perfused    ASSESSMENT/PLAN:  67 y.o. female with history of whipple 9/2021 ([facility]) who presented with malpositioned GJ, now s/p EGD with J tube placement 10/24.    - NPO until IR thoracentesis today  - once she returns, okay to resume tube feeds  - there is no further surgical intervention warranted, please do not hesitate to imani if there are any further questions     Alonzo Guerrier MD  Surgery Resident PGY-2  10/25/2021  9:15 AM    Seen/examined  Tolerating feeds  Clamp g-tube and vent as needed  clears  Discharge ok from my standpoint  JSGadyMD

## 2021-10-26 LAB
GRAM STAIN ORDERABLE: NORMAL
METER GLUCOSE: 184 MG/DL (ref 74–99)
METER GLUCOSE: 185 MG/DL (ref 74–99)
METER GLUCOSE: 189 MG/DL (ref 74–99)
METER GLUCOSE: 196 MG/DL (ref 74–99)

## 2021-10-26 PROCEDURE — C9113 INJ PANTOPRAZOLE SODIUM, VIA: HCPCS | Performed by: SURGERY

## 2021-10-26 PROCEDURE — 6360000002 HC RX W HCPCS: Performed by: SURGERY

## 2021-10-26 PROCEDURE — 2700000000 HC OXYGEN THERAPY PER DAY

## 2021-10-26 PROCEDURE — 6360000002 HC RX W HCPCS: Performed by: INTERNAL MEDICINE

## 2021-10-26 PROCEDURE — 82962 GLUCOSE BLOOD TEST: CPT

## 2021-10-26 PROCEDURE — 6370000000 HC RX 637 (ALT 250 FOR IP): Performed by: FAMILY MEDICINE

## 2021-10-26 PROCEDURE — 6370000000 HC RX 637 (ALT 250 FOR IP): Performed by: SURGERY

## 2021-10-26 PROCEDURE — 2580000003 HC RX 258: Performed by: SURGERY

## 2021-10-26 PROCEDURE — 2060000000 HC ICU INTERMEDIATE R&B

## 2021-10-26 RX ORDER — POLYETHYLENE GLYCOL 3350 17 G/17G
17 POWDER, FOR SOLUTION ORAL DAILY
Status: DISCONTINUED | OUTPATIENT
Start: 2021-10-26 | End: 2021-10-27 | Stop reason: HOSPADM

## 2021-10-26 RX ORDER — BACITRACIN 500 [USP'U]/G
OINTMENT TOPICAL 4 TIMES DAILY PRN
Status: DISCONTINUED | OUTPATIENT
Start: 2021-10-26 | End: 2021-10-27 | Stop reason: HOSPADM

## 2021-10-26 RX ORDER — ONDANSETRON 4 MG/1
4 TABLET, ORALLY DISINTEGRATING ORAL EVERY 8 HOURS PRN
Qty: 10 TABLET | Refills: 0 | Status: SHIPPED | OUTPATIENT
Start: 2021-10-26

## 2021-10-26 RX ADMIN — METOCLOPRAMIDE 10 MG: 5 INJECTION, SOLUTION INTRAMUSCULAR; INTRAVENOUS at 13:43

## 2021-10-26 RX ADMIN — POLYETHYLENE GLYCOL 3350 17 G: 17 POWDER, FOR SOLUTION ORAL at 13:53

## 2021-10-26 RX ADMIN — ENOXAPARIN SODIUM 80 MG: 80 INJECTION SUBCUTANEOUS at 20:26

## 2021-10-26 RX ADMIN — SODIUM CHLORIDE, PRESERVATIVE FREE 10 ML: 5 INJECTION INTRAVENOUS at 20:28

## 2021-10-26 RX ADMIN — SODIUM CHLORIDE, PRESERVATIVE FREE 10 ML: 5 INJECTION INTRAVENOUS at 10:32

## 2021-10-26 RX ADMIN — ENOXAPARIN SODIUM 80 MG: 80 INJECTION SUBCUTANEOUS at 10:31

## 2021-10-26 RX ADMIN — PANTOPRAZOLE SODIUM 40 MG: 40 INJECTION, POWDER, FOR SOLUTION INTRAVENOUS at 10:31

## 2021-10-26 RX ADMIN — ONDANSETRON 4 MG: 4 TABLET, ORALLY DISINTEGRATING ORAL at 23:29

## 2021-10-26 ASSESSMENT — PAIN SCALES - GENERAL
PAINLEVEL_OUTOF10: 0

## 2021-10-26 NOTE — PROGRESS NOTES
Increased tube feed  flush to 75 mL every 4 hours. Patient administered protein supplement.  Electronically signed by Gianni Patel RN on 10/26/2021 at 5:02 PM

## 2021-10-26 NOTE — CARE COORDINATION
Care coordination: Met with patient to discuss role of case management/ care transitions. Patient is POD #2 postpyloric jejunostomy tube placement and removal of G tube. [facility] is active with [facility] for nursing/ PT/ OT and tube feeds. MARCUS orders placed. Currently patient is on 1l/nc post thoracentesis, if O2 is needed at discharge patient would like [facility]. Will need walking pulse ox. MVI able to see patient tomorrow  (10/27) to set up [address].     Nacho Otero, RN  Case Management

## 2021-10-26 NOTE — PROGRESS NOTES
Subjective: The patient is awake and alert. Resting in bed  No acute events overnight. Denies chest pain, angina, SOB   No acute complaints today with clamped PEG    Objective:    /62   Pulse 89   Temp 97.9 °F (36.6 °C) (Oral)   Resp 16   Ht 5' 7\" (1.702 m)   Wt 152 lb 8.9 oz (69.2 kg)   SpO2 95%   BMI 23.89 kg/m²     In: 720 [NG/GT:720]  Out: 2325   In: 720   Out: 2325 [Urine:500]    General appearance: NAD, conversant, pleasant  HEENT: AT/NC, MMM  Neck: FROM, supple  Lungs: Diminished throughout   CV: RRR, no MRGs  Vasc: Radial pulses 2+  Abdomen: Soft, non-tender; no masses or HSM, peg tube   Extremities: No peripheral edema or digital cyanosis  Skin: no rash, lesions or ulcers  Psych: Alert and oriented to person, place and time  Neuro: Alert and interactive     Recent Labs     10/24/21  0430 10/25/21  0340   WBC 5.1 4.3*   HGB 9.3* 9.0*   HCT 30.8* 29.4*    366       Recent Labs     10/24/21  0430 10/25/21  0340    139   K 3.6 3.6   CL 94* 97*   CO2 36* 37*   BUN 21 21   CREATININE 0.7 0.6   CALCIUM 8.6 8.2*       Assessment:    Principal Problem:    PEG tube malfunction (HCC)  Active Problems:    Hypoxia    Severe protein-calorie malnutrition ([address]  Resolved Problems:    * No resolved hospital problems.  *      Plan:  Patient is a 67year old female with a PMH of depression, GERD, HTN and a recent whipple procedure at [facility] admitted to telemetry for      PEJ tube malfunction   -General surgery consulted  -Monitor labs  -Successful postpyloric jejunostomy tube placement 10/24/2021 POD#1 patient tolerating tube feeds and PO   -G tube was clamped today-patient tolerating well  -PRN Reglan   -Can d/c IVF when dietary makes recommendations on water flushes.  -Dietitian consulted - will use recs from [facility] for now  -monitor blood sugars     Pleural Effusion  -Thoracentesis today - 360 removed of serosanguinous fluid-likely transudative  -Repeat CXR:   -Pulse ox testing for possible home O2  -Encourage ISP   -No need for Pleurx catheter  -Echocardiogram unremarkable with ejection fraction 60%  Appreciate pulmonology input    DVT Prophylaxis   PT/OT  Discharge planning-next 24 hours once home health care is set up    Yany Minor MD  6:43 PM

## 2021-10-26 NOTE — PROGRESS NOTES
10/26/2021  3:48 PM           Nutrition Note    Pt's daughter concerned that pt is not getting adequate flush/fluid via J-tube. She stated that per [facility] rec, she was giving 120 ml flushes Q 4 hrs via J-tube. Previous recommendation for 75 ml every 4 hrs flush per 10/25 TF Consult. This will provide additional 450 ml/d and TF+this flush approp to meet pt fluid needs. Encouraged pt's daughter to continue this regimen after discharge. Current TF: Standard Without Fiber (Osmolite 1.2) to goal rate 55 ml/hr and Protein Modular once daily with 75 ml flush Q4 hr  This provides: 1320 ml/d, 1684 imani, 99 g pro, 1532 ml total free water  This regimen will meet 100% calorie, protein and fluid needs     Estimated Daily Nutrient Needs:  Energy (kcal):  5560-5587;  Weight Used for Energy Requirements:  Current     Protein (g):  1.5-1.8 gm/kg per IBW= ; Weight Used for Protein Requirements:  Ideal        Fluid (ml/day):  4651-3961; Method Used for Fluid Requirements:  1 ml/kcal     Electronically signed by Yloanda Broderick RD, CNSC, LD on 10/26/21 at 3:48 PM EDT    Contact: [phone]

## 2021-10-26 NOTE — PROGRESS NOTES
Germain Farfan M.D.,[facility]  Samia Colbert D.O., F.A.C.O.I., Crys Doll M.D. Nery Park M.D. Shiva Garcia D.O. Daily Pulmonary Progress Note    Patient:  Hilary Mejia 67 y.o. female MRN: [id number]     Date of Service: 10/26/2021      Synopsis     We are following patient for moderate pleural effusion, thoracentesis    \"CC\" PEG tube malfunction    Code status: Full      Subjective      Patient was seen and examined. Laying in bed on 1 L nasal cannula without distress. Patient admits to using her incentive spirometer. Patient states her breathing is much better status post thoracentesis yesterday. Review of Systems:  Constitutional: Denies fever, weight loss, night sweats, and fatigue  Skin: Denies pigmentation, dark lesions, and rashes   HEENT: Denies hearing loss, tinnitus, ear drainage, epistaxis, sore throat, and hoarseness. Cardiovascular: Denies palpitations, chest pain, and chest pressure. Respiratory: Denies cough, dyspnea at rest, hemoptysis, apnea, and choking.   Gastrointestinal: Denies nausea, vomiting, poor appetite, diarrhea, heartburn or reflux  Genitourinary: Denies dysuria, frequency, urgency or hematuria  Musculoskeletal: Denies myalgias, muscle weakness, and bone pain  Neurological: Denies dizziness, vertigo, headache, and focal weakness  Psychological: Denies anxiety and depression  Endocrine: Denies heat intolerance and cold intolerance  Hematopoietic/Lymphatic: Denies bleeding problems and blood transfusions    24-hour events:  thora 360cc off    Objective   Vitals: BP (!) 101/58   Pulse 96   Temp 97.8 °F (36.6 °C) (Axillary)   Resp 18   Ht 5' 7\" (1.702 m)   Wt 152 lb 8.9 oz (69.2 kg)   SpO2 95%   BMI 23.89 kg/m²     I/O:    Intake/Output Summary (Last 24 hours) at 10/26/2021 1312  Last data filed at 10/26/2021 1234  Gross per 24 hour   Intake 720 ml   Output 2250 ml   Net -1530 ml       Vent Information  SpO2: 95 %      CURRENT MEDS :  Scheduled Meds:   enoxaparin  80 mg SubCUTAneous BID    pantoprazole  40 mg IntraVENous Daily    sodium chloride flush  10 mL IntraVENous 2 times per day       Physical Exam:  General Appearance: appears comfortable in no acute distress. HEENT: Normocephalic atraumatic without obvious abnormality   Neck: Lips, mucosa, and tongue normal.  Supple, symmetrical, trachea midline, no adenopathy;thyroid:  no enlargement/tenderness/nodules or JVD. Lung: Breath sounds CTA, left diminished. Respirations unlabored. Symmetrical expansion. Heart: RRR, normal S1, S2. No MRG  Abdomen: Soft, NT, ND. BS present x 4 quadrants. No bruit or organomegaly. Extremities: Pedal pulses 2+ symmetric b/l. Extremities normal, no cyanosis, clubbing, or edema. Musculokeletal: No joint swelling, no muscle tenderness. ROM normal in all joints of extremities. Neurologic: Mental status: Alert and Oriented X3 . Pertinent/ New Labs and Imaging Studies     Imaging Personally Reviewed:  CXR 10/25 s/p thoracentesis  Reduced left pleural fluid volume following thoracentesis.  No pneumothorax. ECHO 10/25/21   Normal left ventricular chamber size. Normal left ventricular systolic   function. Visually estimated LVEF is 60 %. No wall motion abnormalities. Grade I diastolic dysfunction. Normal right ventricle structure and function. Normal left atrium. Normal right atrium. Less than mild valvular regurgitation/ stenosis. Unable to estimate PA pressure. No comparison study available.       Signature      ----------------------------------------------------------------   Electronically signed by Danny Solano MD(Interpreting   physician) on 10/25/2021 03:35 PM      Labs:  Lab Results   Component Value Date    WBC 4.3 10/25/2021    HGB 9.0 10/25/2021    HCT 29.4 10/25/2021    MCV 85.7 10/25/2021    MCH 26.2 10/25/2021    MCHC 30.6 10/25/2021    RDW 14.8 10/25/2021     10/25/2021    MPV 10.2 10/25/2021     Lab Results Component Value Date     10/25/2021    K 3.6 10/25/2021    K 3.6 10/24/2021    CL 97 10/25/2021    CO2 37 10/25/2021    BUN 21 10/25/2021    CREATININE 0.6 10/25/2021    LABALBU 2.8 10/23/2021    CALCIUM 8.2 10/25/2021    GFRAA >60 10/25/2021    LABGLOM >60 10/25/2021     Lab Results   Component Value Date    PROTIME 12.8 10/25/2021    INR 1.2 10/25/2021     No results for input(s): PROBNP in the last 72 hours. No results for input(s): PROCAL in the last 72 hours. This SmartLink has not been configured with any valid records. Micro:  No results for input(s): CULTRESP in the last 72 hours. Recent Labs     10/25/21  0855   LABGRAM Refer to ordered Gram stain for results     No results for input(s): LEGUR in the last 72 hours. No results for input(s): STREPNEUMAGU in the last 72 hours. No results for input(s): LP1UAG in the last 72 hours. Assessment:    1. Acute hypoxic respiratory failure with hypoxia  2. Left pleural effusion with compressive atelectasis  3. Small area of scar in right lung apex. 4. 9/9/2021 Whipple procedure at [facility] per Dr. Connie Holder  5. Pancreatic cyst pathology negative for malignant cells  6. NG malfunction  7. GERD  8. Hypertension  9. History of nicotine dependence in remission      Plan:   1. Oxygen 1 L via nasal cannula, wean to keep sats greater than 92%  2. Check ambulatory pulse ox  3. Thoracentesis fluid cultures and cytology pending  4. Lung recruitment maneuvers-incentive spirometer, increase activity as tolerated  5. GI/DVT prophylaxis      This plan of care was reviewed in collaboration with Dr. Andrew Young  Electronically signed by NAMRATA Chance CNP on 10/26/2021 at 1:12 PM      I personally saw, examined, and cared for the patient. Labs, medications, radiographs reviewed. I agree with history exam and plans detailed in NP note. Patient clinically doing well. Oxygen has been weaned. Check amatory pulse ox prior to discharge.     Patient has had recurrent left pleural effusion that is been an ongoing issue since having a Whipple procedure done at [facility]. Effusion borderline exudate. May be inflammatory or sympathetic effusion related to procedure. Patient also with malnutrition which may be a component. Gram stain and cytology negative. May discharge from a pulmonary standpoint with repeat chest x-ray in a few weeks with repeat follow-up. Reviewed with daughter at length. OP f/u to be arranged. Pulm sign off at this time.     Autumn Mutters, DO

## 2021-10-26 NOTE — PROGRESS NOTES
Pulse ox was ___93___% on room air at rest.  Ambulated patient on room air. Oxygen saturation was __85__% on room air while ambulating. Oxygen applied. Recovery pulse ox was ___92___% on __1__liters of oxygen while ambulating.  Electronically signed by Aguila Kim RN on 10/26/2021 at 4:44 PM

## 2021-10-27 VITALS
RESPIRATION RATE: 16 BRPM | TEMPERATURE: 97.9 F | OXYGEN SATURATION: 93 % | BODY MASS INDEX: 26.33 KG/M2 | HEIGHT: 67 IN | DIASTOLIC BLOOD PRESSURE: 65 MMHG | SYSTOLIC BLOOD PRESSURE: 117 MMHG | HEART RATE: 93 BPM | WEIGHT: 167.77 LBS

## 2021-10-27 LAB
ANION GAP SERPL CALCULATED.3IONS-SCNC: 7 MMOL/L (ref 7–16)
BASOPHILS ABSOLUTE: 0.03 E9/L (ref 0–0.2)
BASOPHILS RELATIVE PERCENT: 0.5 % (ref 0–2)
BUN BLDV-MCNC: 20 MG/DL (ref 6–23)
CALCIUM SERPL-MCNC: 7.8 MG/DL (ref 8.6–10.2)
CHLORIDE BLD-SCNC: 91 MMOL/L (ref 98–107)
CO2: 35 MMOL/L (ref 22–29)
CREAT SERPL-MCNC: 0.5 MG/DL (ref 0.5–1)
EOSINOPHILS ABSOLUTE: 0.13 E9/L (ref 0.05–0.5)
EOSINOPHILS RELATIVE PERCENT: 2 % (ref 0–6)
GFR AFRICAN AMERICAN: >60
GFR NON-AFRICAN AMERICAN: >60 ML/MIN/1.73
GLUCOSE BLD-MCNC: 208 MG/DL (ref 74–99)
HCT VFR BLD CALC: 30 % (ref 34–48)
HEMOGLOBIN: 9.1 G/DL (ref 11.5–15.5)
IMMATURE GRANULOCYTES #: 0.05 E9/L
IMMATURE GRANULOCYTES %: 0.8 % (ref 0–5)
LYMPHOCYTES ABSOLUTE: 1.4 E9/L (ref 1.5–4)
LYMPHOCYTES RELATIVE PERCENT: 21.9 % (ref 20–42)
MCH RBC QN AUTO: 25.9 PG (ref 26–35)
MCHC RBC AUTO-ENTMCNC: 30.3 % (ref 32–34.5)
MCV RBC AUTO: 85.5 FL (ref 80–99.9)
METER GLUCOSE: 180 MG/DL (ref 74–99)
MONOCYTES ABSOLUTE: 0.33 E9/L (ref 0.1–0.95)
MONOCYTES RELATIVE PERCENT: 5.2 % (ref 2–12)
NEUTROPHILS ABSOLUTE: 4.44 E9/L (ref 1.8–7.3)
NEUTROPHILS RELATIVE PERCENT: 69.6 % (ref 43–80)
PDW BLD-RTO: 14.4 FL (ref 11.5–15)
PLATELET # BLD: 357 E9/L (ref 130–450)
PMV BLD AUTO: 9.8 FL (ref 7–12)
POTASSIUM SERPL-SCNC: 3.6 MMOL/L (ref 3.5–5)
RBC # BLD: 3.51 E12/L (ref 3.5–5.5)
SODIUM BLD-SCNC: 133 MMOL/L (ref 132–146)
WBC # BLD: 6.4 E9/L (ref 4.5–11.5)

## 2021-10-27 PROCEDURE — 2580000003 HC RX 258: Performed by: SURGERY

## 2021-10-27 PROCEDURE — 6370000000 HC RX 637 (ALT 250 FOR IP): Performed by: FAMILY MEDICINE

## 2021-10-27 PROCEDURE — 80048 BASIC METABOLIC PNL TOTAL CA: CPT

## 2021-10-27 PROCEDURE — 2700000000 HC OXYGEN THERAPY PER DAY

## 2021-10-27 PROCEDURE — 36415 COLL VENOUS BLD VENIPUNCTURE: CPT

## 2021-10-27 PROCEDURE — 6360000002 HC RX W HCPCS: Performed by: SURGERY

## 2021-10-27 PROCEDURE — 82962 GLUCOSE BLOOD TEST: CPT

## 2021-10-27 PROCEDURE — 6360000002 HC RX W HCPCS: Performed by: INTERNAL MEDICINE

## 2021-10-27 PROCEDURE — 85025 COMPLETE CBC W/AUTO DIFF WBC: CPT

## 2021-10-27 PROCEDURE — C9113 INJ PANTOPRAZOLE SODIUM, VIA: HCPCS | Performed by: SURGERY

## 2021-10-27 RX ADMIN — PANTOPRAZOLE SODIUM 40 MG: 40 INJECTION, POWDER, FOR SOLUTION INTRAVENOUS at 09:51

## 2021-10-27 RX ADMIN — ONDANSETRON 4 MG: 2 INJECTION INTRAMUSCULAR; INTRAVENOUS at 06:51

## 2021-10-27 RX ADMIN — SODIUM CHLORIDE, PRESERVATIVE FREE 10 ML: 5 INJECTION INTRAVENOUS at 09:51

## 2021-10-27 RX ADMIN — POLYETHYLENE GLYCOL 3350 17 G: 17 POWDER, FOR SOLUTION ORAL at 09:51

## 2021-10-27 RX ADMIN — METOCLOPRAMIDE 10 MG: 5 INJECTION, SOLUTION INTRAMUSCULAR; INTRAVENOUS at 12:44

## 2021-10-27 RX ADMIN — ENOXAPARIN SODIUM 80 MG: 80 INJECTION SUBCUTANEOUS at 09:51

## 2021-10-27 ASSESSMENT — PAIN SCALES - GENERAL
PAINLEVEL_OUTOF10: 0
PAINLEVEL_OUTOF10: 0

## 2021-10-27 NOTE — PROGRESS NOTES
Physician Progress Note      Constance Lambert  [facility] #:                  [id number]  :                       1945  ADMIT DATE:       10/23/2021 4:47 PM  DISCH DATE:  RESPONDING  PROVIDER #:        Melissa Bacon DO          QUERY TEXT:    Dr. Rasta Britton,    Patient admitted with PEG tube malfunction and pleural effusion. Noted   documentation of acute respiratory failure in the 10/25 consult note . In   order to support the diagnosis of acute respiratory failure, please include   additional clinical indicators in your documentation. Or please document if   the diagnosis of acute respiratory failure has been ruled out after further   study. The medical record reflects the following:  Risk Factors: Pleural effusion  Clinical Indicators: Documented respirations 12-20, 88% on room air, patient   placed on 1-2L O2, per the 10/25 consult note \"Denies cough, dyspnea at rest,   hemoptysis, apnea, and choking\"  Treatment: Supplemental O2, Thoracentesis, incentive spirometer    Thank you,  Tangela Rowe RN  Clinical Documentation Improvement  [phone]    Acute Respiratory Failure Clinical Indicators per  MS-DRG Training Guide and   Quick Reference Guide:  pO2 < 60 mmHg or SpO2 (pulse oximetry) < 91% breathing room air  pCO2 > 50 and pH < 7.35  P/F ratio (pO2 / FIO2) < 300  pO2 decrease or pCO2 increase by 10 mmHg from baseline (if known)  Supplemental oxygen of 40% or more  Presence of respiratory distress, tachypnea, dyspnea, shortness of breath,   wheezing  Unable to speak in complete sentences  Use of accessory muscles to breathe  Extreme anxiety and feeling of impending doom  Tripod position  Confusion/altered mental status/obtunded  Options provided:  -- Acute Respiratory Failure as evidenced by, Please document evidence.   -- Acute Respiratory Failure ruled out after study  -- Other - I will add my own diagnosis  -- Disagree - Not applicable / Not valid  -- Disagree - Clinically unable to determine / Unknown  -- Refer to Clinical Documentation Reviewer    PROVIDER RESPONSE TEXT:    This patient is in acute respiratory failure as evidenced by see walk test   note 10/26 from [facility].  Desaturation to 85% on room air    Query created by: Sanjana Carlson on 10/26/2021 1:23 PM      Electronically signed by:  Isis Parker DO 10/27/2021 8:29 AM

## 2021-10-27 NOTE — PROGRESS NOTES
Patient complaining of indigestion, then had small emesis, G tube opened for 45minutes then reclamped. Patient give zofran SL, and resting comfortably on reassessment.

## 2021-10-27 NOTE — CARE COORDINATION
Care coordination: Patient is POD #3 postpyloric jejunostomy tube placement and removal of G tube.      Patient is active with [facility] for nursing/ PT/ OT and tube feeds.  MARCUS orders placed. MVI spoke with daughter who is an RN to coordinate initial care. MVI will be able to see pt today if daughter needs. Currently patient is on 1l/nc referral for oxygen made to [address] [id number] (as [facility] is out of network with patients insurance) --pulse ox testing and order are completed.      Paulina Fan, RN  Case Management

## 2021-10-28 LAB
BODY FLUID CULTURE, STERILE: NORMAL
GRAM STAIN RESULT: NORMAL

## 2021-11-13 NOTE — DISCHARGE SUMMARY
Physician Discharge Summary     Patient ID:  Gracia Age  [id number]  67 y.o.  1945    Admit date: 10/23/2021    Discharge date and time: 10/27/2021    Admission Diagnoses:   Patient Active Problem List   Diagnosis    Hypoxia    Pleural effusion    Delayed gastric emptying    IPMN (intraductal papillary mucinous neoplasm)    PEG tube malfunction (HCC)    Severe protein-calorie malnutrition ([address]       Discharge Diagnoses: PEJ tube malfunction    Consults: pulmonary/intensive care and general surgery    Procedures: Removal of gastorstomy tube, EGD with jejunnostomy tube placement    Hospital Course: The patient is a 67 y.o. female of Mirtha Acosta MD who presents with peg tube malfunction. PEJ tube malfunction   -General surgery consulted  -Monitor labs  -Successful postpyloric jejunostomy tube placement 10/24/2021 POD#1 patient tolerating tube feeds and PO   -G tube was clamped today-patient tolerating well  -PRN Reglan   -Can d/c IVF when dietary makes recommendations on water flushes.  -Dietitian consulted - will use recs from [facility] for now  -monitor blood sugars      Pleural Effusion  -Thoracentesis today - 360 removed of serosanguinous fluid-likely transudative  -Repeat CXR:   -Pulse ox testing for possible home O2  -Encourage ISP   -No need for Pleurx catheter  -Echocardiogram unremarkable with ejection fraction 60%  Appreciate pulmonology input    Patient discharged home in stable conditions with medications listed below. Patient instructed to follow up with all consultants and PCP within 3 weeks of discharge. No results for input(s): WBC, HGB, HCT, PLT in the last 72 hours. No results for input(s): NA, K, CL, CO2, BUN, CREATININE, CALCIUM in the last 72 hours. Invalid input(s): GLU    XR ABDOMEN (KUB) (SINGLE AP VIEW)    Result Date: 10/23/2021  EXAMINATION: ONE SUPINE XRAY VIEW(S) OF THE ABDOMEN 10/23/2021 3:45 pm COMPARISON: None.  HISTORY: ORDERING SYSTEM PROVIDED HISTORY: Peribronchial thickening suggesting acute or chronic bronchitis. 5. Stable irregular nodule in right lung apex may represent focal fibrosis. 6. Additional subcentimeter nodules in superior segment right lower lobe and right middle lobe also appears similar in size compared to prior. Follow-up recommended as indicated below. RECOMMENDATIONS: Fleischner Society guidelines for follow-up and management of incidentally detected pulmonary nodules: Single Solid Nodule: Nodule size less than 6 mm In a low-risk patient, no routine follow-up. In a high-risk patient, optional CT at 12 months. Nodule size equals 6-8 mm In a low-risk patient, CT at 6-12 months, then consider CT at 18-24 months. In a high-risk patient, CT at 6-12 months, then CT at 18-24 months. Nodule size greater than 8 mm In a low-risk patient, consider CT at 3 months, PET/CT, or tissue sampling. In a high-risk patient, consider CT at 3 months, PET/CT, or tissue sampling. Multiple Solid Nodules: Nodule size less than 6 mm In a low-risk patient, no routine follow-up. In a high-risk patient, optional CT at 12 months. Nodule size equals 6-8 mm In a low-risk patient, CT at 3-6 months, then consider CT at 18-24 months. In a high-risk patient, CT at 3-6 months, then CT at 18-24 months. Nodule size greater than 8 mm In a low-risk patient, CT at 3-6 months, then consider CT at 18-24 months. In a high-risk patient, CT at 3-6 months, then CT at 18-24 months. - Low risk patients include individuals with minimal or absent history of smoking and other known risk factors. - High risk patients include individuals with a history or smoking or known risk factors. Radiology 2017 http://pubs. rsna.org/doi/full/10.1148/radiol. 0538258578     XR ABDOMEN FOR NG/OG/NE TUBE PLACEMENT    Result Date: 10/23/2021  EXAMINATION: ONE SUPINE XRAY VIEW(S) OF THE ABDOMEN 10/23/2021 5:45 pm COMPARISON: Today at 1559 hours HISTORY: ORDERING SYSTEM PROVIDED HISTORY: Confirmation of course of NG/OG/NE tube and location of tip of tube TECHNOLOGIST PROVIDED HISTORY: Reason for exam:->Confirmation of course of NG/OG/NE tube and location of tip of tube FINDINGS: Following the injection of Gastroview through a gastric PEG tube, there is opacification of the gastric mucosa. No evidence of contrast extravasation. Satisfactory position of gastric PEG tube. Discharge Exam:    HEENT: NCAT,  PERRLA, No JVD  Heart:  RRR, no murmurs, gallops, or rubs. Lungs:  CTA bilaterally, no wheeze, rales or rhonchi  Abd: bowel sounds present, nontender, nondistended, no masses  Extrem:  No clubbing, cyanosis, or edema    Disposition: home     Patient Condition at Discharge: Stable    Patient Instructions:      Medication List      START taking these medications    ondansetron 4 MG disintegrating tablet  Commonly known as: ZOFRAN-ODT  Take 1 tablet by mouth every 8 hours as needed for Nausea or Vomiting        CONTINUE taking these medications    blood glucose monitor kit and supplies  Dispense sufficient amount for indicated testing frequency plus additional to accommodate PRN testing needs. Dispense all needed supplies to include: monitor, strips, lancing device, lancets, control solutions, alcohol swabs. enoxaparin 80 MG/0.8ML injection  Commonly known as: LOVENOX     metoclopramide 10 MG tablet  Commonly known as: REGLAN  Take 1 tablet by mouth daily     pantoprazole 40 MG tablet  Commonly known as: PROTONIX           Where to Get Your Medications      These medications were sent to [address] [place] [address],[address], [facility] [phone] [facility] [phone] [address] [facility]    Phone: [phone]   · ondansetron 4 MG disintegrating tablet       Activity: activity as tolerated  Diet: cardiac diet and renal diet    Pt has been advised to: Follow-up with Bria Wild MD in 1 week.   Follow-up with consultants as recommended by them    Note that over 30 minutes was spent in preparing discharge papers, discussing discharge with patient, medication review, etc.    Signed:  Vinay Heredia MD  11/13/2021  7:56 AM

## 2022-08-12 ENCOUNTER — HOSPITAL ENCOUNTER (OUTPATIENT)
Dept: MAMMOGRAPHY | Age: 77
Discharge: HOME | End: 2022-08-14
Payer: MEDICARE

## 2022-08-12 DIAGNOSIS — Z12.31 ENCOUNTER FOR SCREENING MAMMOGRAM FOR MALIGNANT NEOPLASM OF BREAST: ICD-10-CM

## 2022-08-12 PROCEDURE — 77063 BREAST TOMOSYNTHESIS BI: CPT

## 2022-10-17 ENCOUNTER — HOSPITAL ENCOUNTER (OUTPATIENT)
Age: 77
Discharge: HOME | End: 2022-10-19

## 2022-10-17 PROCEDURE — 88305 TISSUE EXAM BY PATHOLOGIST: CPT

## 2023-03-02 ENCOUNTER — HOSPITAL ENCOUNTER (OUTPATIENT)
Dept: GENERAL RADIOLOGY | Age: 78
Discharge: HOME | End: 2023-03-04
Payer: MEDICARE

## 2023-03-02 ENCOUNTER — HOSPITAL ENCOUNTER (OUTPATIENT)
Age: 78
Discharge: HOME | End: 2023-03-04
Payer: MEDICARE

## 2023-03-02 DIAGNOSIS — R06.02 SOB (SHORTNESS OF BREATH): ICD-10-CM

## 2023-03-02 PROCEDURE — 71046 X-RAY EXAM CHEST 2 VIEWS: CPT

## 2023-04-23 ENCOUNTER — HOSPITAL ENCOUNTER (EMERGENCY)
Age: 78
Discharge: HOME | End: 2023-04-23
Payer: MEDICARE

## 2023-04-23 ENCOUNTER — APPOINTMENT (OUTPATIENT)
Dept: GENERAL RADIOLOGY | Age: 78
End: 2023-04-23
Payer: MEDICARE

## 2023-04-23 VITALS
WEIGHT: 170 LBS | SYSTOLIC BLOOD PRESSURE: 160 MMHG | OXYGEN SATURATION: 94 % | TEMPERATURE: 99 F | HEART RATE: 91 BPM | HEIGHT: 67 IN | BODY MASS INDEX: 26.68 KG/M2 | DIASTOLIC BLOOD PRESSURE: 79 MMHG | RESPIRATION RATE: 18 BRPM

## 2023-04-23 DIAGNOSIS — R05.1 ACUTE COUGH: ICD-10-CM

## 2023-04-23 DIAGNOSIS — U07.1 COVID-19: Primary | ICD-10-CM

## 2023-04-23 LAB
ANION GAP SERPL CALCULATED.3IONS-SCNC: 10 MMOL/L (ref 7–16)
BASOPHILS # BLD: 0 E9/L (ref 0–0.2)
BASOPHILS NFR BLD: 0 % (ref 0–2)
BUN SERPL-MCNC: 9 MG/DL (ref 6–23)
BURR CELLS: ABNORMAL
CALCIUM SERPL-MCNC: 7.9 MG/DL (ref 8.6–10.2)
CHLORIDE SERPL-SCNC: 102 MMOL/L (ref 98–107)
CO2 SERPL-SCNC: 25 MMOL/L (ref 22–29)
CREAT SERPL-MCNC: 0.7 MG/DL (ref 0.5–1)
EOSINOPHIL # BLD: 0 E9/L (ref 0.05–0.5)
EOSINOPHIL NFR BLD: 0 % (ref 0–6)
ERYTHROCYTE [DISTWIDTH] IN BLOOD BY AUTOMATED COUNT: 15.3 FL (ref 11.5–15)
GLUCOSE SERPL-MCNC: 180 MG/DL (ref 74–99)
HCT VFR BLD AUTO: 38.9 % (ref 34–48)
HGB BLD-MCNC: 12.2 G/DL (ref 11.5–15.5)
INFLUENZA A BY PCR: NOT DETECTED
INFLUENZA B BY PCR: NOT DETECTED
LYMPHOCYTES # BLD: 0.32 E9/L (ref 1.5–4)
LYMPHOCYTES NFR BLD: 3.5 % (ref 20–42)
MCH RBC QN AUTO: 27 PG (ref 26–35)
MCHC RBC AUTO-ENTMCNC: 31.4 % (ref 32–34.5)
MCV RBC AUTO: 86.1 FL (ref 80–99.9)
MONOCYTES # BLD: 0.22 E9/L (ref 0.1–0.95)
MONOCYTES NFR BLD: 4.4 % (ref 2–12)
NEUTROPHILS # BLD: 4.81 E9/L (ref 1.8–7.3)
NEUTS SEG NFR BLD: 88.6 % (ref 43–80)
NRBC BLD-RTO: 0 /100 WBC
OVALOCYTES: ABNORMAL
PLATELET # BLD AUTO: 177 E9/L (ref 130–450)
PMV BLD AUTO: 10.7 FL (ref 7–12)
POIKILOCYTES: ABNORMAL
POTASSIUM SERPL-SCNC: 3.7 MMOL/L (ref 3.5–5)
PROMYELOCYTES NFR BLD MANUAL: 0.9 % (ref 0–0)
RBC # BLD AUTO: 4.52 E12/L (ref 3.5–5.5)
SARS-COV-2 RDRP RESP QL NAA+PROBE: DETECTED
SCHISTOCYTES: ABNORMAL
SODIUM SERPL-SCNC: 137 MMOL/L (ref 132–146)
TOXIC GRANULATION: ABNORMAL
VARIANT LYMPHS NFR BLD: 2.6 % (ref 0–4)
WBC # BLD: 5.4 E9/L (ref 4.5–11.5)

## 2023-04-23 PROCEDURE — 87635 SARS-COV-2 COVID-19 AMP PRB: CPT

## 2023-04-23 PROCEDURE — 85025 COMPLETE CBC W/AUTO DIFF WBC: CPT

## 2023-04-23 PROCEDURE — 99284 EMERGENCY DEPT VISIT MOD MDM: CPT

## 2023-04-23 PROCEDURE — 6370000000 HC RX 637 (ALT 250 FOR IP): Performed by: NURSE PRACTITIONER

## 2023-04-23 PROCEDURE — 87502 INFLUENZA DNA AMP PROBE: CPT

## 2023-04-23 PROCEDURE — 71046 X-RAY EXAM CHEST 2 VIEWS: CPT

## 2023-04-23 PROCEDURE — 80048 BASIC METABOLIC PNL TOTAL CA: CPT

## 2023-04-23 RX ORDER — BENZONATATE 100 MG/1
100-200 CAPSULE ORAL 3 TIMES DAILY PRN
Qty: 60 CAPSULE | Refills: 0 | Status: SHIPPED | OUTPATIENT
Start: 2023-04-23 | End: 2023-04-30

## 2023-04-23 RX ORDER — IBUPROFEN 600 MG/1
600 TABLET ORAL ONCE
Status: COMPLETED | OUTPATIENT
Start: 2023-04-23 | End: 2023-04-23

## 2023-04-23 RX ORDER — BENZONATATE 100 MG/1
200 CAPSULE ORAL ONCE
Status: COMPLETED | OUTPATIENT
Start: 2023-04-23 | End: 2023-04-23

## 2023-04-23 RX ORDER — ALBUTEROL SULFATE 90 UG/1
2 AEROSOL, METERED RESPIRATORY (INHALATION) 4 TIMES DAILY PRN
Qty: 18 G | Refills: 0 | Status: SHIPPED | OUTPATIENT
Start: 2023-04-23

## 2023-04-23 RX ORDER — ACETAMINOPHEN 325 MG/1
650 TABLET ORAL ONCE
Status: COMPLETED | OUTPATIENT
Start: 2023-04-23 | End: 2023-04-23

## 2023-04-23 RX ADMIN — ACETAMINOPHEN 650 MG: 325 TABLET ORAL at 09:44

## 2023-04-23 RX ADMIN — BENZONATATE 200 MG: 100 CAPSULE ORAL at 09:44

## 2023-04-23 RX ADMIN — IBUPROFEN 600 MG: 600 TABLET, FILM COATED ORAL at 09:44

## 2023-08-30 ENCOUNTER — HOSPITAL ENCOUNTER (OUTPATIENT)
Dept: MAMMOGRAPHY | Age: 78
Discharge: HOME | End: 2023-09-01
Payer: MEDICARE

## 2023-08-30 VITALS — BODY MASS INDEX: 28.12 KG/M2 | WEIGHT: 175 LBS | HEIGHT: 66 IN

## 2023-08-30 DIAGNOSIS — Z12.31 ENCOUNTER FOR SCREENING MAMMOGRAM FOR MALIGNANT NEOPLASM OF BREAST: ICD-10-CM

## 2023-08-30 PROCEDURE — 77063 BREAST TOMOSYNTHESIS BI: CPT

## 2023-11-18 ENCOUNTER — APPOINTMENT (OUTPATIENT)
Dept: GENERAL RADIOLOGY | Age: 78
End: 2023-11-18
Payer: MEDICARE

## 2023-11-18 ENCOUNTER — HOSPITAL ENCOUNTER (EMERGENCY)
Age: 78
Discharge: LEFT BEFORE DISCHARGE | End: 2023-11-18
Attending: STUDENT IN AN ORGANIZED HEALTH CARE EDUCATION/TRAINING PROGRAM
Payer: MEDICARE

## 2023-11-18 ENCOUNTER — APPOINTMENT (OUTPATIENT)
Dept: CT IMAGING | Age: 78
End: 2023-11-18
Payer: MEDICARE

## 2023-11-18 VITALS
RESPIRATION RATE: 16 BRPM | HEIGHT: 67 IN | DIASTOLIC BLOOD PRESSURE: 85 MMHG | OXYGEN SATURATION: 97 % | TEMPERATURE: 97.7 F | WEIGHT: 180 LBS | HEART RATE: 97 BPM | SYSTOLIC BLOOD PRESSURE: 173 MMHG | BODY MASS INDEX: 28.25 KG/M2

## 2023-11-18 DIAGNOSIS — Z53.29 LEFT AGAINST MEDICAL ADVICE: ICD-10-CM

## 2023-11-18 DIAGNOSIS — G45.9 TIA (TRANSIENT ISCHEMIC ATTACK): Primary | ICD-10-CM

## 2023-11-18 LAB
ALBUMIN SERPL-MCNC: 4 G/DL (ref 3.5–5.2)
ALP SERPL-CCNC: 160 U/L (ref 35–104)
ALT SERPL-CCNC: 9 U/L (ref 0–32)
ANION GAP SERPL CALCULATED.3IONS-SCNC: 13 MMOL/L (ref 7–16)
AST SERPL-CCNC: 16 U/L (ref 0–31)
BASOPHILS # BLD: 0.03 K/UL (ref 0–0.2)
BASOPHILS NFR BLD: 1 % (ref 0–2)
BILIRUB SERPL-MCNC: 0.3 MG/DL (ref 0–1.2)
BILIRUB UR QL STRIP: NEGATIVE
BUN SERPL-MCNC: 16 MG/DL (ref 6–23)
CALCIUM SERPL-MCNC: 8.7 MG/DL (ref 8.6–10.2)
CHLORIDE SERPL-SCNC: 103 MMOL/L (ref 98–107)
CHP ED QC CHECK: NORMAL
CLARITY UR: CLEAR
CO2 SERPL-SCNC: 24 MMOL/L (ref 22–29)
COLOR UR: YELLOW
CREAT SERPL-MCNC: 0.9 MG/DL (ref 0.5–1)
EKG ATRIAL RATE: 95 BPM
EKG P AXIS: 48 DEGREES
EKG P-R INTERVAL: 154 MS
EKG Q-T INTERVAL: 360 MS
EKG QRS DURATION: 86 MS
EKG QTC CALCULATION (BAZETT): 452 MS
EKG R AXIS: -47 DEGREES
EKG T AXIS: 70 DEGREES
EKG VENTRICULAR RATE: 95 BPM
EOSINOPHIL # BLD: 0.04 K/UL (ref 0.05–0.5)
EOSINOPHILS RELATIVE PERCENT: 1 % (ref 0–6)
ERYTHROCYTE [DISTWIDTH] IN BLOOD BY AUTOMATED COUNT: 13.7 % (ref 11.5–15)
GFR SERPL CREATININE-BSD FRML MDRD: >60 ML/MIN/1.73M2
GLUCOSE BLD-MCNC: 287 MG/DL
GLUCOSE BLD-MCNC: 287 MG/DL (ref 74–99)
GLUCOSE SERPL-MCNC: 302 MG/DL (ref 74–99)
GLUCOSE UR STRIP-MCNC: 500 MG/DL
HCT VFR BLD AUTO: 41.8 % (ref 34–48)
HGB BLD-MCNC: 13 G/DL (ref 11.5–15.5)
HGB UR QL STRIP.AUTO: NEGATIVE
IMM GRANULOCYTES # BLD AUTO: <0.03 K/UL (ref 0–0.58)
IMM GRANULOCYTES NFR BLD: 0 % (ref 0–5)
INR PPP: 1
KETONES UR STRIP-MCNC: NEGATIVE MG/DL
LACTATE BLDV-SCNC: 2.9 MMOL/L (ref 0.5–2.2)
LEUKOCYTE ESTERASE UR QL STRIP: ABNORMAL
LYMPHOCYTES NFR BLD: 1.38 K/UL (ref 1.5–4)
LYMPHOCYTES RELATIVE PERCENT: 28 % (ref 20–42)
MAGNESIUM SERPL-MCNC: 2.2 MG/DL (ref 1.6–2.6)
MCH RBC QN AUTO: 27.3 PG (ref 26–35)
MCHC RBC AUTO-ENTMCNC: 31.1 G/DL (ref 32–34.5)
MCV RBC AUTO: 87.6 FL (ref 80–99.9)
MONOCYTES NFR BLD: 0.34 K/UL (ref 0.1–0.95)
MONOCYTES NFR BLD: 7 % (ref 2–12)
NEUTROPHILS NFR BLD: 64 % (ref 43–80)
NEUTS SEG NFR BLD: 3.16 K/UL (ref 1.8–7.3)
NITRITE UR QL STRIP: NEGATIVE
PH UR STRIP: 6 [PH] (ref 5–9)
PLATELET # BLD AUTO: 198 K/UL (ref 130–450)
PMV BLD AUTO: 10.1 FL (ref 7–12)
POTASSIUM SERPL-SCNC: 4.2 MMOL/L (ref 3.5–5)
PROT SERPL-MCNC: 6.5 G/DL (ref 6.4–8.3)
PROT UR STRIP-MCNC: NEGATIVE MG/DL
PROTHROMBIN TIME: 11.9 SEC (ref 9.3–12.4)
RBC # BLD AUTO: 4.77 M/UL (ref 3.5–5.5)
RBC #/AREA URNS HPF: ABNORMAL /HPF
SODIUM SERPL-SCNC: 140 MMOL/L (ref 132–146)
SP GR UR STRIP: <1.005 (ref 1–1.03)
TROPONIN I SERPL HS-MCNC: 12 NG/L (ref 0–9)
UROBILINOGEN UR STRIP-ACNC: 0.2 EU/DL (ref 0–1)
WBC #/AREA URNS HPF: ABNORMAL /HPF
WBC OTHER # BLD: 5 K/UL (ref 4.5–11.5)

## 2023-11-18 PROCEDURE — 70450 CT HEAD/BRAIN W/O DYE: CPT

## 2023-11-18 PROCEDURE — 96375 TX/PRO/DX INJ NEW DRUG ADDON: CPT

## 2023-11-18 PROCEDURE — 84484 ASSAY OF TROPONIN QUANT: CPT

## 2023-11-18 PROCEDURE — 85610 PROTHROMBIN TIME: CPT

## 2023-11-18 PROCEDURE — 70498 CT ANGIOGRAPHY NECK: CPT

## 2023-11-18 PROCEDURE — 80053 COMPREHEN METABOLIC PANEL: CPT

## 2023-11-18 PROCEDURE — 71045 X-RAY EXAM CHEST 1 VIEW: CPT

## 2023-11-18 PROCEDURE — 81001 URINALYSIS AUTO W/SCOPE: CPT

## 2023-11-18 PROCEDURE — 96361 HYDRATE IV INFUSION ADD-ON: CPT

## 2023-11-18 PROCEDURE — 6360000002 HC RX W HCPCS: Performed by: STUDENT IN AN ORGANIZED HEALTH CARE EDUCATION/TRAINING PROGRAM

## 2023-11-18 PROCEDURE — 6370000000 HC RX 637 (ALT 250 FOR IP): Performed by: STUDENT IN AN ORGANIZED HEALTH CARE EDUCATION/TRAINING PROGRAM

## 2023-11-18 PROCEDURE — 6360000004 HC RX CONTRAST MEDICATION: Performed by: RADIOLOGY

## 2023-11-18 PROCEDURE — 83735 ASSAY OF MAGNESIUM: CPT

## 2023-11-18 PROCEDURE — 99285 EMERGENCY DEPT VISIT HI MDM: CPT

## 2023-11-18 PROCEDURE — 83605 ASSAY OF LACTIC ACID: CPT

## 2023-11-18 PROCEDURE — 2580000003 HC RX 258: Performed by: STUDENT IN AN ORGANIZED HEALTH CARE EDUCATION/TRAINING PROGRAM

## 2023-11-18 PROCEDURE — 85025 COMPLETE CBC W/AUTO DIFF WBC: CPT

## 2023-11-18 PROCEDURE — 96374 THER/PROPH/DIAG INJ IV PUSH: CPT

## 2023-11-18 PROCEDURE — 82962 GLUCOSE BLOOD TEST: CPT

## 2023-11-18 PROCEDURE — 70496 CT ANGIOGRAPHY HEAD: CPT

## 2023-11-18 RX ORDER — 0.9 % SODIUM CHLORIDE 0.9 %
1000 INTRAVENOUS SOLUTION INTRAVENOUS ONCE
Status: COMPLETED | OUTPATIENT
Start: 2023-11-18 | End: 2023-11-18

## 2023-11-18 RX ORDER — ASPIRIN 325 MG
325 TABLET, DELAYED RELEASE (ENTERIC COATED) ORAL DAILY
Status: DISCONTINUED | OUTPATIENT
Start: 2023-11-18 | End: 2023-11-19 | Stop reason: HOSPADM

## 2023-11-18 RX ORDER — ACETAMINOPHEN 500 MG
1000 TABLET ORAL ONCE
Status: COMPLETED | OUTPATIENT
Start: 2023-11-18 | End: 2023-11-18

## 2023-11-18 RX ORDER — PROCHLORPERAZINE EDISYLATE 5 MG/ML
10 INJECTION INTRAMUSCULAR; INTRAVENOUS ONCE
Status: COMPLETED | OUTPATIENT
Start: 2023-11-18 | End: 2023-11-18

## 2023-11-18 RX ORDER — DIPHENHYDRAMINE HYDROCHLORIDE 50 MG/ML
12.5 INJECTION INTRAMUSCULAR; INTRAVENOUS ONCE
Status: COMPLETED | OUTPATIENT
Start: 2023-11-18 | End: 2023-11-18

## 2023-11-18 RX ORDER — CLOPIDOGREL BISULFATE 75 MG/1
75 TABLET ORAL DAILY
Qty: 21 TABLET | Refills: 0 | Status: SHIPPED | OUTPATIENT
Start: 2023-11-18 | End: 2023-12-09

## 2023-11-18 RX ORDER — ASPIRIN 81 MG/1
81 TABLET ORAL DAILY
Qty: 21 TABLET | Refills: 0 | Status: SHIPPED | OUTPATIENT
Start: 2023-11-19 | End: 2023-12-10

## 2023-11-18 RX ORDER — CLOPIDOGREL BISULFATE 75 MG/1
300 TABLET ORAL ONCE
Status: DISCONTINUED | OUTPATIENT
Start: 2023-11-18 | End: 2023-11-19 | Stop reason: HOSPADM

## 2023-11-18 RX ADMIN — IOPAMIDOL 75 ML: 755 INJECTION, SOLUTION INTRAVENOUS at 20:39

## 2023-11-18 RX ADMIN — SODIUM CHLORIDE 1000 ML: 9 INJECTION, SOLUTION INTRAVENOUS at 20:12

## 2023-11-18 RX ADMIN — DIPHENHYDRAMINE HYDROCHLORIDE 12.5 MG: 50 INJECTION INTRAMUSCULAR; INTRAVENOUS at 20:12

## 2023-11-18 RX ADMIN — PROCHLORPERAZINE EDISYLATE 10 MG: 5 INJECTION INTRAMUSCULAR; INTRAVENOUS at 20:11

## 2023-11-18 RX ADMIN — ACETAMINOPHEN 1000 MG: 500 TABLET ORAL at 20:12

## 2023-11-18 ASSESSMENT — PAIN SCALES - GENERAL
PAINLEVEL_OUTOF10: 4
PAINLEVEL_OUTOF10: 5

## 2023-11-18 ASSESSMENT — PAIN DESCRIPTION - ONSET: ONSET: SUDDEN

## 2023-11-18 ASSESSMENT — PAIN - FUNCTIONAL ASSESSMENT
PAIN_FUNCTIONAL_ASSESSMENT: NONE - DENIES PAIN
PAIN_FUNCTIONAL_ASSESSMENT: PREVENTS OR INTERFERES SOME ACTIVE ACTIVITIES AND ADLS
PAIN_FUNCTIONAL_ASSESSMENT: ACTIVITIES ARE NOT PREVENTED
PAIN_FUNCTIONAL_ASSESSMENT: 0-10

## 2023-11-18 ASSESSMENT — PAIN DESCRIPTION - FREQUENCY: FREQUENCY: CONTINUOUS

## 2023-11-18 ASSESSMENT — PAIN DESCRIPTION - LOCATION
LOCATION: HEAD
LOCATION: HEAD

## 2023-11-18 ASSESSMENT — PAIN DESCRIPTION - PAIN TYPE: TYPE: ACUTE PAIN

## 2023-11-18 ASSESSMENT — PAIN DESCRIPTION - DESCRIPTORS
DESCRIPTORS: OTHER (COMMENT)
DESCRIPTORS: ACHING

## 2023-11-18 ASSESSMENT — PAIN DESCRIPTION - ORIENTATION: ORIENTATION: MID

## 2023-11-20 LAB
EKG ATRIAL RATE: 95 BPM
EKG P AXIS: 48 DEGREES
EKG P-R INTERVAL: 154 MS
EKG Q-T INTERVAL: 360 MS
EKG QRS DURATION: 86 MS
EKG QTC CALCULATION (BAZETT): 452 MS
EKG R AXIS: -47 DEGREES
EKG T AXIS: 70 DEGREES
EKG VENTRICULAR RATE: 95 BPM

## 2023-12-07 ENCOUNTER — HOSPITAL ENCOUNTER (OUTPATIENT)
Dept: MRI IMAGING | Age: 78
Discharge: HOME | End: 2023-12-09
Payer: MEDICARE

## 2023-12-07 DIAGNOSIS — R41.82 ALTERED MENTAL STATUS, UNSPECIFIED ALTERED MENTAL STATUS TYPE: ICD-10-CM

## 2023-12-07 DIAGNOSIS — G45.9 TRANSIENT CEREBRAL ISCHEMIA, UNSPECIFIED TYPE: ICD-10-CM

## 2023-12-07 DIAGNOSIS — R47.01 APHASIA: ICD-10-CM

## 2023-12-07 PROCEDURE — 70551 MRI BRAIN STEM W/O DYE: CPT

## 2023-12-07 PROCEDURE — 70547 MR ANGIOGRAPHY NECK W/O DYE: CPT

## 2023-12-07 PROCEDURE — 70544 MR ANGIOGRAPHY HEAD W/O DYE: CPT

## 2023-12-07 PROCEDURE — 70540 MRI ORBIT/FACE/NECK W/O DYE: CPT

## 2024-01-24 ENCOUNTER — HOSPITAL ENCOUNTER (OUTPATIENT)
Dept: CARDIOLOGY | Age: 79
Discharge: HOME | End: 2024-01-26
Payer: MEDICARE

## 2024-01-24 VITALS
SYSTOLIC BLOOD PRESSURE: 160 MMHG | BODY MASS INDEX: 28.25 KG/M2 | WEIGHT: 180 LBS | HEIGHT: 67 IN | DIASTOLIC BLOOD PRESSURE: 84 MMHG

## 2024-01-24 DIAGNOSIS — G45.9 TIA (TRANSIENT ISCHEMIC ATTACK): ICD-10-CM

## 2024-01-24 PROCEDURE — 2580000003 HC RX 258: Performed by: FAMILY MEDICINE

## 2024-01-24 PROCEDURE — 93306 TTE W/DOPPLER COMPLETE: CPT

## 2024-01-24 RX ORDER — SODIUM CHLORIDE 0.9 % (FLUSH) 0.9 %
10 SYRINGE (ML) INJECTION PRN
Status: DISCONTINUED | OUTPATIENT
Start: 2024-01-24 | End: 2024-01-27 | Stop reason: HOSPADM

## 2024-01-24 RX ADMIN — SODIUM CHLORIDE, PRESERVATIVE FREE 10 ML: 5 INJECTION INTRAVENOUS at 14:20

## 2024-01-24 RX ADMIN — SODIUM CHLORIDE, PRESERVATIVE FREE 10 ML: 5 INJECTION INTRAVENOUS at 14:18

## 2024-01-24 RX ADMIN — SODIUM CHLORIDE, PRESERVATIVE FREE 10 ML: 5 INJECTION INTRAVENOUS at 14:16

## 2024-01-26 LAB
ECHO AO ASC DIAM: 2.6 CM
ECHO AO ASCENDING AORTA INDEX: 1.35 CM/M2
ECHO AV AREA PEAK VELOCITY: 2.3 CM2
ECHO AV AREA VTI: 2.4 CM2
ECHO AV AREA/BSA PEAK VELOCITY: 1.2 CM2/M2
ECHO AV AREA/BSA VTI: 1.2 CM2/M2
ECHO AV CUSP MM: 1.9 CM
ECHO AV MEAN GRADIENT: 2 MMHG
ECHO AV MEAN VELOCITY: 0.6 M/S
ECHO AV PEAK GRADIENT: 4 MMHG
ECHO AV PEAK VELOCITY: 1 M/S
ECHO AV VELOCITY RATIO: 0.7
ECHO AV VTI: 19.5 CM
ECHO BSA: 1.96 M2
ECHO EST RA PRESSURE: 3 MMHG
ECHO LA DIAMETER INDEX: 1.81 CM/M2
ECHO LA DIAMETER: 3.5 CM
ECHO LA VOL A-L A2C: 44 ML (ref 22–52)
ECHO LA VOL A-L A4C: 36 ML (ref 22–52)
ECHO LA VOL MOD A2C: 42 ML (ref 22–52)
ECHO LA VOL MOD A4C: 32 ML (ref 22–52)
ECHO LA VOLUME AREA LENGTH: 42 ML
ECHO LA VOLUME INDEX A-L A2C: 23 ML/M2 (ref 16–34)
ECHO LA VOLUME INDEX A-L A4C: 19 ML/M2 (ref 16–34)
ECHO LA VOLUME INDEX AREA LENGTH: 22 ML/M2 (ref 16–34)
ECHO LA VOLUME INDEX MOD A2C: 22 ML/M2 (ref 16–34)
ECHO LA VOLUME INDEX MOD A4C: 17 ML/M2 (ref 16–34)
ECHO LV FRACTIONAL SHORTENING: 30 % (ref 28–44)
ECHO LV INTERNAL DIMENSION DIASTOLE INDEX: 2.28 CM/M2
ECHO LV INTERNAL DIMENSION DIASTOLIC: 4.4 CM (ref 3.9–5.3)
ECHO LV INTERNAL DIMENSION SYSTOLIC INDEX: 1.61 CM/M2
ECHO LV INTERNAL DIMENSION SYSTOLIC: 3.1 CM
ECHO LV ISOVOLUMETRIC RELAXATION TIME (IVRT): 76.1 MS
ECHO LV IVSD: 0.9 CM (ref 0.6–0.9)
ECHO LV IVSS: 1.4 CM
ECHO LV MASS 2D: 128 G (ref 67–162)
ECHO LV MASS INDEX 2D: 66.3 G/M2 (ref 43–95)
ECHO LV POSTERIOR WALL DIASTOLIC: 0.9 CM (ref 0.6–0.9)
ECHO LV POSTERIOR WALL SYSTOLIC: 1.4 CM
ECHO LV RELATIVE WALL THICKNESS RATIO: 0.41
ECHO LVOT AREA: 3.1 CM2
ECHO LVOT AV VTI INDEX: 0.74
ECHO LVOT DIAM: 2 CM
ECHO LVOT MEAN GRADIENT: 1 MMHG
ECHO LVOT PEAK GRADIENT: 2 MMHG
ECHO LVOT PEAK VELOCITY: 0.7 M/S
ECHO LVOT STROKE VOLUME INDEX: 23.6 ML/M2
ECHO LVOT SV: 45.5 ML
ECHO LVOT VTI: 14.5 CM
ECHO MV "A" WAVE DURATION: 66.9 MSEC
ECHO MV A VELOCITY: 0.85 M/S
ECHO MV AREA PHT: 2.7 CM2
ECHO MV AREA VTI: 1.6 CM2
ECHO MV E DECELERATION TIME (DT): 183.3 MS
ECHO MV E VELOCITY: 0.76 M/S
ECHO MV E/A RATIO: 0.89
ECHO MV LVOT VTI INDEX: 1.93
ECHO MV MAX VELOCITY: 1 M/S
ECHO MV MEAN GRADIENT: 1 MMHG
ECHO MV MEAN VELOCITY: 0.6 M/S
ECHO MV PEAK GRADIENT: 4 MMHG
ECHO MV PRESSURE HALF TIME (PHT): 80.4 MS
ECHO MV VTI: 28 CM
ECHO PV MAX VELOCITY: 0.8 M/S
ECHO PV MEAN GRADIENT: 1 MMHG
ECHO PV MEAN VELOCITY: 0.5 M/S
ECHO PV PEAK GRADIENT: 3 MMHG
ECHO PV VTI: 16.2 CM
ECHO PVEIN A DURATION: 100.4 MS
ECHO PVEIN A VELOCITY: 0.4 M/S
ECHO PVEIN PEAK D VELOCITY: 0.6 M/S
ECHO PVEIN PEAK S VELOCITY: 0.7 M/S
ECHO PVEIN S/D RATIO: 1.2
ECHO RIGHT VENTRICULAR SYSTOLIC PRESSURE (RVSP): 24 MMHG
ECHO TV REGURGITANT MAX VELOCITY: 2.31 M/S
ECHO TV REGURGITANT PEAK GRADIENT: 21 MMHG

## 2024-10-21 ENCOUNTER — HOSPITAL ENCOUNTER (OUTPATIENT)
Dept: MAMMOGRAPHY | Age: 79
Discharge: HOME | End: 2024-10-23
Payer: MEDICARE

## 2024-10-21 VITALS — BODY MASS INDEX: 28.25 KG/M2 | HEIGHT: 67 IN | WEIGHT: 180 LBS

## 2024-10-21 DIAGNOSIS — Z12.31 ENCOUNTER FOR SCREENING MAMMOGRAM FOR MALIGNANT NEOPLASM OF BREAST: ICD-10-CM

## 2024-10-21 PROCEDURE — 77063 BREAST TOMOSYNTHESIS BI: CPT

## (undated) DEVICE — WORKING LENGTH 155CM, WORKING CHANNEL 2.8MM: Brand: RESOLUTION 360 CLIP

## (undated) DEVICE — Device

## (undated) DEVICE — BLOCK BITE 60FR RUBBER ADLT DENTAL

## (undated) DEVICE — MIC* GASTRIC-JEJUNAL FEEDING TUBE KIT - SURGICAL PLACEMENT - 18 FR: Brand: MIC* GASTRIC - JEJUNAL FEEDING TUBE KIT

## (undated) DEVICE — TOWEL,OR,DSP,ST,BLUE,STD,6/PK,12PK/CS: Brand: MEDLINE

## (undated) DEVICE — SPONGE GZ W4XL4IN RAYON POLY FILL CVR W/ NONWOVEN FAB

## (undated) DEVICE — GRADUATE TRIANG MEASURE 1000ML BLK PRNT

## (undated) DEVICE — GLOVE ORTHO 8   MSG9480

## (undated) DEVICE — SPONGE,DRAIN,NONWVN,4"X4",6PLY,STRL,LF: Brand: MEDLINE

## (undated) DEVICE — 3M™ MICROPORE™ TAPE, 1530-2: Brand: 3M™ MICROPORE™